# Patient Record
Sex: FEMALE | Race: WHITE | NOT HISPANIC OR LATINO | Employment: PART TIME | URBAN - METROPOLITAN AREA
[De-identification: names, ages, dates, MRNs, and addresses within clinical notes are randomized per-mention and may not be internally consistent; named-entity substitution may affect disease eponyms.]

---

## 2018-08-28 ENCOUNTER — TRANSCRIBE ORDERS (OUTPATIENT)
Dept: ADMINISTRATIVE | Facility: HOSPITAL | Age: 68
End: 2018-08-28

## 2018-08-28 ENCOUNTER — HOSPITAL ENCOUNTER (OUTPATIENT)
Dept: CARDIOLOGY | Facility: HOSPITAL | Age: 68
Discharge: HOME | End: 2018-08-28
Attending: SPECIALIST | Admitting: SPECIALIST

## 2018-08-28 ENCOUNTER — LAB (OUTPATIENT)
Dept: LAB | Facility: HOSPITAL | Age: 68
End: 2018-08-28
Attending: SPECIALIST

## 2018-08-28 DIAGNOSIS — I10 ESSENTIAL HYPERTENSION, MALIGNANT: Primary | ICD-10-CM

## 2018-08-28 DIAGNOSIS — I10 ESSENTIAL HYPERTENSION, MALIGNANT: ICD-10-CM

## 2018-08-28 LAB
ANION GAP SERPL CALCULATED.3IONS-SCNC: 10.4 MMOL/L
BUN BLD-MCNC: 21 MG/DL (ref 8–23)
BUN/CREAT SERPL: 23.3 (ref 7–25)
CALCIUM SPEC-SCNC: 8.8 MG/DL (ref 8.6–10.5)
CHLORIDE SERPL-SCNC: 104 MMOL/L (ref 98–107)
CO2 SERPL-SCNC: 25.6 MMOL/L (ref 22–29)
CREAT BLD-MCNC: 0.9 MG/DL (ref 0.57–1)
DEPRECATED RDW RBC AUTO: 48 FL (ref 37–54)
ERYTHROCYTE [DISTWIDTH] IN BLOOD BY AUTOMATED COUNT: 13.8 % (ref 11.7–13)
GFR SERPL CREATININE-BSD FRML MDRD: 62 ML/MIN/1.73
GLUCOSE BLD-MCNC: 90 MG/DL (ref 65–99)
HCT VFR BLD AUTO: 46.2 % (ref 35.6–45.5)
HGB BLD-MCNC: 14.7 G/DL (ref 11.9–15.5)
MCH RBC QN AUTO: 30.2 PG (ref 26.9–32)
MCHC RBC AUTO-ENTMCNC: 31.8 G/DL (ref 32.4–36.3)
MCV RBC AUTO: 94.9 FL (ref 80.5–98.2)
PLATELET # BLD AUTO: 225 10*3/MM3 (ref 140–500)
PMV BLD AUTO: 9.7 FL (ref 6–12)
POTASSIUM BLD-SCNC: 4.2 MMOL/L (ref 3.5–5.2)
RBC # BLD AUTO: 4.87 10*6/MM3 (ref 3.9–5.2)
SODIUM BLD-SCNC: 140 MMOL/L (ref 136–145)
WBC NRBC COR # BLD: 7.52 10*3/MM3 (ref 4.5–10.7)

## 2018-08-28 PROCEDURE — 85027 COMPLETE CBC AUTOMATED: CPT

## 2018-08-28 PROCEDURE — 93010 ELECTROCARDIOGRAM REPORT: CPT | Performed by: INTERNAL MEDICINE

## 2018-08-28 PROCEDURE — 36415 COLL VENOUS BLD VENIPUNCTURE: CPT

## 2018-08-28 PROCEDURE — 93005 ELECTROCARDIOGRAM TRACING: CPT | Performed by: SPECIALIST

## 2018-08-28 PROCEDURE — 80048 BASIC METABOLIC PNL TOTAL CA: CPT

## 2019-02-11 ENCOUNTER — OFFICE VISIT (OUTPATIENT)
Dept: INTERNAL MEDICINE | Facility: CLINIC | Age: 69
End: 2019-02-11

## 2019-02-11 VITALS
WEIGHT: 189 LBS | OXYGEN SATURATION: 96 % | HEART RATE: 74 BPM | HEIGHT: 65 IN | SYSTOLIC BLOOD PRESSURE: 140 MMHG | DIASTOLIC BLOOD PRESSURE: 82 MMHG | BODY MASS INDEX: 31.49 KG/M2

## 2019-02-11 DIAGNOSIS — R05.9 COUGH: ICD-10-CM

## 2019-02-11 DIAGNOSIS — M19.90 OSTEOARTHRITIS, UNSPECIFIED OSTEOARTHRITIS TYPE, UNSPECIFIED SITE: ICD-10-CM

## 2019-02-11 DIAGNOSIS — J20.9 ACUTE BRONCHITIS, UNSPECIFIED ORGANISM: ICD-10-CM

## 2019-02-11 DIAGNOSIS — Z00.00 WELL ADULT EXAM: ICD-10-CM

## 2019-02-11 DIAGNOSIS — Z12.31 ENCOUNTER FOR SCREENING MAMMOGRAM FOR BREAST CANCER: ICD-10-CM

## 2019-02-11 DIAGNOSIS — R03.0 ELEVATED BLOOD-PRESSURE READING WITHOUT DIAGNOSIS OF HYPERTENSION: ICD-10-CM

## 2019-02-11 DIAGNOSIS — Z00.00 LABORATORY TESTS ORDERED AS PART OF A COMPLETE PHYSICAL EXAM (CPE): ICD-10-CM

## 2019-02-11 DIAGNOSIS — Z12.11 COLON CANCER SCREENING: ICD-10-CM

## 2019-02-11 DIAGNOSIS — Z00.00 MEDICARE ANNUAL WELLNESS VISIT, INITIAL: Primary | ICD-10-CM

## 2019-02-11 DIAGNOSIS — R94.6 ABNORMAL THYROID FUNCTION TEST: ICD-10-CM

## 2019-02-11 PROBLEM — M16.0 PRIMARY OSTEOARTHRITIS OF BOTH HIPS: Status: ACTIVE | Noted: 2019-02-11

## 2019-02-11 PROCEDURE — 99397 PER PM REEVAL EST PAT 65+ YR: CPT | Performed by: INTERNAL MEDICINE

## 2019-02-11 PROCEDURE — G0439 PPPS, SUBSEQ VISIT: HCPCS | Performed by: INTERNAL MEDICINE

## 2019-02-11 PROCEDURE — G0009 ADMIN PNEUMOCOCCAL VACCINE: HCPCS | Performed by: INTERNAL MEDICINE

## 2019-02-11 PROCEDURE — 90670 PCV13 VACCINE IM: CPT | Performed by: INTERNAL MEDICINE

## 2019-02-11 PROCEDURE — 96160 PT-FOCUSED HLTH RISK ASSMT: CPT | Performed by: INTERNAL MEDICINE

## 2019-02-11 RX ORDER — AZITHROMYCIN 250 MG/1
TABLET, FILM COATED ORAL
Qty: 6 TABLET | Refills: 0 | Status: ON HOLD | OUTPATIENT
Start: 2019-02-11 | End: 2019-05-24

## 2019-02-11 NOTE — PROGRESS NOTES
Subjective     Gina Gonzalez is a 68 y.o. female who presents for an annual wellness visit/cpe.      History of Present Illness     C/o three weeks of head and nasal congestion.  Cough which is productive.  No fever.  No sinus pain.  Mild sore throat.      H/o abnormal TSH in the past.     H/o OA.      She checks BP at home.  It is always excellent.     Review of Systems   Constitutional: Negative.    HENT: Positive for congestion.    Eyes: Negative.    Respiratory: Positive for cough.    Cardiovascular: Negative.    Gastrointestinal: Negative.    Endocrine: Negative.    Genitourinary: Negative.    Musculoskeletal: Positive for arthralgias.   Skin: Negative.    Allergic/Immunologic: Negative.    Neurological: Negative.    Hematological: Negative.    Psychiatric/Behavioral: Negative.        The following portions of the patient's history were reviewed and updated as appropriate: allergies, current medications, past family history, past medical history, past social history, past surgical history and problem list.  Health maintenance tab was reviewed and updated with the patient.       Patient Active Problem List    Diagnosis Date Noted   • Primary osteoarthritis of both hips 02/11/2019       No past medical history on file.    No past surgical history on file.    Family History   Problem Relation Age of Onset   • Aneurysm Mother    • Lung cancer Father    • Coronary artery disease Brother        Social History     Socioeconomic History   • Marital status:      Spouse name: Not on file   • Number of children: Not on file   • Years of education: Not on file   • Highest education level: Not on file   Social Needs   • Financial resource strain: Not on file   • Food insecurity - worry: Not on file   • Food insecurity - inability: Not on file   • Transportation needs - medical: Not on file   • Transportation needs - non-medical: Not on file   Occupational History   • Not on file   Tobacco Use   • Smoking status: Not on  "file   Substance and Sexual Activity   • Alcohol use: Not on file   • Drug use: Not on file   • Sexual activity: Not on file   Other Topics Concern   • Not on file   Social History Narrative   • Not on file       No current outpatient medications on file prior to visit.     No current facility-administered medications on file prior to visit.        No Known Allergies    Immunization History   Administered Date(s) Administered   • Pneumococcal Conjugate 13-Valent (PCV13) 02/11/2019       Objective     /82   Pulse 74   Ht 165.1 cm (65\")   Wt 85.7 kg (189 lb)   SpO2 96%   BMI 31.45 kg/m²     Physical Exam   Constitutional: She is oriented to person, place, and time. She appears well-developed and well-nourished.   HENT:   Head: Normocephalic and atraumatic.   Right Ear: Hearing, tympanic membrane and external ear normal.   Left Ear: Hearing, tympanic membrane and external ear normal.   Nose: Nose normal.   Mouth/Throat: Oropharynx is clear and moist.   Neck: Neck supple. No thyromegaly present.   Cardiovascular: Normal rate, regular rhythm and normal heart sounds.   No murmur heard.  Pulmonary/Chest: Effort normal and breath sounds normal. Right breast exhibits no mass. Left breast exhibits no mass.   Abdominal: Soft. She exhibits no distension. There is no hepatosplenomegaly. There is no tenderness.   Genitourinary: No breast tenderness.   Lymphadenopathy:     She has no cervical adenopathy.   Neurological: She is alert and oriented to person, place, and time.   Skin: Skin is warm and dry.   Psychiatric: She has a normal mood and affect. Her speech is normal and behavior is normal. Judgment and thought content normal. Cognition and memory are normal.       Assessment/Plan   Gina was seen today for medicare wellness-subsequent.    Diagnoses and all orders for this visit:    Medicare annual wellness visit, initial    Well adult exam    Colon cancer screening  -     Ambulatory Referral For Screening " Colonoscopy    Encounter for screening mammogram for breast cancer  -     Mammo Screening Digital Tomosynthesis Bilateral With CAD; Future    Cough  -     Comprehensive Metabolic Panel  -     CBC & Differential  -     Lipid Panel  -     TSH  -     Urinalysis With Microscopic - Urine, Clean Catch    Acute bronchitis, unspecified organism  -     Comprehensive Metabolic Panel  -     CBC & Differential  -     Lipid Panel  -     TSH  -     Urinalysis With Microscopic - Urine, Clean Catch    Elevated blood-pressure reading without diagnosis of hypertension  -     Comprehensive Metabolic Panel  -     CBC & Differential  -     Lipid Panel  -     TSH  -     Urinalysis With Microscopic - Urine, Clean Catch    Laboratory tests ordered as part of a complete physical exam (CPE)  -     Comprehensive Metabolic Panel  -     CBC & Differential  -     Lipid Panel  -     TSH  -     Urinalysis With Microscopic - Urine, Clean Catch    Osteoarthritis, unspecified osteoarthritis type, unspecified site  -     Comprehensive Metabolic Panel  -     CBC & Differential  -     Lipid Panel  -     TSH  -     Urinalysis With Microscopic - Urine, Clean Catch    Abnormal thyroid function test  -     Comprehensive Metabolic Panel  -     CBC & Differential  -     Lipid Panel  -     TSH  -     Urinalysis With Microscopic - Urine, Clean Catch    Other orders  -     azithromycin (ZITHROMAX Z-KATHRYN) 250 MG tablet; Take 2 tablets the first day, then 1 tablet daily for 4 days.  -     Pneumococcal Conjugate Vaccine 13-Valent All        Discussion    AWV/CPE.  See scanned forms and/or computer for julia history, PHQ-9, functional ability questionnaire, cognitive impairment screening.  Direct observation of cognitive abilities:  The patient does not exhibit any impairment in cognitive abilities upon direct observation at today's visit.   These were all reviewed with the patient and the patient was provided with a personal prevention plan of service in patient  instructions.  Patient was given advice or information on the following topics:  nutrition, exercise  Patient presents with episodes of acute bronchitis.  A prescription for antibiotics is provided today.  The patient is instructed to take along with Mucinex DM.  Let me know they are not feeling better over the next 3 days or if there is any change in symptoms.    I have recommended that the patient get the following immunizations:  Shingrix, tdap, flu and hepatitis A.        Health Maintenance   Topic Date Due   • TDAP/TD VACCINES (1 - Tdap) 02/19/1970   • ZOSTER VACCINE (1 of 2) 02/19/2001   • MEDICARE ANNUAL WELLNESS  02/11/2019   • MAMMOGRAM  02/11/2019   • COLONOSCOPY  02/11/2019   • PNEUMOCOCCAL VACCINES (65+ LOW/MEDIUM RISK) (2 of 2 - PPSV23) 02/11/2020   • HEPATITIS C SCREENING  Addressed   • INFLUENZA VACCINE  Addressed            Future Appointments   Date Time Provider Department Center   2/20/2019 10:00 AM [facility] None   2/14/2020 10:00 AM [facility] None   2/19/2020 11:15 AM Lisa Malone MD [facility] None

## 2019-02-12 PROBLEM — R79.89 ABNORMAL TSH: Status: ACTIVE | Noted: 2019-02-12

## 2019-02-12 NOTE — PATIENT INSTRUCTIONS
Medicare Wellness  Personal Prevention Plan of Service     Date of Office Visit:  2019  Encounter Provider:  Lisa Malone MD  Place of Service:  [facility]  Patient Name: Gina HIGUERA:  1950    As part of the Medicare Wellness portion of your visit today, we are providing you with this personalized preventive plan of services (PPPS). This plan is based upon recommendations of the [place] Preventive Services Task Force (USPSTF) and the Advisory Committee on Immunization Practices (ACIP).    This lists the preventive care services that should be considered, and provides dates of when you are due. Items listed as completed are up-to-date and do not require any further intervention.    Health Maintenance   Topic Date Due   • TDAP/TD VACCINES (1 - Tdap) 1970   • ZOSTER VACCINE (1 of 2) 2001   • MEDICARE ANNUAL WELLNESS  2019   • MAMMOGRAM  2019   • COLONOSCOPY  2019   • PNEUMOCOCCAL VACCINES (65+ LOW/MEDIUM RISK) (2 of 2 - PPSV23) 2020   • HEPATITIS C SCREENING  Addressed   • INFLUENZA VACCINE  Addressed       Orders Placed This Encounter   Procedures   • Mammo Screening Digital Tomosynthesis Bilateral With CAD     Standing Status:   Future     Standing Expiration Date:   2020     Order Specific Question:   Reason for Exam:     Answer:   screen   • Pneumococcal Conjugate Vaccine 13-Valent All   • Comprehensive Metabolic Panel   • Lipid Panel   • TSH   • Ambulatory Referral For Screening Colonoscopy     Referral Priority:   Routine     Referral Type:   Diagnostic Medical     Referral Reason:   Specialty Services Required     Referred to Provider:   Ann So MD     Requested Specialty:   Gastroenterology     Number of Visits Requested:   1   • CBC & Differential     Order Specific Question:   Manual Differential     Answer:   No   • Urinalysis With Microscopic - Urine, Clean Catch       Return in about 1 year  (around 2/11/2020) for Medicare Wellness.

## 2019-02-14 ENCOUNTER — TELEPHONE (OUTPATIENT)
Dept: INTERNAL MEDICINE | Facility: CLINIC | Age: 69
End: 2019-02-14

## 2019-02-14 NOTE — TELEPHONE ENCOUNTER
She contacted medical records and there is no evidence that she has had a Tdap. CLC    Advise patient, she should get a tdap at the pharmacy.  FARAW

## 2019-02-21 LAB
ALBUMIN SERPL-MCNC: 4.4 G/DL (ref 3.6–4.8)
ALBUMIN/GLOB SERPL: 1.8 {RATIO} (ref 1.2–2.2)
ALP SERPL-CCNC: 71 IU/L (ref 39–117)
ALT SERPL-CCNC: 17 IU/L (ref 0–32)
APPEARANCE UR: CLEAR
AST SERPL-CCNC: 19 IU/L (ref 0–40)
BACTERIA #/AREA URNS HPF: NORMAL /[HPF]
BASOPHILS # BLD AUTO: 0 X10E3/UL (ref 0–0.2)
BASOPHILS NFR BLD AUTO: 1 %
BILIRUB SERPL-MCNC: 0.5 MG/DL (ref 0–1.2)
BILIRUB UR QL STRIP: NEGATIVE
BUN SERPL-MCNC: 24 MG/DL (ref 8–27)
BUN/CREAT SERPL: 29 (ref 12–28)
CALCIUM SERPL-MCNC: 9.6 MG/DL (ref 8.7–10.3)
CHLORIDE SERPL-SCNC: 103 MMOL/L (ref 96–106)
CHOLEST SERPL-MCNC: 237 MG/DL (ref 100–199)
CO2 SERPL-SCNC: 24 MMOL/L (ref 20–29)
COLOR UR: YELLOW
CREAT SERPL-MCNC: 0.84 MG/DL (ref 0.57–1)
EOSINOPHIL # BLD AUTO: 0.2 X10E3/UL (ref 0–0.4)
EOSINOPHIL NFR BLD AUTO: 4 %
EPI CELLS #/AREA URNS HPF: NORMAL /HPF
ERYTHROCYTE [DISTWIDTH] IN BLOOD BY AUTOMATED COUNT: 13.6 % (ref 12.3–15.4)
GLOBULIN SER CALC-MCNC: 2.5 G/DL (ref 1.5–4.5)
GLUCOSE SERPL-MCNC: 86 MG/DL (ref 65–99)
GLUCOSE UR QL: NEGATIVE
HCT VFR BLD AUTO: 44 % (ref 34–46.6)
HDLC SERPL-MCNC: 88 MG/DL
HGB BLD-MCNC: 14.4 G/DL (ref 11.1–15.9)
HGB UR QL STRIP: NEGATIVE
IMM GRANULOCYTES # BLD AUTO: 0 X10E3/UL (ref 0–0.1)
IMM GRANULOCYTES NFR BLD AUTO: 0 %
KETONES UR QL STRIP: NEGATIVE
LDLC SERPL CALC-MCNC: 137 MG/DL (ref 0–99)
LEUKOCYTE ESTERASE UR QL STRIP: (no result)
LYMPHOCYTES # BLD AUTO: 2 X10E3/UL (ref 0.7–3.1)
LYMPHOCYTES NFR BLD AUTO: 38 %
MCH RBC QN AUTO: 30.3 PG (ref 26.6–33)
MCHC RBC AUTO-ENTMCNC: 32.7 G/DL (ref 31.5–35.7)
MCV RBC AUTO: 93 FL (ref 79–97)
MICRO URNS: (no result)
MONOCYTES # BLD AUTO: 0.5 X10E3/UL (ref 0.1–0.9)
MONOCYTES NFR BLD AUTO: 10 %
MUCOUS THREADS URNS QL MICRO: PRESENT
NEUTROPHILS # BLD AUTO: 2.5 X10E3/UL (ref 1.4–7)
NEUTROPHILS NFR BLD AUTO: 47 %
NITRITE UR QL STRIP: NEGATIVE
PH UR STRIP: 7 [PH] (ref 5–7.5)
PLATELET # BLD AUTO: 296 X10E3/UL (ref 150–379)
POTASSIUM SERPL-SCNC: 4.6 MMOL/L (ref 3.5–5.2)
PROT SERPL-MCNC: 6.9 G/DL (ref 6–8.5)
PROT UR QL STRIP: NEGATIVE
RBC # BLD AUTO: 4.75 X10E6/UL (ref 3.77–5.28)
RBC #/AREA URNS HPF: NORMAL /HPF
SODIUM SERPL-SCNC: 141 MMOL/L (ref 134–144)
SP GR UR: 1.02 (ref 1–1.03)
TRIGL SERPL-MCNC: 59 MG/DL (ref 0–149)
TSH SERPL DL<=0.005 MIU/L-ACNC: 3.54 UIU/ML (ref 0.45–4.5)
UROBILINOGEN UR STRIP-MCNC: 0.2 MG/DL (ref 0.2–1)
VLDLC SERPL CALC-MCNC: 12 MG/DL (ref 5–40)
WBC # BLD AUTO: 5.3 X10E3/UL (ref 3.4–10.8)
WBC #/AREA URNS HPF: NORMAL /HPF

## 2019-02-22 PROBLEM — E78.49 OTHER HYPERLIPIDEMIA: Status: ACTIVE | Noted: 2019-02-22

## 2019-03-14 ENCOUNTER — HOSPITAL ENCOUNTER (OUTPATIENT)
Dept: MAMMOGRAPHY | Facility: HOSPITAL | Age: 69
Discharge: HOME | End: 2019-03-14
Admitting: INTERNAL MEDICINE

## 2019-03-14 DIAGNOSIS — Z12.31 ENCOUNTER FOR SCREENING MAMMOGRAM FOR BREAST CANCER: ICD-10-CM

## 2019-03-14 PROCEDURE — 77067 SCR MAMMO BI INCL CAD: CPT

## 2019-03-14 PROCEDURE — 77063 BREAST TOMOSYNTHESIS BI: CPT

## 2019-03-15 DIAGNOSIS — Z12.11 COLON CANCER SCREENING: Primary | ICD-10-CM

## 2019-03-15 RX ORDER — SODIUM CHLORIDE, SODIUM LACTATE, POTASSIUM CHLORIDE, CALCIUM CHLORIDE 600; 310; 30; 20 MG/100ML; MG/100ML; MG/100ML; MG/100ML
30 INJECTION, SOLUTION INTRAVENOUS CONTINUOUS
Status: CANCELLED | OUTPATIENT
Start: 2019-05-24

## 2019-03-20 PROBLEM — Z12.11 COLON CANCER SCREENING: Status: ACTIVE | Noted: 2019-03-20

## 2019-05-24 ENCOUNTER — ANESTHESIA (OUTPATIENT)
Dept: GASTROENTEROLOGY | Facility: HOSPITAL | Age: 69
End: 2019-05-24

## 2019-05-24 ENCOUNTER — HOSPITAL ENCOUNTER (OUTPATIENT)
Facility: HOSPITAL | Age: 69
Setting detail: HOSPITAL OUTPATIENT SURGERY
Discharge: HOME | End: 2019-05-24
Attending: INTERNAL MEDICINE | Admitting: INTERNAL MEDICINE

## 2019-05-24 ENCOUNTER — TELEPHONE (OUTPATIENT)
Dept: GASTROENTEROLOGY | Facility: CLINIC | Age: 69
End: 2019-05-24

## 2019-05-24 ENCOUNTER — ANESTHESIA EVENT (OUTPATIENT)
Dept: GASTROENTEROLOGY | Facility: HOSPITAL | Age: 69
End: 2019-05-24

## 2019-05-24 VITALS
OXYGEN SATURATION: 96 % | HEART RATE: 58 BPM | SYSTOLIC BLOOD PRESSURE: 133 MMHG | BODY MASS INDEX: 30.37 KG/M2 | WEIGHT: 189 LBS | RESPIRATION RATE: 16 BRPM | TEMPERATURE: 97.6 F | DIASTOLIC BLOOD PRESSURE: 93 MMHG | HEIGHT: 66 IN

## 2019-05-24 DIAGNOSIS — Z12.11 COLON CANCER SCREENING: ICD-10-CM

## 2019-05-24 PROCEDURE — S0260 H&P FOR SURGERY: HCPCS | Performed by: INTERNAL MEDICINE

## 2019-05-24 PROCEDURE — G0121 COLON CA SCRN NOT HI RSK IND: HCPCS | Performed by: INTERNAL MEDICINE

## 2019-05-24 PROCEDURE — 25010000002 PROPOFOL 10 MG/ML EMULSION: Performed by: NURSE ANESTHETIST, CERTIFIED REGISTERED

## 2019-05-24 RX ORDER — PROPOFOL 10 MG/ML
VIAL (ML) INTRAVENOUS AS NEEDED
Status: DISCONTINUED | OUTPATIENT
Start: 2019-05-24 | End: 2019-05-24 | Stop reason: SURG

## 2019-05-24 RX ORDER — PROMETHAZINE HYDROCHLORIDE 25 MG/1
25 TABLET ORAL ONCE AS NEEDED
Status: DISCONTINUED | OUTPATIENT
Start: 2019-05-24 | End: 2019-05-24 | Stop reason: HOSPADM

## 2019-05-24 RX ORDER — SODIUM CHLORIDE, SODIUM LACTATE, POTASSIUM CHLORIDE, CALCIUM CHLORIDE 600; 310; 30; 20 MG/100ML; MG/100ML; MG/100ML; MG/100ML
30 INJECTION, SOLUTION INTRAVENOUS CONTINUOUS
Status: DISCONTINUED | OUTPATIENT
Start: 2019-05-24 | End: 2019-05-24 | Stop reason: HOSPADM

## 2019-05-24 RX ORDER — PROMETHAZINE HYDROCHLORIDE 25 MG/ML
12.5 INJECTION, SOLUTION INTRAMUSCULAR; INTRAVENOUS ONCE AS NEEDED
Status: DISCONTINUED | OUTPATIENT
Start: 2019-05-24 | End: 2019-05-24 | Stop reason: HOSPADM

## 2019-05-24 RX ORDER — PROPOFOL 10 MG/ML
VIAL (ML) INTRAVENOUS CONTINUOUS PRN
Status: DISCONTINUED | OUTPATIENT
Start: 2019-05-24 | End: 2019-05-24 | Stop reason: SURG

## 2019-05-24 RX ORDER — LIDOCAINE HYDROCHLORIDE 20 MG/ML
INJECTION, SOLUTION INFILTRATION; PERINEURAL AS NEEDED
Status: DISCONTINUED | OUTPATIENT
Start: 2019-05-24 | End: 2019-05-24 | Stop reason: SURG

## 2019-05-24 RX ORDER — PROMETHAZINE HYDROCHLORIDE 25 MG/1
25 SUPPOSITORY RECTAL ONCE AS NEEDED
Status: DISCONTINUED | OUTPATIENT
Start: 2019-05-24 | End: 2019-05-24 | Stop reason: HOSPADM

## 2019-05-24 RX ADMIN — LIDOCAINE HYDROCHLORIDE 60 MG: 20 INJECTION, SOLUTION INFILTRATION; PERINEURAL at 08:05

## 2019-05-24 RX ADMIN — PROPOFOL 50 MG: 10 INJECTION, EMULSION INTRAVENOUS at 08:05

## 2019-05-24 RX ADMIN — PROPOFOL 200 MCG/KG/MIN: 10 INJECTION, EMULSION INTRAVENOUS at 08:05

## 2019-05-24 RX ADMIN — SODIUM CHLORIDE, POTASSIUM CHLORIDE, SODIUM LACTATE AND CALCIUM CHLORIDE 30 ML/HR: 600; 310; 30; 20 INJECTION, SOLUTION INTRAVENOUS at 07:37

## 2019-05-24 NOTE — ANESTHESIA PREPROCEDURE EVALUATION
Anesthesia Evaluation     Patient summary reviewed   NPO Solid Status: > 8 hours  NPO Liquid Status: > 8 hours           Airway   Mallampati: II  TM distance: >3 FB  Neck ROM: full  No difficulty expected  Dental - normal exam     Pulmonary     breath sounds clear to auscultation  Cardiovascular   Exercise tolerance: good (4-7 METS)    Rhythm: regular  Rate: normal        Neuro/Psych  GI/Hepatic/Renal/Endo      Musculoskeletal     Abdominal    Substance History      OB/GYN          Other                        Anesthesia Plan    ASA 1     MAC     intravenous induction   Anesthetic plan, all risks, benefits, and alternatives have been provided, discussed and informed consent has been obtained with: patient.

## 2019-05-24 NOTE — ANESTHESIA POSTPROCEDURE EVALUATION
Patient: Gina Gonzalez    Procedure Summary     Date:  05/24/19 Room / Location:   [facility]    Anesthesia Start:  0804 Anesthesia Stop:  0840    Procedure:  COLONOSCOPY TO CECUM AND TI (N/A ) Diagnosis:       Colon cancer screening      (Colon cancer screening [Z12.11])    Surgeon:  Cecilia Aviles MD Provider:  Robbie Eugene MD    Anesthesia Type:  MAC ASA Status:  1          Anesthesia Type: MAC  Last vitals  BP   131/88 (05/24/19 0851)   Temp   36.4 °C (97.6 °F) (05/24/19 0840)   Pulse   67 (05/24/19 0851)   Resp   16 (05/24/19 0851)     SpO2   96 % (05/24/19 0851)     Post Anesthesia Care and Evaluation    Patient location during evaluation: bedside  Patient participation: complete - patient participated  Level of consciousness: awake and alert  Pain score: 0  Pain management: adequate  Airway patency: patent  Anesthetic complications: No anesthetic complications  PONV Status: none  Cardiovascular status: acceptable  Respiratory status: acceptable  Hydration status: acceptable  Post Neuraxial Block status: Motor and sensory function returned to baseline

## 2019-05-24 NOTE — TELEPHONE ENCOUNTER
----- Message from Cecilia Aviles MD sent at 5/24/2019  8:40 AM EDT -----  pls place in 10 year recall, x

## 2019-07-24 ENCOUNTER — TRANSCRIBE ORDERS (OUTPATIENT)
Dept: PHYSICAL THERAPY | Facility: HOSPITAL | Age: 69
End: 2019-07-24

## 2019-07-24 DIAGNOSIS — S46.212A BICEPS TENDON RUPTURE, PROXIMAL, LEFT, INITIAL ENCOUNTER: Primary | ICD-10-CM

## 2019-07-30 ENCOUNTER — APPOINTMENT (OUTPATIENT)
Dept: PHYSICAL THERAPY | Facility: HOSPITAL | Age: 69
End: 2019-07-30

## 2019-08-02 ENCOUNTER — TREATMENT (OUTPATIENT)
Dept: PHYSICAL THERAPY | Facility: CLINIC | Age: 69
End: 2019-08-02

## 2019-08-02 DIAGNOSIS — M25.512 ACUTE PAIN OF LEFT SHOULDER: ICD-10-CM

## 2019-08-02 DIAGNOSIS — S46.212A BICEPS RUPTURE, PROXIMAL, LEFT, INITIAL ENCOUNTER: Primary | ICD-10-CM

## 2019-08-02 PROCEDURE — 97161 PT EVAL LOW COMPLEX 20 MIN: CPT | Performed by: PHYSICAL THERAPIST

## 2019-08-02 PROCEDURE — 97110 THERAPEUTIC EXERCISES: CPT | Performed by: PHYSICAL THERAPIST

## 2019-08-02 NOTE — PROGRESS NOTES
"Physical Therapy Initial Evaluation and Plan of Care    Patient: Gina Gonzalez   : 1950  Diagnosis/ICD-10 Code:  Biceps rupture, proximal, left, initial encounter [S46.212A]  Referring practitioner: Mary Almanzar MD    Subjective Evaluation    History of Present Illness  Date of onset: 2019  Mechanism of injury: Repetitive lifting and pulling     Subjective comment: Pt reports she does repetitve lifting and pulling on her job. 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  Quality of life: excellent    Pain  Current pain ratin  At best pain ratin  At worst pain ratin  Location: L anterior shoulder   Quality: dull ache  Relieving factors: rest  Aggravating factors: lifting and overhead activity  Progression: improved    Social Support  Lives in: multiple-level home  Lives with: spouse    Hand dominance: right    Treatments  Previous treatment: medication  Current treatment: medication and physical therapy  Patient Goals  Patient goals for therapy: increased strength, increased motion and decreased pain             Objective       Observations     Additional Observation Details  L bicep \"deisi\" sign     Palpation     Additional Palpation Details  TTP R anterior shoulder     Active Range of Motion   Left Shoulder   Normal active range of motion    Right Shoulder   Normal active range of motion    Scapular Mobility   Left Shoulder   Scapular mobility: good    Right Shoulder   Scapular mobility: good    Strength/Myotome Testing     Left Shoulder     Planes of Motion   Flexion: 5   Abduction: 4+   External rotation at 0°: 5   External rotation at 90°: 5     Right Shoulder   Normal muscle strength    Additional Strength Details  L biceps 4+/5  L brachialis 5/5  L brachioradialis 5/5    Functional Assessment     Comments  Quick dash score 18.18  Work module: moderate difficulty          Assessment & Plan "     Assessment  Impairments: abnormal muscle firing, abnormal or restricted ROM, activity intolerance, impaired physical strength, lacks appropriate home exercise program and pain with function  Assessment details: Pt presents to PT with symptoms consistent with L proximal bicep tendon rupture.  Pt would benefit from skilled PT intervention to address the deficits noted.   Prognosis: good  Functional Limitations: carrying objects, lifting, sleeping, pushing, uncomfortable because of pain, reaching behind back and reaching overhead  Goals  Plan Goals: SHORT TERM GOALS: 2 visits  1. Patient to be compliant with HEP     LONG TERM GOALS: 4 visits  1. Pt will have improved score on quick dash   2. Pt to exhibit 5/5 UE strength to allow for pushing/pulling and lifting >5 #to occur with pain <2/10 to tolerate work and household activities       Plan  Therapy options: will be seen for skilled physical therapy services  Planned modality interventions: cryotherapy, electrical stimulation/Russian stimulation, TENS, thermotherapy (hydrocollator packs) and ultrasound  Other planned modality interventions: Dry Needling  Planned therapy interventions: flexibility, body mechanics training, home exercise program, functional ROM exercises, joint mobilization, manual therapy, postural training, soft tissue mobilization, spinal/joint mobilization, strengthening, stretching and therapeutic activities  Duration in visits: 4  Treatment plan discussed with: patient        Manual Therapy:          mins  90150;  Therapeutic Exercise:       10   mins  81754;     Neuromuscular Jeremi:         mins  31124;    Therapeutic Activity:           mins  59891;     Gait Training:            mins  88017;     Ultrasound:           mins  89639;    Electrical Stimulation:          mins  23193 ( );  Dry Needling           mins self-pay  Traction           mins 55109  Canalith Repositioning         mins 37638      Timed Treatment:   10   mins   Total  Treatment:   55     mins    PT SIGNATURE: Shell Jose Miguel, YRIS   [place] Lic #[id number]    DATE TREATMENT INITIATED: 8/2/2019    Initial Certification  Certification Period: 10/31/2019  I certify that the therapy services are furnished while this patient is under my care.  The services outlined above are required by this patient, and will be reviewed every 90 days.     PHYSICIAN: Mary Almanzar MD      DATE:     Please sign and return via fax to [phone].. Thank you, [facility].

## 2019-08-02 NOTE — PATIENT INSTRUCTIONS
HEP:    Access Code: [email or web address]   URL: [email or web address]   Date: 08/02/2019   Prepared by: Shell Gillespie     Exercises   Standing Shoulder Flexion with Resistance - 10 reps - 3 sets - 1x daily - 7x weekly   Standing Single Arm Shoulder Abduction with Resistance - 10 reps - 3 sets - 1x daily - 7x weekly   Standing Row with Resistance - 10 reps - 3 sets - 1x daily - 7x weekly   Standing Single Arm Elbow Flexion with Resistance - 10 reps - 3 sets - 1x daily - 7x weekly   Standing Bilateral Elbow Extension with Anchored Resistance - 10 reps - 3 sets - 1x daily - 7x weekly

## 2019-08-07 ENCOUNTER — TREATMENT (OUTPATIENT)
Dept: PHYSICAL THERAPY | Facility: CLINIC | Age: 69
End: 2019-08-07

## 2019-08-07 DIAGNOSIS — M25.512 ACUTE PAIN OF LEFT SHOULDER: ICD-10-CM

## 2019-08-07 DIAGNOSIS — S46.212A BICEPS RUPTURE, PROXIMAL, LEFT, INITIAL ENCOUNTER: Primary | ICD-10-CM

## 2019-08-07 PROCEDURE — 97110 THERAPEUTIC EXERCISES: CPT | Performed by: PHYSICAL THERAPIST

## 2019-08-07 NOTE — PROGRESS NOTES
Physical Therapy Daily Progress Note  Visit: 2    Gina Gonzalez reports: her L arm is feeling better and reports compliance with HEP. States she is tolerating job duties well.     Subjective     Objective   See Exercise, Manual, and Modality Logs for complete treatment.       Assessment/Plan: progressing as evidenced by reports of decreased pain and increase tolerance to daily activities     Manual Therapy:         mins  29720;  Therapeutic Exercise:     54     mins  37826;     Neuromuscular Jeremi:         mins  00042;    Therapeutic Activity:           mins  12427;     Gait Training:            mins  93715;     Ultrasound:           mins  89391;    Electrical Stimulation:          mins  22100 ( );  Dry Needling           mins self-pay  Traction           mins 91028  Canalith Repositioning         mins 55736      Timed Treatment:54      mins   Total Treatment:    60     mins    Shell Gillespie, PT  [place] License #: [id number]    Physical Therapist

## 2019-08-08 ENCOUNTER — APPOINTMENT (OUTPATIENT)
Dept: PHYSICAL THERAPY | Facility: HOSPITAL | Age: 69
End: 2019-08-08

## 2019-08-09 ENCOUNTER — TREATMENT (OUTPATIENT)
Dept: PHYSICAL THERAPY | Facility: CLINIC | Age: 69
End: 2019-08-09

## 2019-08-09 DIAGNOSIS — M25.512 ACUTE PAIN OF LEFT SHOULDER: ICD-10-CM

## 2019-08-09 DIAGNOSIS — S46.212A BICEPS RUPTURE, PROXIMAL, LEFT, INITIAL ENCOUNTER: Primary | ICD-10-CM

## 2019-08-09 PROCEDURE — 97110 THERAPEUTIC EXERCISES: CPT | Performed by: PHYSICAL THERAPIST

## 2019-08-09 NOTE — PROGRESS NOTES
Physical Therapy Daily Progress Note  Visit: 3    Gina Gonzalez reports: she followed up with her Dr yesterday and has new order to continue x 4 wks. Pt has lifting restriction of 5lbs for her job duties.    Subjective     Objective   See Exercise, Manual, and Modality Logs for complete treatment.       Assessment/Plan: Pt tolerates there ex well./continue to progress strength and activity tolerance as tolerated     Manual Therapy:          mins  76951;  Therapeutic Exercise:    40      mins  35818;     Neuromuscular Jeremi:         mins  36271;    Therapeutic Activity:           mins  46483;     Gait Training:            mins  12567;     Ultrasound:           mins  44985;    Electrical Stimulation:          mins  04216 ( );  Dry Needling           mins self-pay  Traction           mins 08552  Canalith Repositioning         mins 29308      Timed Treatment:   40   mins   Total Treatment:     50   mins    Shell Gillespie PT  [place] License #: [id number]    Physical Therapist

## 2019-08-14 ENCOUNTER — TREATMENT (OUTPATIENT)
Dept: PHYSICAL THERAPY | Facility: CLINIC | Age: 69
End: 2019-08-14

## 2019-08-14 DIAGNOSIS — M25.512 ACUTE PAIN OF LEFT SHOULDER: ICD-10-CM

## 2019-08-14 DIAGNOSIS — S46.212A BICEPS RUPTURE, PROXIMAL, LEFT, INITIAL ENCOUNTER: Primary | ICD-10-CM

## 2019-08-14 PROCEDURE — 97110 THERAPEUTIC EXERCISES: CPT | Performed by: PHYSICAL THERAPIST

## 2019-08-14 NOTE — PROGRESS NOTES
Physical Therapy Daily Progress Note  Visit: 4    Gina Gonzalez reports: no pain in her L arm. Reports she gets a little pain when she has to lift wet towels  at work.     Subjective     Objective   See Exercise, Manual, and Modality Logs for complete treatment.       Assessment/Plan: Progressing well as evidenced by reports of no pain and ability to tolerate progression of exercises /. Continue to progress as tolerated    Manual Therapy:          mins  24747;  Therapeutic Exercise:     55     mins  24460;     Neuromuscular Jeremi:         mins  78356;    Therapeutic Activity:           mins  03519;     Gait Training:            mins  13826;     Ultrasound:           mins  89139;    Electrical Stimulation:          mins  07678 ( );  Dry Needling           mins self-pay  Traction           mins 17738  Canalith Repositioning         mins 70001      Timed Treatment:   55   mins   Total Treatment:   70     mins    Shell Gillespie PT  [place] License #: [id number]    Physical Therapist

## 2019-08-21 ENCOUNTER — TREATMENT (OUTPATIENT)
Dept: PHYSICAL THERAPY | Facility: CLINIC | Age: 69
End: 2019-08-21

## 2019-08-21 DIAGNOSIS — S46.212A BICEPS RUPTURE, PROXIMAL, LEFT, INITIAL ENCOUNTER: Primary | ICD-10-CM

## 2019-08-21 DIAGNOSIS — M25.512 ACUTE PAIN OF LEFT SHOULDER: ICD-10-CM

## 2019-08-21 PROCEDURE — 97110 THERAPEUTIC EXERCISES: CPT | Performed by: PHYSICAL THERAPIST

## 2019-08-21 NOTE — PROGRESS NOTES
Physical Therapy Daily Progress Note/ Discharge note  Initial Visit: 8/2/19   Visit: 5    Gina Gonzalez reports: no pain, compliance with HEP and no difficulty with work or household chores and good functional mobility with L UE. Pt reports that she fell on Saturday and landed on her R shoulder. Reports her pain is better but she is unable to reach behind her back. Advised pt to follow up with her Dr regarding her R shoulder.     Subjective     Objective   See Exercise, Manual, and Modality Logs for complete treatment.   Goals  Plan Goals: SHORT TERM GOALS: 2 visits  1. Patient to be compliant with HEP  MET    LONG TERM GOALS: 4 visits  1. Pt will have improved score on quick dash MET  2. Pt to exhibit 5/5 UE strength to allow for pushing/pulling and lifting >5 #to occur with pain <2/10 to tolerate work and household activities MET      Assessment/Plan: Pt has progressed well as evidenced by no c/o pain and good functional mobility with L UE. She had met goals. / Discharge PT and continue HEP     Manual Therapy:          mins  03915;  Therapeutic Exercise:    40      mins  44054;     Neuromuscular Jeremi:         mins  20145;    Therapeutic Activity:           mins  27757;     Gait Training:            mins  44420;     Ultrasound:           mins  45357;    Electrical Stimulation:          mins  32575 ( );  Dry Needling           mins self-pay  Traction           mins 27528  Canalith Repositioning         mins 09736      Timed Treatment:  40    mins   Total Treatment:    40    mins    Shell Gillespie PT  [place] License #: [id number]    Physical Therapist

## 2019-10-09 ENCOUNTER — TREATMENT (OUTPATIENT)
Dept: PHYSICAL THERAPY | Facility: CLINIC | Age: 69
End: 2019-10-09

## 2019-10-09 DIAGNOSIS — M75.121 COMPLETE TEAR OF RIGHT ROTATOR CUFF, UNSPECIFIED WHETHER TRAUMATIC: Primary | ICD-10-CM

## 2019-10-09 DIAGNOSIS — M25.611 DECREASED RIGHT SHOULDER RANGE OF MOTION: ICD-10-CM

## 2019-10-09 DIAGNOSIS — M25.511 ACUTE PAIN OF RIGHT SHOULDER: ICD-10-CM

## 2019-10-09 PROCEDURE — 97161 PT EVAL LOW COMPLEX 20 MIN: CPT | Performed by: PHYSICAL THERAPIST

## 2019-10-09 PROCEDURE — 97110 THERAPEUTIC EXERCISES: CPT | Performed by: PHYSICAL THERAPIST

## 2019-10-09 NOTE — PROGRESS NOTES
Physical Therapy Initial Evaluation and Plan of Care    Patient: Gina Gonzalez   : 1950  Diagnosis/ICD-10 Code:  Complete tear of right rotator cuff, unspecified whether traumatic [M75.121]  Referring practitioner: Mary Almanzar MD    Subjective Evaluation    History of Present Illness  Date of onset: 2019  Mechanism of injury: Pt reports she fell and landed on her R shoulder around 19. Pt reports continued pain in R shoulder and followed up with her Dr and MRI was ordered. Pt's MRI revealed full thickness tear of subscapularis and supraspinatus tendons and tear of long head of biceps.       Patient Occupation: works at [employer]  Quality of life: excellent    Pain  Current pain ratin  At best pain ratin  At worst pain ratin  Location: R shoulder   Quality: burning and dull ache  Relieving factors: change in position  Aggravating factors: lifting, movement, overhead activity, prolonged positioning, sleeping and outstretched reach  Progression: no change    Social Support  Lives in: multiple-level home  Lives with: spouse    Hand dominance: right    Diagnostic Tests  MRI studies: abnormal    Treatments  Previous treatment: injection treatment  Current treatment: physical therapy  Patient Goals  Patient goals for therapy: decreased pain, increased motion and increased strength             Objective       Palpation     Right Tenderness of the anterior deltoid, biceps, middle deltoid, subscapularis, supraspinatus and upper trapezius.     Active Range of Motion   Left Shoulder   Flexion: 165 degrees   Abduction: 160 degrees   External rotation 0°: WFL  Internal rotation BTB: L1     Right Shoulder   Flexion: 140 degrees   Abduction: 100 degrees   External rotation 0°: WFL  Internal rotation BTB: lumbar     Strength/Myotome Testing     Left Shoulder     Planes of Motion   Flexion: 5   Abduction: 5   External rotation at 0°: 5   Internal rotation at 0°: 5     Right  Shoulder     Planes of Motion   Flexion: 3-   Abduction: 3-          Assessment & Plan     Assessment  Impairments: abnormal muscle firing, abnormal or restricted ROM, impaired physical strength and pain with function  Assessment details: Pt presents to PT with symptoms consistent with R shoulder weakness, pain and decreased ROM related to RC tear .  Pt would benefit from skilled PT intervention to address the deficits noted.   Prognosis: good  Functional Limitations: lifting, sleeping, pushing, uncomfortable because of pain, reaching behind back and reaching overhead  Goals  Plan Goals: SHORT TERM GOALS: 8 visits  1. Patient to be compliant with HEP and no diff. with sleeping  2. Increased (R) UE strength to 4/5 with no pain> 4/10 to allow for household and work activities.   3. Pt to exhibit (R) shoulder active flexion to 150/ ABD to 135° in standing/sitting to assist with reaching overhead with less pain  4. Pt demonstrates improved posture in sitting and standing with minimal-no cues during treatment session    LONG TERM GOALS: 16 visits  1. Pt score <10% perceived disability on DASH   2. Pt. to exhibit (R) shoulder AROM to WFL (> 160° flex/abd. to allow for reaching overhead and behind back without pain limiting function  3. Pt to exhibit 5/5 UE strength to allow for pushing/pulling and lifting >5 #to occur with pain <2/10  4. Pt able to reach overhead and lift 5# (B)  to allow for return to doing work around home.       Plan  Therapy options: will be seen for skilled physical therapy services  Planned modality interventions: cryotherapy, electrical stimulation/Russian stimulation, TENS, thermotherapy (hydrocollator packs) and ultrasound  Other planned modality interventions: Dry Needling  Planned therapy interventions: abdominal trunk stabilization, ADL retraining, flexibility, body mechanics training, home exercise program, functional ROM exercises, joint mobilization, manual therapy, neuromuscular  re-education, postural training, soft tissue mobilization, spinal/joint mobilization, strengthening, stretching and therapeutic activities  Duration in visits: 16  Treatment plan discussed with: patient        Manual Therapy:          mins  97232;  Therapeutic Exercise:     10     mins  93957;     Neuromuscular Jeremi:         mins  65295;    Therapeutic Activity:           mins  56350;     Gait Training:            mins  03062;     Ultrasound:           mins  50737;    Electrical Stimulation:          mins  85830 ( );  Dry Needling           mins self-pay  Traction           mins 75469  Canalith Repositioning         mins [id number]      Timed Treatment: 10     mins   Total Treatment:      50  mins    PT SIGNATURE: Shell Gillespie PT   [place] Lic #[id number]    DATE TREATMENT INITIATED: 10/9/2019    Initial Certification  Certification Period: 1/7/2020  I certify that the therapy services are furnished while this patient is under my care.  The services outlined above are required by this patient, and will be reviewed every 90 days.     PHYSICIAN: Mary Almanzar MD      DATE:     Please sign and return via fax to [phone].. Thank you, [facility].

## 2019-10-09 NOTE — PATIENT INSTRUCTIONS
Pt was educated on findings of evaluation, purpose of treatment and goals for therapy. Treatment options discussed and questions answered. Pt was educated on exercises, self treatment and pain relief techniques.

## 2019-10-16 ENCOUNTER — TREATMENT (OUTPATIENT)
Dept: PHYSICAL THERAPY | Facility: CLINIC | Age: 69
End: 2019-10-16

## 2019-10-16 DIAGNOSIS — M75.121 COMPLETE TEAR OF RIGHT ROTATOR CUFF, UNSPECIFIED WHETHER TRAUMATIC: Primary | ICD-10-CM

## 2019-10-16 DIAGNOSIS — M25.611 DECREASED RIGHT SHOULDER RANGE OF MOTION: ICD-10-CM

## 2019-10-16 DIAGNOSIS — M25.511 ACUTE PAIN OF RIGHT SHOULDER: ICD-10-CM

## 2019-10-16 PROCEDURE — 97110 THERAPEUTIC EXERCISES: CPT | Performed by: PHYSICAL THERAPIST

## 2019-10-16 NOTE — PROGRESS NOTES
Physical Therapy Daily Progress Note  Visit: 2    Gina Gonzalez reports: she has appointment to see a shoulder specialist on 11/1/19. Reports having night pain only and tolerating job activities.     Subjective     Objective   See Exercise, Manual, and Modality Logs for complete treatment. Reviewed HEP with patient.       Assessment/Plan  R shoulder supraspinatus, subscapularis and long head of biceps tendon tear. will hold on PT for now until pt sees the specialist to determine plan.     Manual Therapy:          mins  69453;  Therapeutic Exercise:     10     mins  98394;     Neuromuscular Jeremi:         mins  86930;    Therapeutic Activity:           mins  50286;     Gait Training:            mins  05444;     Ultrasound:           mins  96500;    Electrical Stimulation:          mins  26395 ( );  Dry Needling           mins self-pay  Traction           mins 91216  Canalith Repositioning         mins [id number]      Timed Treatment:  10    mins   Total Treatment:    25   mins    YRIS Monahan License #: [id number]    Physical Therapist

## 2019-11-08 ENCOUNTER — OFFICE VISIT (OUTPATIENT)
Dept: ORTHOPEDIC SURGERY | Facility: CLINIC | Age: 69
End: 2019-11-08

## 2019-11-08 VITALS — TEMPERATURE: 97.4 F | HEIGHT: 66 IN | WEIGHT: 187.4 LBS | BODY MASS INDEX: 30.12 KG/M2

## 2019-11-08 DIAGNOSIS — M25.511 RIGHT SHOULDER PAIN, UNSPECIFIED CHRONICITY: Primary | ICD-10-CM

## 2019-11-08 PROCEDURE — 99203 OFFICE O/P NEW LOW 30 MIN: CPT | Performed by: ORTHOPAEDIC SURGERY

## 2019-11-08 PROCEDURE — 73030 X-RAY EXAM OF SHOULDER: CPT | Performed by: ORTHOPAEDIC SURGERY

## 2019-11-10 NOTE — PROGRESS NOTES
Patient: Gina Gonzalez    YOB: 1950    Medical Record Number: [id number]    Chief Complaints:   Right shoulder pain    History of Present Illness:     69 y.o. female patient who presents with right shoulder pain and weakness.  Her symptoms started after she tripped on a rope while building a fence about 3 months ago.  The injury happened in August.  She saw another orthopedist and had an injection.  She was also referred to physical therapy.  The injection did help transiently.  The therapy did not really help significantly.  She continues to have pain and weakness.  Her pain is described as mild, intermittent and aching.  Her biggest complaint is the weakness and dysfunction.  She struggles with reaching and lifting.  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  This is difficult for her.  She is right-hand dominant.  She reports good use and function of the right arm prior to this injury.    Allergies: No Known Allergies    Home Medications:  No current outpatient medications on file.    Past Medical History:   Diagnosis Date   • Arthritis        Past Surgical History:   Procedure Laterality Date   • ABDOMINOPLASTY     • BREAST BIOPSY     • COLONOSCOPY N/A 2019    Procedure: COLONOSCOPY TO CECUM AND TI;  Surgeon: Cecilia Aviles MD;  Location: [facility] ENDOSCOPY;  Service: Gastroenterology   • EYE SURGERY     • MENISCECTOMY Right    • TOTAL HIP ARTHROPLASTY Bilateral        Social History     Occupational History   • Not on file   Tobacco Use   • Smoking status: Former Smoker     Last attempt to quit: 1980     Years since quittin.8   • Smokeless tobacco: Never Used   Substance and Sexual Activity   • Alcohol use: Yes     Alcohol/week: 4.2 oz     Types: 7 Glasses of wine per week     Drinks per session: 1 or 2   • Drug use: No   • Sexual activity: Defer      Social History     Social History Narrative   • Not on file       Family History   Problem Relation Age of  "Onset   • Aneurysm Mother    • Lung cancer Father    • Coronary artery disease Brother        Review of Systems:      Constitutional: Denies fever, shaking or chills   Eyes: Denies change in visual acuity   HEENT: Denies nasal congestion or sore throat   Respiratory: Denies cough or shortness of breath   Cardiovascular: Denies chest pain or edema  Endocrine: Denies tremors, palpitations, intolerance of heat or cold, polyuria, polydipsia.  GI: Denies abdominal pain, nausea, vomiting, bloody stools or diarrhea  : Denies frequency, urgency, incontinence, retention, or nocturia.  Musculoskeletal: Denies numbness, tingling or loss of motor function except as above  Integument: Denies rash, lesion or ulceration   Neurologic: Denies headache or focal weakness, deficits  Heme: Denies spontaneous or excessive bleeding, epistaxis, hematuria, melena, fatigue, enlarged or tender lymph nodes.      All other pertinent positives and negatives as noted above in HPI.    Physical Exam: 69 y.o. female    Vitals:    11/08/19 0905   Temp: 97.4 °F (36.3 °C)   TempSrc: Temporal   Weight: 85 kg (187 lb 6.4 oz)   Height: 167.6 cm (66\")     General:  Patient is awake and alert.  Appears in no acute distress or discomfort.    Psych:  Affect and demeanor are appropriate.    Eyes:  Conjunctiva and sclera appear grossly normal.  Eyes track well and EOM seem to be intact.    Ears:  No gross abnormalities.  Hearing adequate for the exam.    Cardiovascular:  Regular rate and rhythm.    Lungs:  Good chest expansion.  Breathing unlabored.    Lymph:  No palpable adenopathy about neck or axilla.    Neck:  Supple.  Normal ROM.  Negative Spurling's for shoulder or arm pain.    Right shoulder is examined.  Skin is benign.  No gross abnormalities on inspection including any atrophy, swellings, or masses.  No palpable masses or adenopathy.  Focal tenderness noted over the acromioclavicular joint.  Full shoulder motion.  No evident instability or " apprehension.  Positive Neer, Luo, and active compression maneuvers.  Negative Speed's and Yergason's manuevers.  Weakness with forward elevation in the scapular plane.  Positive belly press and bear hugger's.  Good strength in the deltoid, biceps, triceps, and .  Intact sensation throughout the arm.  Brisk cap refill.  Palpable radial pulse.  Good skin turgor.         Radiology:   AP, scapular Y, and axillary views of the right shoulder are ordered by myself and reviewed to evaluate the patient's complaint.  No comparison films are immediately available.  The x-rays show moderate acromioclavicular arthritis.  There are no obvious acute abnormalities, lesions, masses, significant glenohumeral degenerative changes, or other concerning findings.  The acromiohumeral interval is normal.  Glenoid version appears normal as well.      MRI of the right shoulder is reviewed along with the associated report.  She has a full-thickness, retracted tear of the subscapularis.  There appears to be some muscular atrophy as well.  There is a full-thickness tear of the supraspinatus with minimal retraction.  No significant atrophy of the supraspinatus.  The long head of the biceps is torn and retracted.  She has moderate acromioclavicular arthritis with subarticular impingement.    Assessment/Plan:   Right rotator cuff tear, acromioclavicular arthritis    We discussed the natural history of full-thickness rotator cuff tears.  We discussed conservative treatment options versus surgical treatment.  She is potentially a candidate for an arthroscopic repair.  The atrophy of the subscapularis suggests that this is chronic.  The supraspinatus tear could be new.   I explained that there is good evidence in the orthopedic literature that arthroscopic intervention can be beneficial even in the setting of chronic subscapularis tears.  The atrophy is unlikely to resolve but her symptomatology may improve with the surgery.  The  alternative surgical option would be an arthroplasty and I do not consider her a candidate for that at this time.  We discussed an arthroscopic repair and distal clavicle excision and all that this would potentially entail in terms of the risk, benefits and alternatives as well as the rehab and recovery.  The surgery would keep her out of work for likely 5 to 6 months.  She asked a number of questions which were answered in detail.  She is going to think it over.  She will let us know how she wants to proceed.    Alverto Auguste MD    11/08/2019    CC to Lisa Malone MD

## 2020-02-05 ENCOUNTER — TRANSCRIBE ORDERS (OUTPATIENT)
Dept: ADMINISTRATIVE | Facility: HOSPITAL | Age: 70
End: 2020-02-05

## 2020-02-05 DIAGNOSIS — Z12.31 VISIT FOR SCREENING MAMMOGRAM: Primary | ICD-10-CM

## 2020-02-13 DIAGNOSIS — E03.9 HYPOTHYROIDISM, UNSPECIFIED TYPE: ICD-10-CM

## 2020-02-13 DIAGNOSIS — E78.5 HYPERLIPIDEMIA, UNSPECIFIED HYPERLIPIDEMIA TYPE: Primary | ICD-10-CM

## 2020-02-14 LAB
ALBUMIN SERPL-MCNC: 4.7 G/DL (ref 3.5–5.2)
ALBUMIN/GLOB SERPL: 1.7 G/DL
ALP SERPL-CCNC: 79 U/L (ref 39–117)
ALT SERPL-CCNC: 14 U/L (ref 1–33)
APPEARANCE UR: CLEAR
AST SERPL-CCNC: 17 U/L (ref 1–32)
BACTERIA #/AREA URNS HPF: ABNORMAL /HPF
BASOPHILS # BLD AUTO: 0.03 10*3/MM3 (ref 0–0.2)
BASOPHILS NFR BLD AUTO: 0.6 % (ref 0–1.5)
BILIRUB SERPL-MCNC: 0.8 MG/DL (ref 0.2–1.2)
BILIRUB UR QL STRIP: NEGATIVE
BUN SERPL-MCNC: 19 MG/DL (ref 8–23)
BUN/CREAT SERPL: 20.2 (ref 7–25)
CALCIUM SERPL-MCNC: 9.4 MG/DL (ref 8.6–10.5)
CASTS URNS MICRO: ABNORMAL
CHLORIDE SERPL-SCNC: 101 MMOL/L (ref 98–107)
CHOLEST SERPL-MCNC: 266 MG/DL (ref 0–200)
CO2 SERPL-SCNC: 26.4 MMOL/L (ref 22–29)
COLOR UR: YELLOW
CREAT SERPL-MCNC: 0.94 MG/DL (ref 0.57–1)
EOSINOPHIL # BLD AUTO: 0.11 10*3/MM3 (ref 0–0.4)
EOSINOPHIL NFR BLD AUTO: 2.2 % (ref 0.3–6.2)
EPI CELLS #/AREA URNS HPF: ABNORMAL /HPF
ERYTHROCYTE [DISTWIDTH] IN BLOOD BY AUTOMATED COUNT: 12.9 % (ref 12.3–15.4)
GLOBULIN SER CALC-MCNC: 2.7 GM/DL
GLUCOSE SERPL-MCNC: 90 MG/DL (ref 65–99)
GLUCOSE UR QL: NEGATIVE
HCT VFR BLD AUTO: 46.6 % (ref 34–46.6)
HDLC SERPL-MCNC: 98 MG/DL (ref 40–60)
HGB BLD-MCNC: 15.8 G/DL (ref 12–15.9)
HGB UR QL STRIP: NEGATIVE
IMM GRANULOCYTES # BLD AUTO: 0.03 10*3/MM3 (ref 0–0.05)
IMM GRANULOCYTES NFR BLD AUTO: 0.6 % (ref 0–0.5)
KETONES UR QL STRIP: NEGATIVE
LDLC SERPL CALC-MCNC: 149 MG/DL (ref 0–100)
LEUKOCYTE ESTERASE UR QL STRIP: ABNORMAL
LYMPHOCYTES # BLD AUTO: 1.5 10*3/MM3 (ref 0.7–3.1)
LYMPHOCYTES NFR BLD AUTO: 29.4 % (ref 19.6–45.3)
MCH RBC QN AUTO: 31.3 PG (ref 26.6–33)
MCHC RBC AUTO-ENTMCNC: 33.9 G/DL (ref 31.5–35.7)
MCV RBC AUTO: 92.3 FL (ref 79–97)
MONOCYTES # BLD AUTO: 0.47 10*3/MM3 (ref 0.1–0.9)
MONOCYTES NFR BLD AUTO: 9.2 % (ref 5–12)
NEUTROPHILS # BLD AUTO: 2.96 10*3/MM3 (ref 1.7–7)
NEUTROPHILS NFR BLD AUTO: 58 % (ref 42.7–76)
NITRITE UR QL STRIP: NEGATIVE
NRBC BLD AUTO-RTO: 0 /100 WBC (ref 0–0.2)
PH UR STRIP: 6 [PH] (ref 5–8)
PLATELET # BLD AUTO: 312 10*3/MM3 (ref 140–450)
POTASSIUM SERPL-SCNC: 3.9 MMOL/L (ref 3.5–5.2)
PROT SERPL-MCNC: 7.4 G/DL (ref 6–8.5)
PROT UR QL STRIP: NEGATIVE
RBC # BLD AUTO: 5.05 10*6/MM3 (ref 3.77–5.28)
RBC #/AREA URNS HPF: ABNORMAL /HPF
SODIUM SERPL-SCNC: 141 MMOL/L (ref 136–145)
SP GR UR: 1.02 (ref 1–1.03)
TRIGL SERPL-MCNC: 96 MG/DL (ref 0–150)
TSH SERPL DL<=0.005 MIU/L-ACNC: 4.24 UIU/ML (ref 0.27–4.2)
UROBILINOGEN UR STRIP-MCNC: ABNORMAL MG/DL
VLDLC SERPL CALC-MCNC: 19.2 MG/DL
WBC # BLD AUTO: 5.1 10*3/MM3 (ref 3.4–10.8)
WBC #/AREA URNS HPF: ABNORMAL /HPF

## 2020-02-19 ENCOUNTER — OFFICE VISIT (OUTPATIENT)
Dept: INTERNAL MEDICINE | Facility: CLINIC | Age: 70
End: 2020-02-19

## 2020-02-19 VITALS
DIASTOLIC BLOOD PRESSURE: 98 MMHG | OXYGEN SATURATION: 98 % | BODY MASS INDEX: 29.09 KG/M2 | HEIGHT: 66 IN | WEIGHT: 181 LBS | HEART RATE: 61 BPM | SYSTOLIC BLOOD PRESSURE: 154 MMHG

## 2020-02-19 DIAGNOSIS — Z00.00 WELL ADULT EXAM: ICD-10-CM

## 2020-02-19 DIAGNOSIS — E78.49 OTHER HYPERLIPIDEMIA: ICD-10-CM

## 2020-02-19 DIAGNOSIS — Z13.6 ENCOUNTER FOR SCREENING FOR VASCULAR DISEASE: ICD-10-CM

## 2020-02-19 DIAGNOSIS — Z00.00 MEDICARE ANNUAL WELLNESS VISIT, SUBSEQUENT: Primary | ICD-10-CM

## 2020-02-19 PROBLEM — R03.0 WHITE COAT SYNDROME WITHOUT DIAGNOSIS OF HYPERTENSION: Status: ACTIVE | Noted: 2020-02-19

## 2020-02-19 PROCEDURE — 99397 PER PM REEVAL EST PAT 65+ YR: CPT | Performed by: INTERNAL MEDICINE

## 2020-02-19 PROCEDURE — G0439 PPPS, SUBSEQ VISIT: HCPCS | Performed by: INTERNAL MEDICINE

## 2020-02-19 PROCEDURE — 96160 PT-FOCUSED HLTH RISK ASSMT: CPT | Performed by: INTERNAL MEDICINE

## 2020-02-19 NOTE — PROGRESS NOTES
Subjective     Gina Gonzalez is a 70 y.o. female who presents for an annual wellness visit/cpe as well as check up of HLD.      History of Present Illness     HLD.  Cholesterol has increased.  She eats two eggs day.      Review of Systems   Constitutional: Negative.    HENT: Negative.    Eyes: Negative.    Respiratory: Negative.    Cardiovascular: Negative.    Gastrointestinal: Negative.    Endocrine: Negative.    Genitourinary: Negative.    Musculoskeletal: Negative.    Skin: Negative.    Allergic/Immunologic: Negative.    Neurological: Negative.    Hematological: Negative.    Psychiatric/Behavioral: Negative.        The following portions of the patient's history were reviewed and updated as appropriate: allergies, current medications, past family history, past medical history, past social history, past surgical history and problem list.  Health maintenance tab was reviewed and updated with the patient.       Patient Active Problem List    Diagnosis Date Noted   • Colon cancer screening 03/20/2019     Note Last Updated: 3/20/2019     Added automatically from request for surgery [id number]     • Other hyperlipidemia 02/22/2019   • Abnormal TSH 02/12/2019     Note Last Updated: 2/12/2019     By history     • Primary osteoarthritis of both hips 02/11/2019       Past Medical History:   Diagnosis Date   • Arthritis        Past Surgical History:   Procedure Laterality Date   • ABDOMINOPLASTY     • BREAST BIOPSY     • COLONOSCOPY N/A 5/24/2019    Procedure: COLONOSCOPY TO CECUM AND TI;  Surgeon: Cecilia Aviles MD;  Location: [facility] ENDOSCOPY;  Service: Gastroenterology   • EYE SURGERY     • MENISCECTOMY Right    • TOTAL HIP ARTHROPLASTY Bilateral        Family History   Problem Relation Age of Onset   • Aneurysm Mother    • Lung cancer Father    • Coronary artery disease Brother        Social History     Socioeconomic History   • Marital status:      Spouse name: Not on file   • Number of children: Not on file   •  "Years of education: Not on file   • Highest education level: Not on file   Tobacco Use   • Smoking status: Former Smoker     Last attempt to quit: 1980     Years since quittin.1   • Smokeless tobacco: Never Used   Substance and Sexual Activity   • Alcohol use: Yes     Alcohol/week: 7.0 standard drinks     Types: 7 Glasses of wine per week     Drinks per session: 1 or 2   • Drug use: No   • Sexual activity: Defer       No current outpatient medications on file prior to visit.     No current facility-administered medications on file prior to visit.        No Known Allergies    Immunization History   Administered Date(s) Administered   • Pneumococcal Conjugate 13-Valent (PCV13) 2019   • Tdap 2019   • Zostavax 2013       Objective     /98   Pulse 61   Ht 167.6 cm (65.98\")   Wt 82.1 kg (181 lb)   SpO2 98%   BMI 29.23 kg/m²     Physical Exam   Constitutional: She is oriented to person, place, and time. She appears well-developed and well-nourished.   HENT:   Head: Normocephalic and atraumatic.   Right Ear: Hearing, tympanic membrane and external ear normal.   Left Ear: Hearing, tympanic membrane and external ear normal.   Nose: Nose normal.   Mouth/Throat: Oropharynx is clear and moist.   Neck: Neck supple. No thyromegaly present.   Cardiovascular: Normal rate, regular rhythm and normal heart sounds.   No murmur heard.  Pulmonary/Chest: Effort normal and breath sounds normal. Right breast exhibits no mass. Left breast exhibits no mass. No breast tenderness.   Abdominal: Soft. She exhibits no distension. There is no hepatosplenomegaly. There is no tenderness.   Genitourinary: No breast tenderness.   Lymphadenopathy:     She has no cervical adenopathy.   Neurological: She is alert and oriented to person, place, and time.   Skin: Skin is warm and dry.   Psychiatric: She has a normal mood and affect. Her speech is normal and behavior is normal. Judgment and thought content normal. Cognition " and memory are normal.       Assessment/Plan   Gina was seen today for medicare wellness-subsequent.    Diagnoses and all orders for this visit:    Medicare annual wellness visit, subsequent    Well adult exam    Encounter for screening for vascular disease  -     Vascular Screening (Bundle) CAR; Future    Other hyperlipidemia    Other orders  -     Cancel: Pneumococcal Polysaccharide Vaccine 23-Valent Greater Than or Equal To 1yo Subcutaneous / IM        Discussion    AWV/cpe.  See scanned forms and/or computer for julia history, PHQ-9, functional ability questionnaire, cognitive impairment screening.  Direct observation of cognitive abilities:  The patient does not exhibit any impairment in cognitive abilities upon direct observation at today's visit.   These were all reviewed with the patient and the patient was provided with a personal prevention plan of service in patient instructions.  Patient was given advice or information on the following topics:  nutrition, exercise.  ACP discussion was held with the patient during this visit. Patient does not have an advance directive, information provided..  HLD.  We discussed diet.  I recommend a diet high in fruits, vegetables, whole grains, lean meats, nuts and beans.  I recommend limiting red meat, full fat dairy, eggs and processed white carbohydrates.  I recommend aerobic exercise at least 3 days per week. I also recommend to watch salt intake.    I have recommended that the patient get the following immunizations:  Shingrix and Pneumovax.    Lipid panel and pneumovax three months.         Health Maintenance   Topic Date Due   • ZOSTER VACCINE (2 of 3) 11/11/2013   • Pneumococcal Vaccine Once at 65 Years Old  02/19/2016   • MAMMOGRAM  03/14/2020   • LIPID PANEL  02/14/2021   • MEDICARE ANNUAL WELLNESS  02/19/2021   • TDAP/TD VACCINES (2 - Td) 02/28/2029   • COLONOSCOPY  05/24/2029   • HEPATITIS C SCREENING  Addressed   • INFLUENZA VACCINE  Addressed             Future Appointments   Date Time Provider Department Center   3/18/2020 10:15 AM [facility]   5/20/2020 10:40 AM [facility] None   5/20/2020 10:50 AM [facility] None

## 2020-02-19 NOTE — PATIENT INSTRUCTIONS
Medicare Wellness  Personal Prevention Plan of Service     Date of Office Visit:  2020  Encounter Provider:  Lisa Malone MD  Place of Service:  [facility]  Patient Name: Gina HIGUERA:  1950    As part of the Medicare Wellness portion of your visit today, we are providing you with this personalized preventive plan of services (PPPS). This plan is based upon recommendations of the [place] Preventive Services Task Force (USPSTF) and the Advisory Committee on Immunization Practices (ACIP).    This lists the preventive care services that should be considered, and provides dates of when you are due. Items listed as completed are up-to-date and do not require any further intervention.    Health Maintenance   Topic Date Due   • ZOSTER VACCINE (2 of 3) 2013   • Pneumococcal Vaccine Once at 65 Years Old  2016   • INFLUENZA VACCINE  2019   • MEDICARE ANNUAL WELLNESS  2020   • MAMMOGRAM  2020   • LIPID PANEL  2021   • TDAP/TD VACCINES (2 - Td) 2029   • COLONOSCOPY  2029   • HEPATITIS C SCREENING  Addressed       No orders of the defined types were placed in this encounter.      No follow-ups on file.

## 2020-02-26 ENCOUNTER — TELEPHONE (OUTPATIENT)
Dept: INTERNAL MEDICINE | Facility: CLINIC | Age: 70
End: 2020-02-26

## 2020-02-26 ENCOUNTER — HOSPITAL ENCOUNTER (OUTPATIENT)
Dept: CARDIOLOGY | Facility: HOSPITAL | Age: 70
Discharge: HOME | End: 2020-02-26
Admitting: INTERNAL MEDICINE

## 2020-02-26 ENCOUNTER — APPOINTMENT (OUTPATIENT)
Dept: GENERAL RADIOLOGY | Facility: HOSPITAL | Age: 70
End: 2020-02-26

## 2020-02-26 ENCOUNTER — HOSPITAL ENCOUNTER (EMERGENCY)
Facility: HOSPITAL | Age: 70
Discharge: HOME | End: 2020-02-26
Attending: EMERGENCY MEDICINE | Admitting: EMERGENCY MEDICINE

## 2020-02-26 VITALS
WEIGHT: 187 LBS | SYSTOLIC BLOOD PRESSURE: 180 MMHG | BODY MASS INDEX: 31.16 KG/M2 | HEIGHT: 65 IN | DIASTOLIC BLOOD PRESSURE: 100 MMHG | HEART RATE: 73 BPM

## 2020-02-26 VITALS
SYSTOLIC BLOOD PRESSURE: 179 MMHG | HEIGHT: 65 IN | RESPIRATION RATE: 16 BRPM | OXYGEN SATURATION: 97 % | BODY MASS INDEX: 30.82 KG/M2 | DIASTOLIC BLOOD PRESSURE: 99 MMHG | TEMPERATURE: 97 F | HEART RATE: 68 BPM | WEIGHT: 185 LBS

## 2020-02-26 DIAGNOSIS — Z13.6 ENCOUNTER FOR SCREENING FOR VASCULAR DISEASE: ICD-10-CM

## 2020-02-26 DIAGNOSIS — I49.1 PAC (PREMATURE ATRIAL CONTRACTION): Primary | ICD-10-CM

## 2020-02-26 LAB
ALBUMIN SERPL-MCNC: 4.5 G/DL (ref 3.5–5.2)
ALBUMIN/GLOB SERPL: 1.9 G/DL
ALP SERPL-CCNC: 68 U/L (ref 39–117)
ALT SERPL W P-5'-P-CCNC: 14 U/L (ref 1–33)
ANION GAP SERPL CALCULATED.3IONS-SCNC: 12.1 MMOL/L (ref 5–15)
AST SERPL-CCNC: 15 U/L (ref 1–32)
BASOPHILS # BLD AUTO: 0.02 10*3/MM3 (ref 0–0.2)
BASOPHILS NFR BLD AUTO: 0.4 % (ref 0–1.5)
BH CV ECHO MEAS - DIST AO DIAM: 1.64 CM
BH CV VAS BP LEFT ARM: NORMAL MMHG
BH CV VAS BP RIGHT ARM: NORMAL MMHG
BH CV XLRA MEAS - MID AO DIAM: 1.81 CM
BH CV XLRA MEAS - PAD LEFT ABI DP: 1
BH CV XLRA MEAS - PAD LEFT ABI PT: 1
BH CV XLRA MEAS - PAD LEFT ARM: 180 MMHG
BH CV XLRA MEAS - PAD LEFT LEG DP: 180 MMHG
BH CV XLRA MEAS - PAD LEFT LEG PT: 180 MMHG
BH CV XLRA MEAS - PAD RIGHT ABI DP: 1
BH CV XLRA MEAS - PAD RIGHT ABI PT: 1
BH CV XLRA MEAS - PAD RIGHT ARM: 176 MMHG
BH CV XLRA MEAS - PAD RIGHT LEG DP: 180 MMHG
BH CV XLRA MEAS - PAD RIGHT LEG PT: 180 MMHG
BH CV XLRA MEAS - PROX AO DIAM: 2.12 CM
BH CV XLRA MEAS LEFT ICA/CCA RATIO: 1.27
BH CV XLRA MEAS LEFT MID CCA PSV: NORMAL CM/SEC
BH CV XLRA MEAS LEFT MID ICA PSV: NORMAL CM/SEC
BH CV XLRA MEAS LEFT PROX ECA PSV: NORMAL CM/SEC
BH CV XLRA MEAS RIGHT ICA/CCA RATIO: 1.06
BH CV XLRA MEAS RIGHT MID CCA PSV: NORMAL CM/SEC
BH CV XLRA MEAS RIGHT MID ICA PSV: NORMAL CM/SEC
BH CV XLRA MEAS RIGHT PROX ECA PSV: NORMAL CM/SEC
BILIRUB SERPL-MCNC: 0.6 MG/DL (ref 0.2–1.2)
BUN BLD-MCNC: 14 MG/DL (ref 8–23)
BUN/CREAT SERPL: 18.7 (ref 7–25)
CALCIUM SPEC-SCNC: 9.5 MG/DL (ref 8.6–10.5)
CHLORIDE SERPL-SCNC: 104 MMOL/L (ref 98–107)
CO2 SERPL-SCNC: 25.9 MMOL/L (ref 22–29)
CREAT BLD-MCNC: 0.75 MG/DL (ref 0.57–1)
DEPRECATED RDW RBC AUTO: 42.6 FL (ref 37–54)
EOSINOPHIL # BLD AUTO: 0.09 10*3/MM3 (ref 0–0.4)
EOSINOPHIL NFR BLD AUTO: 1.7 % (ref 0.3–6.2)
ERYTHROCYTE [DISTWIDTH] IN BLOOD BY AUTOMATED COUNT: 12.9 % (ref 12.3–15.4)
GFR SERPL CREATININE-BSD FRML MDRD: 77 ML/MIN/1.73
GLOBULIN UR ELPH-MCNC: 2.4 GM/DL
GLUCOSE BLD-MCNC: 93 MG/DL (ref 65–99)
HCT VFR BLD AUTO: 44.7 % (ref 34–46.6)
HGB BLD-MCNC: 15.1 G/DL (ref 12–15.9)
IMM GRANULOCYTES # BLD AUTO: 0.02 10*3/MM3 (ref 0–0.05)
IMM GRANULOCYTES NFR BLD AUTO: 0.4 % (ref 0–0.5)
LYMPHOCYTES # BLD AUTO: 1.63 10*3/MM3 (ref 0.7–3.1)
LYMPHOCYTES NFR BLD AUTO: 31.6 % (ref 19.6–45.3)
MAGNESIUM SERPL-MCNC: 2.1 MG/DL (ref 1.6–2.4)
MCH RBC QN AUTO: 30.8 PG (ref 26.6–33)
MCHC RBC AUTO-ENTMCNC: 33.8 G/DL (ref 31.5–35.7)
MCV RBC AUTO: 91 FL (ref 79–97)
MONOCYTES # BLD AUTO: 0.48 10*3/MM3 (ref 0.1–0.9)
MONOCYTES NFR BLD AUTO: 9.3 % (ref 5–12)
NEUTROPHILS # BLD AUTO: 2.92 10*3/MM3 (ref 1.7–7)
NEUTROPHILS NFR BLD AUTO: 56.6 % (ref 42.7–76)
NRBC BLD AUTO-RTO: 0 /100 WBC (ref 0–0.2)
NT-PROBNP SERPL-MCNC: 240.7 PG/ML (ref 5–900)
PLATELET # BLD AUTO: 241 10*3/MM3 (ref 140–450)
PMV BLD AUTO: 9.3 FL (ref 6–12)
POTASSIUM BLD-SCNC: 3.8 MMOL/L (ref 3.5–5.2)
PROT SERPL-MCNC: 6.9 G/DL (ref 6–8.5)
RBC # BLD AUTO: 4.91 10*6/MM3 (ref 3.77–5.28)
SODIUM BLD-SCNC: 142 MMOL/L (ref 136–145)
TROPONIN T SERPL-MCNC: <0.01 NG/ML (ref 0–0.03)
WBC NRBC COR # BLD: 5.16 10*3/MM3 (ref 3.4–10.8)

## 2020-02-26 PROCEDURE — 85025 COMPLETE CBC W/AUTO DIFF WBC: CPT | Performed by: EMERGENCY MEDICINE

## 2020-02-26 PROCEDURE — 93005 ELECTROCARDIOGRAM TRACING: CPT

## 2020-02-26 PROCEDURE — 71046 X-RAY EXAM CHEST 2 VIEWS: CPT

## 2020-02-26 PROCEDURE — 93010 ELECTROCARDIOGRAM REPORT: CPT | Performed by: INTERNAL MEDICINE

## 2020-02-26 PROCEDURE — 93005 ELECTROCARDIOGRAM TRACING: CPT | Performed by: EMERGENCY MEDICINE

## 2020-02-26 PROCEDURE — 80053 COMPREHEN METABOLIC PANEL: CPT | Performed by: EMERGENCY MEDICINE

## 2020-02-26 PROCEDURE — 84484 ASSAY OF TROPONIN QUANT: CPT | Performed by: EMERGENCY MEDICINE

## 2020-02-26 PROCEDURE — 83735 ASSAY OF MAGNESIUM: CPT | Performed by: EMERGENCY MEDICINE

## 2020-02-26 PROCEDURE — 83880 ASSAY OF NATRIURETIC PEPTIDE: CPT | Performed by: EMERGENCY MEDICINE

## 2020-02-26 PROCEDURE — 93799 UNLISTED CV SVC/PROCEDURE: CPT

## 2020-02-26 PROCEDURE — 99283 EMERGENCY DEPT VISIT LOW MDM: CPT

## 2020-02-26 NOTE — TELEPHONE ENCOUNTER
FYI:  Vascular lab called and stated patient BP was 180/100 before testing and 170/90 after testing. They did EKG and heart rate was irregular. I advised them patient need to go to ER.

## 2020-02-26 NOTE — PROGRESS NOTES
Vascular Screening completed. /100 pre-screen. /90 post-screen. BP taken manually. Automatic BP erroneous readings. Heart rate irregular. Placed on monitor-Sinus Rhythm with frequent PACs. Patient notes palpitations. Dr. Malone's office notified. Recommended patient be seen in ER. Patient instructed of above and verbalized understanding.

## 2020-03-04 ENCOUNTER — EPISODE CHANGES (OUTPATIENT)
Dept: CASE MANAGEMENT | Facility: OTHER | Age: 70
End: 2020-03-04

## 2020-03-18 ENCOUNTER — HOSPITAL ENCOUNTER (OUTPATIENT)
Dept: MAMMOGRAPHY | Facility: HOSPITAL | Age: 70
Discharge: HOME | End: 2020-03-18
Admitting: INTERNAL MEDICINE

## 2020-03-18 ENCOUNTER — EPISODE CHANGES (OUTPATIENT)
Dept: CASE MANAGEMENT | Facility: OTHER | Age: 70
End: 2020-03-18

## 2020-03-18 DIAGNOSIS — Z12.31 VISIT FOR SCREENING MAMMOGRAM: ICD-10-CM

## 2020-03-18 PROCEDURE — 77067 SCR MAMMO BI INCL CAD: CPT

## 2020-03-18 PROCEDURE — 77063 BREAST TOMOSYNTHESIS BI: CPT

## 2020-03-26 ENCOUNTER — DOCUMENTATION (OUTPATIENT)
Dept: PHYSICAL THERAPY | Facility: CLINIC | Age: 70
End: 2020-03-26

## 2020-03-26 ENCOUNTER — EPISODE CHANGES (OUTPATIENT)
Dept: CASE MANAGEMENT | Facility: OTHER | Age: 70
End: 2020-03-26

## 2020-03-26 NOTE — PROGRESS NOTES
Discharge Summary  Discharge Summary from Physical Therapy Report      Dates  PT visit: 10/9/19  Number of Visits: 2    Discharge Status of Patient: See MD Note dated 10/16/19    Goals: Not Met    Discharge Plan: Continue with current home exercise program as instructed    Comments     Date of Discharge         Shell Gillespie, PT  Physical Therapist

## 2020-03-26 NOTE — PROGRESS NOTES
Discharge Summary  Discharge Summary from Physical Therapy Report      Dates  PT visit: 8/2/19  Number of Visits: 5    Discharge Status of Patient: See MD Note dated 8/2/19    Goals: All Met    Discharge Plan: Continue with current home exercise program as instructed    Comments     Date of Discharge         Shell Gillespie, PT  Physical Therapist

## 2020-03-30 ENCOUNTER — EPISODE CHANGES (OUTPATIENT)
Dept: CASE MANAGEMENT | Facility: OTHER | Age: 70
End: 2020-03-30

## 2020-05-19 ENCOUNTER — TELEPHONE (OUTPATIENT)
Dept: INTERNAL MEDICINE | Facility: CLINIC | Age: 70
End: 2020-05-19

## 2020-05-19 DIAGNOSIS — E78.49 OTHER HYPERLIPIDEMIA: Primary | ICD-10-CM

## 2020-05-20 ENCOUNTER — LAB (OUTPATIENT)
Dept: INTERNAL MEDICINE | Facility: CLINIC | Age: 70
End: 2020-05-20

## 2020-05-20 LAB
CHOLEST SERPL-MCNC: 243 MG/DL (ref 0–200)
HDLC SERPL-MCNC: 94 MG/DL (ref 40–60)
LDLC SERPL CALC-MCNC: 133 MG/DL (ref 0–100)
TRIGL SERPL-MCNC: 81 MG/DL (ref 0–150)
VLDLC SERPL CALC-MCNC: 16.2 MG/DL

## 2020-05-20 PROCEDURE — G0009 ADMIN PNEUMOCOCCAL VACCINE: HCPCS | Performed by: INTERNAL MEDICINE

## 2020-05-20 PROCEDURE — 90732 PPSV23 VACC 2 YRS+ SUBQ/IM: CPT | Performed by: INTERNAL MEDICINE

## 2020-10-12 ENCOUNTER — HOSPITAL ENCOUNTER (OUTPATIENT)
Facility: HOSPITAL | Age: 70
Setting detail: OBSERVATION
Discharge: HOME | End: 2020-10-14
Attending: EMERGENCY MEDICINE | Admitting: INTERNAL MEDICINE

## 2020-10-12 ENCOUNTER — APPOINTMENT (OUTPATIENT)
Dept: CT IMAGING | Facility: HOSPITAL | Age: 70
End: 2020-10-12

## 2020-10-12 DIAGNOSIS — R90.89 ABNORMAL CT OF BRAIN: ICD-10-CM

## 2020-10-12 DIAGNOSIS — G45.4 TGA (TRANSIENT GLOBAL AMNESIA): Primary | ICD-10-CM

## 2020-10-12 LAB
BASOPHILS # BLD AUTO: 0.04 10*3/MM3 (ref 0–0.2)
BASOPHILS NFR BLD AUTO: 0.5 % (ref 0–1.5)
DEPRECATED RDW RBC AUTO: 44.7 FL (ref 37–54)
EOSINOPHIL # BLD AUTO: 0.1 10*3/MM3 (ref 0–0.4)
EOSINOPHIL NFR BLD AUTO: 1.4 % (ref 0.3–6.2)
ERYTHROCYTE [DISTWIDTH] IN BLOOD BY AUTOMATED COUNT: 13.2 % (ref 12.3–15.4)
HCT VFR BLD AUTO: 44.3 % (ref 34–46.6)
HGB BLD-MCNC: 14.9 G/DL (ref 12–15.9)
IMM GRANULOCYTES # BLD AUTO: 0.03 10*3/MM3 (ref 0–0.05)
IMM GRANULOCYTES NFR BLD AUTO: 0.4 % (ref 0–0.5)
LYMPHOCYTES # BLD AUTO: 1.35 10*3/MM3 (ref 0.7–3.1)
LYMPHOCYTES NFR BLD AUTO: 18.4 % (ref 19.6–45.3)
MCH RBC QN AUTO: 31.2 PG (ref 26.6–33)
MCHC RBC AUTO-ENTMCNC: 33.6 G/DL (ref 31.5–35.7)
MCV RBC AUTO: 92.9 FL (ref 79–97)
MONOCYTES # BLD AUTO: 0.51 10*3/MM3 (ref 0.1–0.9)
MONOCYTES NFR BLD AUTO: 7 % (ref 5–12)
NEUTROPHILS NFR BLD AUTO: 5.3 10*3/MM3 (ref 1.7–7)
NEUTROPHILS NFR BLD AUTO: 72.3 % (ref 42.7–76)
NRBC BLD AUTO-RTO: 0 /100 WBC (ref 0–0.2)
PLATELET # BLD AUTO: 255 10*3/MM3 (ref 140–450)
PMV BLD AUTO: 9.1 FL (ref 6–12)
RBC # BLD AUTO: 4.77 10*6/MM3 (ref 3.77–5.28)
WBC # BLD AUTO: 7.33 10*3/MM3 (ref 3.4–10.8)

## 2020-10-12 PROCEDURE — 85025 COMPLETE CBC W/AUTO DIFF WBC: CPT | Performed by: EMERGENCY MEDICINE

## 2020-10-12 PROCEDURE — 70450 CT HEAD/BRAIN W/O DYE: CPT

## 2020-10-12 PROCEDURE — 80053 COMPREHEN METABOLIC PANEL: CPT | Performed by: EMERGENCY MEDICINE

## 2020-10-12 PROCEDURE — 99284 EMERGENCY DEPT VISIT MOD MDM: CPT

## 2020-10-12 PROCEDURE — 80307 DRUG TEST PRSMV CHEM ANLYZR: CPT | Performed by: EMERGENCY MEDICINE

## 2020-10-12 RX ORDER — LORAZEPAM 2 MG/ML
1 INJECTION INTRAMUSCULAR ONCE
Status: COMPLETED | OUTPATIENT
Start: 2020-10-13 | End: 2020-10-13

## 2020-10-12 RX ORDER — SODIUM CHLORIDE 0.9 % (FLUSH) 0.9 %
10 SYRINGE (ML) INJECTION AS NEEDED
Status: DISCONTINUED | OUTPATIENT
Start: 2020-10-12 | End: 2020-10-14 | Stop reason: HOSPADM

## 2020-10-13 ENCOUNTER — APPOINTMENT (OUTPATIENT)
Dept: MRI IMAGING | Facility: HOSPITAL | Age: 70
End: 2020-10-13

## 2020-10-13 ENCOUNTER — APPOINTMENT (OUTPATIENT)
Dept: NEUROLOGY | Facility: HOSPITAL | Age: 70
End: 2020-10-13

## 2020-10-13 ENCOUNTER — APPOINTMENT (OUTPATIENT)
Dept: CT IMAGING | Facility: HOSPITAL | Age: 70
End: 2020-10-13

## 2020-10-13 PROBLEM — G45.4 TGA (TRANSIENT GLOBAL AMNESIA): Status: ACTIVE | Noted: 2020-10-13

## 2020-10-13 LAB
ALBUMIN SERPL-MCNC: 4.4 G/DL (ref 3.5–5.2)
ALBUMIN/GLOB SERPL: 1.6 G/DL
ALP SERPL-CCNC: 75 U/L (ref 39–117)
ALT SERPL W P-5'-P-CCNC: 17 U/L (ref 1–33)
AMPHET+METHAMPHET UR QL: NEGATIVE
ANION GAP SERPL CALCULATED.3IONS-SCNC: 10.7 MMOL/L (ref 5–15)
ANION GAP SERPL CALCULATED.3IONS-SCNC: 8.5 MMOL/L (ref 5–15)
AST SERPL-CCNC: 21 U/L (ref 1–32)
BACTERIA UR QL AUTO: ABNORMAL /HPF
BARBITURATES UR QL SCN: NEGATIVE
BENZODIAZ UR QL SCN: NEGATIVE
BILIRUB SERPL-MCNC: 0.4 MG/DL (ref 0–1.2)
BILIRUB UR QL STRIP: NEGATIVE
BUN SERPL-MCNC: 14 MG/DL (ref 8–23)
BUN SERPL-MCNC: 16 MG/DL (ref 8–23)
BUN/CREAT SERPL: 18.4 (ref 7–25)
BUN/CREAT SERPL: 20.5 (ref 7–25)
CALCIUM SPEC-SCNC: 8.5 MG/DL (ref 8.6–10.5)
CALCIUM SPEC-SCNC: 9.5 MG/DL (ref 8.6–10.5)
CANNABINOIDS SERPL QL: NEGATIVE
CHLORIDE SERPL-SCNC: 106 MMOL/L (ref 98–107)
CHLORIDE SERPL-SCNC: 107 MMOL/L (ref 98–107)
CHOLEST SERPL-MCNC: 206 MG/DL (ref 0–200)
CLARITY UR: CLEAR
CO2 SERPL-SCNC: 22.5 MMOL/L (ref 22–29)
CO2 SERPL-SCNC: 25.3 MMOL/L (ref 22–29)
COCAINE UR QL: NEGATIVE
COLOR UR: YELLOW
CREAT SERPL-MCNC: 0.76 MG/DL (ref 0.57–1)
CREAT SERPL-MCNC: 0.78 MG/DL (ref 0.57–1)
DEPRECATED RDW RBC AUTO: 45.1 FL (ref 37–54)
ERYTHROCYTE [DISTWIDTH] IN BLOOD BY AUTOMATED COUNT: 13 % (ref 12.3–15.4)
ETHANOL BLD-MCNC: <10 MG/DL (ref 0–10)
ETHANOL UR QL: <0.01 %
FOLATE SERPL-MCNC: 17.9 NG/ML (ref 4.78–24.2)
GFR SERPL CREATININE-BSD FRML MDRD: 73 ML/MIN/1.73
GFR SERPL CREATININE-BSD FRML MDRD: 75 ML/MIN/1.73
GLOBULIN UR ELPH-MCNC: 2.8 GM/DL
GLUCOSE SERPL-MCNC: 108 MG/DL (ref 65–99)
GLUCOSE SERPL-MCNC: 90 MG/DL (ref 65–99)
GLUCOSE UR STRIP-MCNC: NEGATIVE MG/DL
HBA1C MFR BLD: 5.22 % (ref 4.8–5.6)
HCT VFR BLD AUTO: 41.5 % (ref 34–46.6)
HDLC SERPL-MCNC: 74 MG/DL (ref 40–60)
HGB BLD-MCNC: 13.7 G/DL (ref 12–15.9)
HGB UR QL STRIP.AUTO: NEGATIVE
HYALINE CASTS UR QL AUTO: ABNORMAL /LPF
KETONES UR QL STRIP: NEGATIVE
LDLC SERPL CALC-MCNC: 116 MG/DL (ref 0–100)
LDLC/HDLC SERPL: 1.54 {RATIO}
LEUKOCYTE ESTERASE UR QL STRIP.AUTO: ABNORMAL
MCH RBC QN AUTO: 31 PG (ref 26.6–33)
MCHC RBC AUTO-ENTMCNC: 33 G/DL (ref 31.5–35.7)
MCV RBC AUTO: 93.9 FL (ref 79–97)
METHADONE UR QL SCN: NEGATIVE
NITRITE UR QL STRIP: NEGATIVE
OPIATES UR QL: NEGATIVE
OXYCODONE UR QL SCN: NEGATIVE
PH UR STRIP.AUTO: 7 [PH] (ref 5–8)
PLATELET # BLD AUTO: 240 10*3/MM3 (ref 140–450)
PMV BLD AUTO: 9.4 FL (ref 6–12)
POTASSIUM SERPL-SCNC: 4 MMOL/L (ref 3.5–5.2)
POTASSIUM SERPL-SCNC: 4.2 MMOL/L (ref 3.5–5.2)
PROT SERPL-MCNC: 7.2 G/DL (ref 6–8.5)
PROT UR QL STRIP: NEGATIVE
RBC # BLD AUTO: 4.42 10*6/MM3 (ref 3.77–5.28)
RBC # UR: ABNORMAL /HPF
REF LAB TEST METHOD: ABNORMAL
RPR SER QL: NORMAL
SARS-COV-2 RNA RESP QL NAA+PROBE: NOT DETECTED
SODIUM SERPL-SCNC: 138 MMOL/L (ref 136–145)
SODIUM SERPL-SCNC: 142 MMOL/L (ref 136–145)
SP GR UR STRIP: 1.01 (ref 1–1.03)
SQUAMOUS #/AREA URNS HPF: ABNORMAL /HPF
TRIGL SERPL-MCNC: 91 MG/DL (ref 0–150)
TSH SERPL DL<=0.05 MIU/L-ACNC: 6.3 UIU/ML (ref 0.27–4.2)
UROBILINOGEN UR QL STRIP: ABNORMAL
VIT B12 BLD-MCNC: 336 PG/ML (ref 211–946)
VLDLC SERPL-MCNC: 16 MG/DL (ref 5–40)
WBC # BLD AUTO: 5.86 10*3/MM3 (ref 3.4–10.8)
WBC UR QL AUTO: ABNORMAL /HPF

## 2020-10-13 PROCEDURE — 96375 TX/PRO/DX INJ NEW DRUG ADDON: CPT

## 2020-10-13 PROCEDURE — 80307 DRUG TEST PRSMV CHEM ANLYZR: CPT | Performed by: EMERGENCY MEDICINE

## 2020-10-13 PROCEDURE — 96372 THER/PROPH/DIAG INJ SC/IM: CPT

## 2020-10-13 PROCEDURE — 90791 PSYCH DIAGNOSTIC EVALUATION: CPT | Performed by: SOCIAL WORKER

## 2020-10-13 PROCEDURE — 95816 EEG AWAKE AND DROWSY: CPT | Performed by: PSYCHIATRY & NEUROLOGY

## 2020-10-13 PROCEDURE — G0378 HOSPITAL OBSERVATION PER HR: HCPCS

## 2020-10-13 PROCEDURE — 25010000002 CYANOCOBALAMIN PER 1000 MCG: Performed by: NURSE PRACTITIONER

## 2020-10-13 PROCEDURE — 70553 MRI BRAIN STEM W/O & W/DYE: CPT

## 2020-10-13 PROCEDURE — 0 GADOBENATE DIMEGLUMINE 529 MG/ML SOLUTION: Performed by: HOSPITALIST

## 2020-10-13 PROCEDURE — C9803 HOPD COVID-19 SPEC COLLECT: HCPCS

## 2020-10-13 PROCEDURE — 70498 CT ANGIOGRAPHY NECK: CPT

## 2020-10-13 PROCEDURE — 82607 VITAMIN B-12: CPT | Performed by: NURSE PRACTITIONER

## 2020-10-13 PROCEDURE — 95816 EEG AWAKE AND DROWSY: CPT

## 2020-10-13 PROCEDURE — U0004 COV-19 TEST NON-CDC HGH THRU: HCPCS | Performed by: EMERGENCY MEDICINE

## 2020-10-13 PROCEDURE — 0 IOPAMIDOL PER 1 ML: Performed by: HOSPITALIST

## 2020-10-13 PROCEDURE — 82746 ASSAY OF FOLIC ACID SERUM: CPT | Performed by: NURSE PRACTITIONER

## 2020-10-13 PROCEDURE — 96374 THER/PROPH/DIAG INJ IV PUSH: CPT

## 2020-10-13 PROCEDURE — 99203 OFFICE O/P NEW LOW 30 MIN: CPT | Performed by: NURSE PRACTITIONER

## 2020-10-13 PROCEDURE — 96361 HYDRATE IV INFUSION ADD-ON: CPT

## 2020-10-13 PROCEDURE — 80061 LIPID PANEL: CPT | Performed by: NURSE PRACTITIONER

## 2020-10-13 PROCEDURE — 85027 COMPLETE CBC AUTOMATED: CPT | Performed by: NURSE PRACTITIONER

## 2020-10-13 PROCEDURE — 70496 CT ANGIOGRAPHY HEAD: CPT

## 2020-10-13 PROCEDURE — 84443 ASSAY THYROID STIM HORMONE: CPT | Performed by: NURSE PRACTITIONER

## 2020-10-13 PROCEDURE — 81001 URINALYSIS AUTO W/SCOPE: CPT | Performed by: EMERGENCY MEDICINE

## 2020-10-13 PROCEDURE — 80048 BASIC METABOLIC PNL TOTAL CA: CPT | Performed by: NURSE PRACTITIONER

## 2020-10-13 PROCEDURE — 25010000002 LORAZEPAM PER 2 MG: Performed by: EMERGENCY MEDICINE

## 2020-10-13 PROCEDURE — 83036 HEMOGLOBIN GLYCOSYLATED A1C: CPT | Performed by: NURSE PRACTITIONER

## 2020-10-13 PROCEDURE — 86592 SYPHILIS TEST NON-TREP QUAL: CPT | Performed by: NURSE PRACTITIONER

## 2020-10-13 PROCEDURE — A9577 INJ MULTIHANCE: HCPCS | Performed by: HOSPITALIST

## 2020-10-13 RX ORDER — ACETAMINOPHEN 160 MG/5ML
650 SOLUTION ORAL EVERY 4 HOURS PRN
Status: DISCONTINUED | OUTPATIENT
Start: 2020-10-13 | End: 2020-10-14 | Stop reason: HOSPADM

## 2020-10-13 RX ORDER — ACETAMINOPHEN 650 MG/1
650 SUPPOSITORY RECTAL EVERY 4 HOURS PRN
Status: DISCONTINUED | OUTPATIENT
Start: 2020-10-13 | End: 2020-10-14 | Stop reason: HOSPADM

## 2020-10-13 RX ORDER — BISACODYL 5 MG/1
5 TABLET, DELAYED RELEASE ORAL DAILY PRN
Status: DISCONTINUED | OUTPATIENT
Start: 2020-10-13 | End: 2020-10-14 | Stop reason: HOSPADM

## 2020-10-13 RX ORDER — CYANOCOBALAMIN 1000 UG/ML
1000 INJECTION, SOLUTION INTRAMUSCULAR; SUBCUTANEOUS ONCE
Status: COMPLETED | OUTPATIENT
Start: 2020-10-13 | End: 2020-10-13

## 2020-10-13 RX ORDER — ASPIRIN 81 MG/1
81 TABLET, CHEWABLE ORAL DAILY
Status: DISCONTINUED | OUTPATIENT
Start: 2020-10-13 | End: 2020-10-14 | Stop reason: HOSPADM

## 2020-10-13 RX ORDER — NITROGLYCERIN 0.4 MG/1
0.4 TABLET SUBLINGUAL
Status: DISCONTINUED | OUTPATIENT
Start: 2020-10-13 | End: 2020-10-14 | Stop reason: HOSPADM

## 2020-10-13 RX ORDER — CHOLECALCIFEROL (VITAMIN D3) 125 MCG
1000 CAPSULE ORAL DAILY
Status: DISCONTINUED | OUTPATIENT
Start: 2020-10-14 | End: 2020-10-14 | Stop reason: HOSPADM

## 2020-10-13 RX ORDER — CALCIUM CARBONATE 200(500)MG
2 TABLET,CHEWABLE ORAL 2 TIMES DAILY PRN
Status: DISCONTINUED | OUTPATIENT
Start: 2020-10-13 | End: 2020-10-14 | Stop reason: HOSPADM

## 2020-10-13 RX ORDER — ACETAMINOPHEN 325 MG/1
650 TABLET ORAL EVERY 4 HOURS PRN
Status: DISCONTINUED | OUTPATIENT
Start: 2020-10-13 | End: 2020-10-14 | Stop reason: HOSPADM

## 2020-10-13 RX ORDER — SODIUM CHLORIDE 0.9 % (FLUSH) 0.9 %
10 SYRINGE (ML) INJECTION EVERY 12 HOURS SCHEDULED
Status: DISCONTINUED | OUTPATIENT
Start: 2020-10-13 | End: 2020-10-14 | Stop reason: HOSPADM

## 2020-10-13 RX ORDER — SODIUM CHLORIDE 0.9 % (FLUSH) 0.9 %
10 SYRINGE (ML) INJECTION AS NEEDED
Status: DISCONTINUED | OUTPATIENT
Start: 2020-10-13 | End: 2020-10-14 | Stop reason: HOSPADM

## 2020-10-13 RX ORDER — ATORVASTATIN CALCIUM 20 MG/1
40 TABLET, FILM COATED ORAL DAILY
Status: DISCONTINUED | OUTPATIENT
Start: 2020-10-13 | End: 2020-10-14 | Stop reason: HOSPADM

## 2020-10-13 RX ORDER — ONDANSETRON 4 MG/1
4 TABLET, FILM COATED ORAL EVERY 6 HOURS PRN
Status: DISCONTINUED | OUTPATIENT
Start: 2020-10-13 | End: 2020-10-14 | Stop reason: HOSPADM

## 2020-10-13 RX ORDER — ONDANSETRON 2 MG/ML
4 INJECTION INTRAMUSCULAR; INTRAVENOUS EVERY 6 HOURS PRN
Status: DISCONTINUED | OUTPATIENT
Start: 2020-10-13 | End: 2020-10-14 | Stop reason: HOSPADM

## 2020-10-13 RX ORDER — SODIUM CHLORIDE 9 MG/ML
100 INJECTION, SOLUTION INTRAVENOUS CONTINUOUS
Status: DISCONTINUED | OUTPATIENT
Start: 2020-10-13 | End: 2020-10-13

## 2020-10-13 RX ADMIN — GADOBENATE DIMEGLUMINE 20 ML: 529 INJECTION, SOLUTION INTRAVENOUS at 02:10

## 2020-10-13 RX ADMIN — CYANOCOBALAMIN 1000 MCG: 1000 INJECTION, SOLUTION INTRAMUSCULAR at 18:47

## 2020-10-13 RX ADMIN — IOPAMIDOL 95 ML: 755 INJECTION, SOLUTION INTRAVENOUS at 16:30

## 2020-10-13 RX ADMIN — SODIUM CHLORIDE, PRESERVATIVE FREE 10 ML: 5 INJECTION INTRAVENOUS at 00:53

## 2020-10-13 RX ADMIN — SODIUM CHLORIDE 100 ML/HR: 9 INJECTION, SOLUTION INTRAVENOUS at 03:05

## 2020-10-13 RX ADMIN — ATORVASTATIN CALCIUM 40 MG: 20 TABLET, FILM COATED ORAL at 12:36

## 2020-10-13 RX ADMIN — LORAZEPAM 1 MG: 2 INJECTION INTRAMUSCULAR; INTRAVENOUS at 00:33

## 2020-10-13 RX ADMIN — SODIUM CHLORIDE, PRESERVATIVE FREE 10 ML: 5 INJECTION INTRAVENOUS at 08:25

## 2020-10-13 RX ADMIN — METOROPROLOL TARTRATE 5 MG: 5 INJECTION, SOLUTION INTRAVENOUS at 00:33

## 2020-10-13 RX ADMIN — ASPIRIN 81 MG: 81 TABLET, CHEWABLE ORAL at 12:36

## 2020-10-13 NOTE — ED TRIAGE NOTES
Pt to ER from home via PV with c/o acute memory problem. Per report from family, last known was approx 1 hour ago. Pt alert and oriented in triage. Per family, pt had called sister asking if sister in law had passed away (family member had passed away , pt does not recall being at work today.       Pt masked in triage, staff in appropriate ppe.

## 2020-10-13 NOTE — CONSULTS
70-year-old mother of 1.  Patient was brought to the emergency room after she had some increased confusion, could not remember events from this past weekend, and feeling overwhelmed from argument with her .  She had apparently been in [place] over the weekend and did not remember having been there.  [facility] was asked to see patient for depression and some possible amnestic issues.  She has had 1 other episode where she has had trouble remembering and getting confused when overwhelmed.  Patient was in her room lying in bed.  She was engaging in conversation.  Expresses much frustration with current and recent financial issues and business issues related to her 's business. She states that he has dementia but still runs a business and is forgetful.  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  They have had poor success in getting people to build the signs follow them with accuracy.  She denies any thoughts or history of suicidal ideations or suicide attempts.  She reports consuming 2 orange juice glasses of wine per night.  No history of blackouts or memory loss while intoxicated.  She denies any history of auditory visual hallucinations.  Her appetite is reported to be good.  Sleep is broken.    Patient does not use any illicit drugs.  Alcohol use is as above mentioned.  He has seen a counselor in the past regarding marital issues.  He states that her  at the time for which she had 2 children with needing to get out of the relationship as he was peng.  He has been  to her current  for 15 years.  No use of illicit drugs.    Patient lives with her  of 15 years.  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  They are living in a house now.  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  She enjoys her job there because it is constant and it keeps her busy.  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  She has a technical degree in art.  She is Worship and attends on occasion.    Patient was alert and oriented x4.  No SI or HI.  No auditory visual hallucinations.  She was appropriate in her conversation.  She admits to not remembering the weekend.  He was able to recall the current and 3 most recent past presidents.  New she was at [facility] in her room number.  He states she is not depressed and gave depression is 0 on a scale of 0-10 and anxiety as a 4 on a scale of 0-10.  He states at times when with  it can get up to a 10.    Monica support and discussed coping skills to deal with stress in the relationship.  Also provided a list of outpatient resources for her to get additional help to deal with her stressors.  Her about struggles with her memory herself and that neuropsych testing may be beneficial in the future after she is talk to others about the amount of stress that she is under.  No further need for the [facility] to continue to follow this lady.  Her situation change please reconsult [facility]

## 2020-10-13 NOTE — CONSULTS
Neurology Consult Note    Consult Date: 10/13/2020    Referring MD: Didier Astudillo MD    Reason for Consult I have been asked to see the patient in neurological consultation to render advice and opinion regarding AMS    Gina Gonzalez is a 70 y.o. RH female, non-smoker, with past medical history of HLD, hip arthroplasty and abdominal plasty, on no medications prior to arrival, who presented 10/12 with acute confusional spell. History provided by the patient. The patient states she worked yesterday at [employer] without issue, confirmed by her sister in law and boss Blanka, went to the tydy on the way home and when she got home her  started asking her multiple questions before she could put groceries away and she became very stressed and then confused. She states she was told that she called a family member to ask if a sister in law was  and she doesn't remember this. This sister in law had apparently  4 years prior. The patient also forgot she'd traveled out of town over the weekend. The patient continues to be confused today per Blanka. The patient states she just gets confused when she's really stressed out and attributes stress to her  who has had a previous brain hemorrhage and has been found to be having seizures and is undergoing a dementia evaluation. Her friend Sweta states the patient was apparently very stressed out yesterday due issues with finances, their business, and the patient's . Apparently the patient asked for a divorce yesterday.     The patient had 1 similar confusional spell 6 mo prior. She was walking with her friend Sweta and became unable to remember her friends family members. The spell lasted 10 minutes and the patient states she had been stressed out at the time.    There has not been any associated seizure activity, focal weakness/numbness, speech difficulty or associated headache.     In the ED her BP was up to 196/109.  She has been afebrile.  CMP  and CBC unrevealing.  UA with 2+ leukocytes and 6-12 WBCs, otherwise unrevealing.  Alcohol level was not elevated and UDS was negative.  Covid screening is pending.  CT head showed small focus of hyperdensity contiguous with the falx, favored to be meningioma but small subdural hematoma cannot be ruled out.  Lesion was felt more likely to be meningioma per MRI brain with and without contrast which was otherwise negative.      The patient drinks 2 glasses of red wine daily. No family history of dementia, stroke or seizure. Father  of lung CA in his 80s and mother  of a ruptured cerebral aneurysm at age 49.      Past Medical/Surgical Hx:  Past Medical History:   Diagnosis Date   • Arthritis      Past Surgical History:   Procedure Laterality Date   • ABDOMINOPLASTY     • BREAST BIOPSY     • COLONOSCOPY N/A 2019    Procedure: COLONOSCOPY TO CECUM AND TI;  Surgeon: Cecilia Aviles MD;  [facility] ENDOSCOPY;  Service: Gastroenterology   • EYE SURGERY Bilateral     eye lift   • MENISCECTOMY Right    • TOTAL HIP ARTHROPLASTY Bilateral        Medications On Admission  No medications prior to admission.     No current facility-administered medications on file prior to encounter.      No current outpatient medications on file prior to encounter.         Allergies:  No Known Allergies    Social Hx:  Social History     Socioeconomic History   • Marital status:      Spouse name: Not on file   • Number of children: Not on file   • Years of education: Not on file   • Highest education level: Not on file   Tobacco Use   • Smoking status: Former Smoker     Quit date:      Years since quittin.8   • Smokeless tobacco: Never Used   Substance and Sexual Activity   • Alcohol use: Yes     Alcohol/week: 7.0 standard drinks     Types: 7 Glasses of wine per week     Drinks per session: 1 or 2   • Drug use: No   • Sexual activity: Defer       Family Hx:  Family History   Problem Relation Age of Onset   •  "Aneurysm Mother         cerebral   • Lung cancer Father    • Coronary artery disease Brother    • Aortic aneurysm Brother        REVIEW OF SYSTEMS:   Constitutional: [No fevers, chills, sweats or weight loss/gain]   Eye: [No change in vision, double vision, or loss of vision]   HEENT: [No headaches, tenderness, dizziness, or tinnitus. Normal smell and taste. Normal speech and swallowing]   Respiratory: [No shortness of breath, coughing, wheezing]   Cardiovascular: [No Chest pain, palpitations, syncope, [place]]   Gastrointestinal: [Normal bowel function. No nausea, vomiting, diarrhea]   Genitourinary: [Normal bladder function]   Musculoskeletal: [No trauma, joint or neck pain, myalgias, cramping or weakness]   Skin: [No itching, burning, pain, rashes, or birthmarks]   Endocrinology: [No heat or cold intolerance]   Psychiatric: [No sleep disturbance. No anxiety or depression]   Neurologic: [See HPI, above]         Exam    /72 (BP Location: Right arm, Patient Position: Lying)   Pulse 58   Temp 97.7 °F (36.5 °C) (Oral)   Resp 18   Wt 84.9 kg (187 lb 2.7 oz)   SpO2 99%   BMI 31.15 kg/m²   General appearance: Well developed, well nourished, well groomed, alert and cooperative.   HEENT: Normocephalic.   Neck and spine: Normal range of motion. Normal alignment. No mass or tenderness.    Cardiac: Regular rate and rhythm. No murmurs.   Peripheral Vasculature: Peripheral pulses are equal and symmetric.  Chest Exam: Clear to auscultation bilaterally, no wheezes, no rhonchi.  Extremities: Normal, no edema.   Skin: No rashes or birthmarks.     Higher integrative function: Oriented to time, place, person impaired recent memory. Delayed recall 0/3. Able to spell \"world\" backwards.  Spontaneous speech, fund of vocabulary are normal.   CN II: Normal visual fields.   CN III IV VI: Extraocular movements are full without nystagmus. Pupils are equal, round, and reactive to light. No relative afferent pupillary defect. No " internuclear ophthalmoplegia.   CN V: Normal facial sensation and strength of muscles of mastication.   CN VII: Facial movements are symmetric, no weakness.   CN VIII: Auditory acuity is normal.   CN IX & X: Symmetric palatal movement.   CN XI: Sternocleidomastoid and trapezius are normal. No weakness.   CN XII: The tongue is midline. No atrophy or fasciculations.   Motor: Normal muscle strength, bulk, and tone in upper and lower extremities. No fasciculations, rigidity, spasticity or abnormal movements.   Sensation: Normal light touch.  Station and gait: Gait mildly broad-based.   Muscle stretch reflexes: Reflexes are normal and symmetric in the upper and lower extremities.   Plantar reflexes are flexor bilaterally.   Coordination: Finger to nose test showed no dysmetria. Rapid alternating movements were normal. Heel to shin normal.     DATA:    Lab Results   Component Value Date    GLUCOSE 90 10/13/2020    CALCIUM 8.5 (L) 10/13/2020     10/13/2020    K 4.0 10/13/2020    CO2 22.5 10/13/2020     10/13/2020    BUN 14 10/13/2020    CREATININE 0.76 10/13/2020    EGFRIFAFRI 72 02/14/2020    EGFRIFNONA 75 10/13/2020    BCR 18.4 10/13/2020    ANIONGAP 8.5 10/13/2020     Lab Results   Component Value Date    WBC 5.86 10/13/2020    HGB 13.7 10/13/2020    HCT 41.5 10/13/2020    MCV 93.9 10/13/2020     10/13/2020     No results found for: CHOL  Lab Results   Component Value Date    HDL 94 (H) 05/20/2020    HDL 98 (H) 02/14/2020    HDL 88 02/20/2019     Lab Results   Component Value Date     (H) 05/20/2020     (H) 02/14/2020     (H) 02/20/2019     Lab Results   Component Value Date    TRIG 81 05/20/2020    TRIG 96 02/14/2020    TRIG 59 02/20/2019     No results found for: HGBA1C  No results found for: INR, PROTIME    Imaging review: MRI brain images viewed by me, no acute findings seen.  CT HEAD     HISTORY:Acute memory loss.     TECHNIQUE: CT scan of the head was obtained with 3 mm  axial images  without intravenous contrast.  Radiation dose reduction techniques were  utilized, including automated exposure control and exposure modulation  based on body size.     COMPARISON:None.     FINDINGS:  There is a small hyperdense nodular focus along the falx and projecting  to the left over the high left parietal lobe measuring 0.4 x 0.8 cm. No  hydrocephalus is present.     IMPRESSION:  1.  Small focus of hyperdensity contiguous with the falx. While findings  are favored to be a meningioma, a small subdural hematoma would appear  similar. MRI with and without contrast is recommended to differentiate.     The above findings were discussed with Dr. Kelly by telephone by Ulises Russell at 11:50 PM on 10/12/2020   .      MRI BRAIN WITH AND WITHOUT CONTRAST     HISTORY:    Questionable subdural hemorrhage versus meningioma.     COMPARISON: CT head 10/12/2020.     TECHNIQUE: Multiplanar, multisequence MR imaging of the brain was  performed with and without intravenous contrast.     FINDINGS:     There is no restricted diffusion. A few tiny areas of T2 hyperintensity  within the periventricular and subcortical white matter are within  normal limits for a patient of this age.  There is no midline shift,  hydrocephalus, parenchymal hemorrhage, or abnormal extra-axial fluid  collection. The major T2 intracranial arterial flow voids appear grossly  normal. Midline structures are unremarkable.     There is a T1 isointense, T2 hyperintense dural based lesion  demonstrating avid enhancement along the falx measuring approximately  0.7 x 0.8 cm and corresponding with the recent area of hyperdensity on  noncontrast CT. This area does not demonstrate significant  susceptibility effect.     IMPRESSION:     Findings of probable small meningioma along the posterior falx. No  findings of intracranial hemorrhage.     The above findings were discussed with Dr. Kelly by telephone by Ulises Russell at 2:30 AM on  10/13/2020   .    Impression/Plan:  1) Acute AMS, patient seen with Dr Rolon who feels this is most likely stress-related. Small meningioma felt incidental. Will also check CTA h/n given family history of cerebral aneurysm as well as EEG, B12, TSH, RPR.    I s/w Patient sister in law Blanka and friend Sweta via telephone. I called patient's brother, Dr Jolley ( ), as requested but he did not answer.

## 2020-10-13 NOTE — H&P
"    Patient Name:  Gina Gonzalez  YOB: 1950  MRN:  [id number]  Admit Date:  10/12/2020  Patient Care Team:  Lisa Malone MD as PCP - General (Internal Medicine)      Subjective   History Present Illness     Chief Complaint   Patient presents with   • Memory Loss     x1 hr     HPI  Ms. Gonzalez is a 70 y.o. with no significant medical history that presents to [facility] complaining of acute memory loss. Patient works at [employer] and reports driving home yesterday but did not necessarily remember the drive. When she got home she reports her  was waiting by the door for her per usual to discuss the day. Per patient, spouse was recently diagnosed with dementia and this has taken a toll on their relationship. Patient reports \"being stressed to the max\" all the time and her only escape is work as her spouse works from home. Apparently when this was going on yesterday she left the house and went to the grocery store for a few things but does not recall going. She called a couple of her friends and inquired about her  sister in law who passed away 4 years ago, also without recollection. She had a similar episode about 6 months ago per friend, Rachele. That episode only lasted about 10 minutes. Case discussed with JULI Novoa who spoke with Rachele. States patient still does not seem back to baseline and the story she is telling does not totally add up to what happened. She currently denies chest pain, palpitations, SOA, edema, fever, chills, nausea and vomiting.        Review of Systems   Constitutional: Negative for chills and fever.   HENT: Negative for congestion and dental problem.    Eyes: Negative for discharge and itching.   Respiratory: Negative for cough and shortness of breath.    Cardiovascular: Negative for chest pain and palpitations.   Gastrointestinal: Negative for abdominal pain, nausea and vomiting.   Endocrine: Negative for cold intolerance and heat " intolerance.   Genitourinary: Negative for difficulty urinating and dysuria.   Musculoskeletal: Negative for back pain and gait problem.   Skin: Negative for color change and pallor.   Allergic/Immunologic: Negative for environmental allergies and food allergies.   Neurological: Negative for dizziness, speech difficulty and weakness.        Memory loss   Hematological: Negative for adenopathy. Does not bruise/bleed easily.   Psychiatric/Behavioral: Negative for agitation and behavioral problems.        Personal History     Past Medical History:   Diagnosis Date   • Arthritis      Past Surgical History:   Procedure Laterality Date   • ABDOMINOPLASTY     • BREAST BIOPSY     • COLONOSCOPY N/A 2019    Procedure: COLONOSCOPY TO CECUM AND TI;  Surgeon: Cecilia Aviles MD;  Location: [facility] ENDOSCOPY;  Service: Gastroenterology   • EYE SURGERY Bilateral     eye lift   • MENISCECTOMY Right    • TOTAL HIP ARTHROPLASTY Bilateral      Family History   Problem Relation Age of Onset   • Aneurysm Mother         cerebral   • Lung cancer Father    • Coronary artery disease Brother    • Aortic aneurysm Brother      Social History     Tobacco Use   • Smoking status: Former Smoker     Quit date:      Years since quittin.8   • Smokeless tobacco: Never Used   Substance Use Topics   • Alcohol use: Yes     Alcohol/week: 7.0 standard drinks     Types: 7 Glasses of wine per week     Drinks per session: 1 or 2   • Drug use: No     No current facility-administered medications on file prior to encounter.      No current outpatient medications on file prior to encounter.     No Known Allergies    Objective    Objective     Vital Signs  Temp:  [97.3 °F (36.3 °C)-97.7 °F (36.5 °C)] 97.7 °F (36.5 °C)  Heart Rate:  [58-96] 58  Resp:  [18] 18  BP: (124-196)/() 124/72  SpO2:  [94 %-99 %] 99 %  on   ;   Device (Oxygen Therapy): room air  Body mass index is 31.15 kg/m².    Physical Exam  Vitals signs and nursing note reviewed.      Constitutional:       Appearance: Normal appearance.   Eyes:      Extraocular Movements: Extraocular movements intact.      Conjunctiva/sclera: Conjunctivae normal.   Neck:      Musculoskeletal: Normal range of motion and neck supple.   Cardiovascular:      Rate and Rhythm: Normal rate and regular rhythm.   Pulmonary:      Effort: Pulmonary effort is normal. No respiratory distress.      Breath sounds: Normal breath sounds.   Abdominal:      General: Bowel sounds are normal. There is no distension.      Palpations: Abdomen is soft.      Tenderness: There is no abdominal tenderness.   Musculoskeletal: Normal range of motion.         General: No swelling or tenderness.   Skin:     General: Skin is warm and dry.   Neurological:      General: No focal deficit present.      Mental Status: She is alert and oriented to person, place, and time. Mental status is at baseline.   Psychiatric:         Mood and Affect: Mood normal.         Behavior: Behavior normal.         Results Review:  I reviewed the patient's new clinical results.  I reviewed the patient's new imaging results and agree with the interpretation.  I reviewed the patient's other test results and agree with the interpretation  I personally viewed and interpreted the patient's EKG/Telemetry data  Discussed with ED provider.    Lab Results (last 24 hours)     Procedure Component Value Units Date/Time    CBC & Differential [[id number]]  (Abnormal) Collected: 10/12/20 2336    Specimen: Blood Updated: 10/12/20 2351    Narrative:      The following orders were created for panel order CBC & Differential.  Procedure                               Abnormality         Status                     ---------                               -----------         ------                     CBC Auto Differential[[id number]]        Abnormal            Final result                 Please view results for these tests on the individual orders.    Comprehensive Metabolic Panel [[id number]]   (Abnormal) Collected: 10/12/20 2336    Specimen: Blood Updated: 10/13/20 0017     Glucose 108 mg/dL      BUN 16 mg/dL      Creatinine 0.78 mg/dL      Sodium 142 mmol/L      Potassium 4.2 mmol/L      Chloride 106 mmol/L      CO2 25.3 mmol/L      Calcium 9.5 mg/dL      Total Protein 7.2 g/dL      Albumin 4.40 g/dL      ALT (SGPT) 17 U/L      AST (SGOT) 21 U/L      Alkaline Phosphatase 75 U/L      Total Bilirubin 0.4 mg/dL      eGFR Non African Amer 73 mL/min/1.73      Globulin 2.8 gm/dL      A/G Ratio 1.6 g/dL      BUN/Creatinine Ratio 20.5     Anion Gap 10.7 mmol/L     Narrative:      GFR Normal >60  Chronic Kidney Disease <60  Kidney Failure <15      Ethanol [[id number]] Collected: 10/12/20 2336    Specimen: Blood Updated: 10/13/20 0017     Ethanol <10 mg/dL      Ethanol % <0.010 %     CBC Auto Differential [[id number]]  (Abnormal) Collected: 10/12/20 2336    Specimen: Blood Updated: 10/12/20 2351     WBC 7.33 10*3/mm3      RBC 4.77 10*6/mm3      Hemoglobin 14.9 g/dL      Hematocrit 44.3 %      MCV 92.9 fL      MCH 31.2 pg      MCHC 33.6 g/dL      RDW 13.2 %      RDW-SD 44.7 fl      MPV 9.1 fL      Platelets 255 10*3/mm3      Neutrophil % 72.3 %      Lymphocyte % 18.4 %      Monocyte % 7.0 %      Eosinophil % 1.4 %      Basophil % 0.5 %      Immature Grans % 0.4 %      Neutrophils, Absolute 5.30 10*3/mm3      Lymphocytes, Absolute 1.35 10*3/mm3      Monocytes, Absolute 0.51 10*3/mm3      Eosinophils, Absolute 0.10 10*3/mm3      Basophils, Absolute 0.04 10*3/mm3      Immature Grans, Absolute 0.03 10*3/mm3      nRBC 0.0 /100 WBC     Urinalysis With Microscopic If Indicated (No Culture) - Urine, Clean Catch [[id number]]  (Abnormal) Collected: 10/13/20 0010    Specimen: Urine, Clean Catch Updated: 10/13/20 0021     Color, UA Yellow     Appearance, UA Clear     pH, UA 7.0     Specific Gravity, UA 1.008     Glucose, UA Negative     Ketones, UA Negative     Bilirubin, UA Negative     Blood, UA Negative     Protein, UA  Negative     Leuk Esterase, UA Moderate (2+)     Nitrite, UA Negative     Urobilinogen, UA 0.2 E.U./dL    Urine Drug Screen - Urine, Clean Catch [[id number]]  (Normal) Collected: 10/13/20 0010    Specimen: Urine, Clean Catch Updated: 10/13/20 0044     Amphet/Methamphet, Screen Negative     Barbiturates Screen, Urine Negative     Benzodiazepine Screen, Urine Negative     Cocaine Screen, Urine Negative     Opiate Screen Negative     THC, Screen, Urine Negative     Methadone Screen, Urine Negative     Oxycodone Screen, Urine Negative    Narrative:      Negative Thresholds For Drugs Screened:     Amphetamines               500 ng/ml   Barbiturates               200 ng/ml   Benzodiazepines            100 ng/ml   Cocaine                    300 ng/ml   Methadone                  300 ng/ml   Opiates                    300 ng/ml   Oxycodone                  100 ng/ml   THC                        50 ng/ml    The Normal Value for all drugs tested is negative. This report includes final unconfirmed screening results to be used for medical treatment purposes only. Unconfirmed results must not be used for non-medical purposes such as employment or legal testing. Clinical consideration should be applied to any drug of abuse test, particulary when unconfirmed results are used.    Urinalysis, Microscopic Only - Urine, Clean Catch [[id number]]  (Abnormal) Collected: 10/13/20 0010    Specimen: Urine, Clean Catch Updated: 10/13/20 0023     RBC, UA 0-2 /HPF      WBC, UA 6-12 /HPF      Bacteria, UA None Seen /HPF      Squamous Epithelial Cells, UA 0-2 /HPF      Hyaline Casts, UA 0-2 /LPF      Methodology Automated Microscopy    COVID PRE-OP / PRE-PROCEDURE SCREENING ORDER (NO ISOLATION) - Swab, Nasopharynx [[id number]] Collected: 10/13/20 0052    Specimen: Swab from Nasopharynx Updated: 10/13/20 0209    Narrative:      The following orders were created for panel order COVID PRE-OP / PRE-PROCEDURE SCREENING ORDER (NO ISOLATION) - Swab,  Nasopharynx.  Procedure                               Abnormality         Status                     ---------                               -----------         ------                     COVID-19,BIOTAP, NP/OP S...[[id number]]                      In process                   Please view results for these tests on the individual orders.    COVID-19,BIOTAP, NP/OP SWAB IN TRANSPORT MEDIA OR SALINE 24-36 HR TAT - Swab, Nasopharynx [[id number]] Collected: 10/13/20 0052    Specimen: Swab from Nasopharynx Updated: 10/13/20 0209    Basic Metabolic Panel [[id number]]  (Abnormal) Collected: 10/13/20 0534    Specimen: Blood Updated: 10/13/20 0644     Glucose 90 mg/dL      BUN 14 mg/dL      Creatinine 0.76 mg/dL      Sodium 138 mmol/L      Potassium 4.0 mmol/L      Comment: Slight hemolysis detected by analyzer. Results may be affected.        Chloride 107 mmol/L      CO2 22.5 mmol/L      Calcium 8.5 mg/dL      eGFR Non African Amer 75 mL/min/1.73      BUN/Creatinine Ratio 18.4     Anion Gap 8.5 mmol/L     Narrative:      GFR Normal >60  Chronic Kidney Disease <60  Kidney Failure <15      CBC (No Diff) [[id number]]  (Normal) Collected: 10/13/20 0534    Specimen: Blood Updated: 10/13/20 0607     WBC 5.86 10*3/mm3      RBC 4.42 10*6/mm3      Hemoglobin 13.7 g/dL      Hematocrit 41.5 %      MCV 93.9 fL      MCH 31.0 pg      MCHC 33.0 g/dL      RDW 13.0 %      RDW-SD 45.1 fl      MPV 9.4 fL      Platelets 240 10*3/mm3           Imaging Results (Last 24 Hours)     Procedure Component Value Units Date/Time    MRI Brain With & Without Contrast [[id number]] Collected: 10/13/20 0655     Updated: 10/13/20 0655    Narrative:      MRI BRAIN WITH AND WITHOUT CONTRAST     HISTORY:    Questionable subdural hemorrhage versus meningioma.     COMPARISON: CT head 10/12/2020.     TECHNIQUE: Multiplanar, multisequence MR imaging of the brain was  performed with and without intravenous contrast.     FINDINGS:     There is no restricted diffusion. A few  tiny areas of T2 hyperintensity  within the periventricular and subcortical white matter are within  normal limits for a patient of this age.  There is no midline shift,  hydrocephalus, parenchymal hemorrhage, or abnormal extra-axial fluid  collection. The major T2 intracranial arterial flow voids appear grossly  normal. Midline structures are unremarkable.     There is a T1 isointense, T2 hyperintense dural based lesion  demonstrating avid enhancement along the falx measuring approximately  0.7 x 0.8 cm and corresponding with the recent area of hyperdensity on  noncontrast CT. This area does not demonstrate significant  susceptibility effect.       Impression:         Findings of probable small meningioma along the posterior falx. No  findings of intracranial hemorrhage.     The above findings were discussed with Dr. Kelly by telephone by Ulises Russell at 2:30 AM on  10/13/2020  .       CT Head Without Contrast [[id number]] Collected: 10/13/20 0644     Updated: 10/13/20 0644    Narrative:      CT HEAD     HISTORY:Acute memory loss.     TECHNIQUE: CT scan of the head was obtained with 3 mm axial images  without intravenous contrast.  Radiation dose reduction techniques were  utilized, including automated exposure control and exposure modulation  based on body size.     COMPARISON:None.     FINDINGS:  There is a small hyperdense nodular focus along the falx and projecting  to the left over the high left parietal lobe measuring 0.4 x 0.8 cm. No  hydrocephalus is present.       Impression:      1.  Small focus of hyperdensity contiguous with the falx. While findings  are favored to be a meningioma, a small subdural hematoma would appear  similar. MRI with and without contrast is recommended to differentiate.     The above findings were discussed with Dr. Kelly by telephone by Ulises Russell at 11:50 PM on 10/12/2020   .                     No orders to display        Assessment/Plan     Active Hospital Problems     Diagnosis  POA   • **TGA (transient global amnesia) [G45.4]  Yes   • White coat syndrome without diagnosis of hypertension [R03.0]  Yes   • Other hyperlipidemia [E78.49]  Yes      Resolved Hospital Problems   No resolved problems to display.       · await neurology and SURJIT. CT head demonstrated small focus of hyperdensity contiguous with the falx thought to be a meningioma but could not exclude a small subdural hematoma. MRI confirmed a probable small meningioma, no findings of intracranial hemorrhage. Likely can just monitor.  · neuro checks   · BP elevated on admission but is now stable. patient reports being diagnosed with white coat syndrome.   · I discussed the patient's findings and my recommendations with patient and nursing staff.    VTE Prophylaxis - SCDs.  Code Status - Full code.       JULI Ramirez  [facility]  10/13/20  09:30 EDT

## 2020-10-13 NOTE — CONSULTS
Inpatient Neurosurgery Consult  Consult performed by: Emily Hudson APRN  Consult ordered by: Deanna Garcia APRN  Reason for consult: Meningioma        Patient Care Team:  Lisa Malone MD as PCP - General (Internal Medicine)    Chief complaint: Amnesia    Subjective     History of Present Illness     This is a 70-year-old female with a history of arthritis.  Hyperlipidemia.  She takes no medications at home other than a daily aspirin and statin.  The patient was driving home yesterday from work and had a abrupt onset of confusion.  The symptoms lasted approximately 1 hour.  She presented to the emergency room.  She stated that she was not having any headache or weakness on either side of the body but just cannot remember anything.  She seemed quite upset and asked questions repetitively according to a family member.  There is no history of seizures, syncopal episode.  No chest pain or shortness of breath.  Patient denies any recent fever.  She is not had no history of nausea or vomiting.  The patient lives at home with her  who has had previous traumatic brain injury requiring a craniotomy about 13 years ago.  Since that time he is started to develop signs of dementia for which he is currently undergoing work-up.the patient was involved in a heated discussion with her  and became quite upset. Following that episode she had called family member asking questions about things that the patient should have already known the answer to such as the death of a family member.    CT imaging of the brain performed in the emergency room revealed a small area of increased density along the falx on the left located in the high parietal lobe.  This measured 4 x 8 mm in size.  This finding seems most assistant with meningioma.  Neurosurgery has been asked given recommendations regarding this incidental finding.  Patient also had MRI of the brain with and without contrast.  Please see below for the  findings of those studies.    Review of Systems   Constitutional: Positive for fatigue. Negative for activity change, appetite change, chills and fever.   HENT: Negative.  Negative for sore throat and trouble swallowing.    Eyes: Negative.  Negative for photophobia and visual disturbance.   Respiratory: Negative for cough and shortness of breath.    Cardiovascular: Positive for chest pain. Negative for palpitations.   Gastrointestinal: Negative.  Negative for nausea and vomiting.   Endocrine: Negative.    Genitourinary: Negative.    Musculoskeletal: Negative.    Skin: Negative.    Allergic/Immunologic: Negative.    Neurological: Negative.  Negative for dizziness, seizures, speech difficulty, light-headedness, numbness and headaches.   Psychiatric/Behavioral: Positive for confusion.        Past Medical History:   Diagnosis Date   • Arthritis    ,   Past Surgical History:   Procedure Laterality Date   • ABDOMINOPLASTY     • BREAST BIOPSY     • COLONOSCOPY N/A 2019    Procedure: COLONOSCOPY TO CECUM AND TI;  Surgeon: Cecilia Aviles MD;  [facility]: [facility];  Service: Gastroenterology   • EYE SURGERY Bilateral     eye lift   • MENISCECTOMY Right    • TOTAL HIP ARTHROPLASTY Bilateral    ,   Family History   Problem Relation Age of Onset   • Aneurysm Mother         cerebral   • Lung cancer Father    • Coronary artery disease Brother    • Aortic aneurysm Brother    ,   Social History     Tobacco Use   • Smoking status: Former Smoker     Quit date:      Years since quittin.8   • Smokeless tobacco: Never Used   Substance Use Topics   • Alcohol use: Yes     Alcohol/week: 7.0 standard drinks     Types: 7 Glasses of wine per week     Drinks per session: 1 or 2   • Drug use: No     E-cigarette/Vaping   • E-cigarette/Vaping Use Never User      E-cigarette/Vaping Substances     E-cigarette/Vaping Devices       ,   No medications prior to admission.   , Scheduled Meds:  aspirin, 81 mg, Oral,  Daily  atorvastatin, 40 mg, Oral, Daily  sodium chloride, 10 mL, Intravenous, Q12H    , Continuous Infusions:   , PRN Meds:  •  acetaminophen **OR** acetaminophen **OR** acetaminophen  •  bisacodyl  •  calcium carbonate  •  nitroglycerin  •  ondansetron **OR** ondansetron  •  [COMPLETED] Insert peripheral IV **AND** sodium chloride  •  sodium chloride and Allergies:  Patient has no known allergies.    Objective      Vital Signs  Temp:  [97.3 °F (36.3 °C)-98 °F (36.7 °C)] 98 °F (36.7 °C)  Heart Rate:  [58-96] 74  Resp:  [18] 18  BP: (124-196)/() 143/79    Physical Exam  Vitals signs reviewed.   Constitutional:       General: She is not in acute distress.     Appearance: She is well-developed. She is not ill-appearing, toxic-appearing or diaphoretic.   HENT:      Head: Normocephalic and atraumatic.      Nose: Nose normal.      Mouth/Throat:      Mouth: Mucous membranes are moist.      Pharynx: No oropharyngeal exudate.   Eyes:      General:         Right eye: No discharge.         Left eye: No discharge.      Extraocular Movements: Extraocular movements intact.      Conjunctiva/sclera: Conjunctivae normal.      Pupils: Pupils are equal, round, and reactive to light.   Neck:      Musculoskeletal: Normal range of motion and neck supple.      Trachea: No tracheal deviation.   Cardiovascular:      Rate and Rhythm: Normal rate.   Pulmonary:      Effort: Pulmonary effort is normal. No respiratory distress.   Abdominal:      General: There is no distension.      Palpations: Abdomen is soft.      Tenderness: There is no abdominal tenderness.   Musculoskeletal: Normal range of motion.         General: No tenderness.      Right lower leg: No edema.      Left lower leg: No edema.   Skin:     General: Skin is warm and dry.      Findings: No erythema.   Neurological:      General: No focal deficit present.      Mental Status: She is alert and oriented to person, place, and time.      GCS: GCS eye subscore is 4. GCS verbal  subscore is 5. GCS motor subscore is 6.      Cranial Nerves: No cranial nerve deficit.      Sensory: No sensory deficit.      Motor: No weakness or abnormal muscle tone.      Coordination: Coordination normal.      Deep Tendon Reflexes: Reflexes are normal and symmetric. Reflexes normal.      Comments: AA&O x 3.  Speech is normal.  Converses appropriately.  PERRL. EOM's intact. Face symmetric. Tongue midline without fasiculations or atrophy. Sensation equal and intact throughout the face.  Hearing is intact bilaterally to finger rustle. Negative pronator drift.  No dysmetria.  No motor or sensory deficits. DTR's normal. Negative Ferguson's; negative clonus.    Psychiatric:         Mood and Affect: Mood normal.         Behavior: Behavior normal. Behavior is cooperative.         Thought Content: Thought content normal.       Results Review:    I reviewed the patient's new clinical results.  I reviewed the patient's new imaging results and agree with the interpretation.    CT HEAD WO CONTRAST    There is a small left falx hyperdense nodular focus over the high left parietal lobe measuring 4 x 8 mm in size. No evidence of mass effect or hydrocephalus. Finding is suspicious for meningioma.     MRI BRAIN WITH AND WITHOUT CONTRAST    The MRI confirms a dural based 7 x 8 mm lesion along the falx with no evidence of mass-effect.  There is also no evidence of hemorrhage.  The MRI also confirms the finding of meningioma.      Assessment/Plan       TGA (transient global amnesia)    Other hyperlipidemia    White coat syndrome without diagnosis of hypertension      Assessment & Plan     Incidental finding of meningioma      Nothing to do regarding this finding. It will just need to be followed. We will arrange f/u in our office as outpatient.     Discussed above with Dr. Briones.  The meningioma is incidental and very small.  There is no associated mass-effect and it has no bearing on the patient's amnesia.  The patient can be  seen in the office again in 6 months with a repeat MRI of the brain with and without contrast.    Neurosurgical standpoint, patient can be discharged anytime.      I discussed the patients findings and my recommendations with patient    Emily Hudson, JULI  10/13/20  13:31 EDT

## 2020-10-13 NOTE — PROGRESS NOTES
Discharge Planning Assessment  [facility]     Patient Name: Gina Gonzalez  MRN: [id number]  Today's Date: 10/13/2020    Admit Date: 10/12/2020    Discharge Needs Assessment     Row Name 10/13/20 8191       Living Environment    Lives With  spouse    Current Living Arrangements  home/apartment/condo    Primary Care Provided by  self    Provides Primary Care For  no one    Family Caregiver if Needed  spouse    Quality of Family Relationships  helpful;involved;supportive    Able to Return to Prior Arrangements  yes       Resource/Environmental Concerns    Resource/Environmental Concerns  none    Transportation Concerns  car, none       Transition Planning    Patient/Family Anticipates Transition to  home with family    Patient/Family Anticipated Services at Transition  none       Discharge Needs Assessment    Readmission Within the Last 30 Days  no previous admission in last 30 days    Equipment Currently Used at Home  none    Concerns to be Addressed  no discharge needs identified;denies needs/concerns at this time    Anticipated Changes Related to Illness  none    Equipment Needed After Discharge  none        Discharge Plan     Row Name 10/13/20 9473       Plan    Plan  Home with spouse- denies any additional dc needs    Provided Post Acute Provider List?  N/A    Patient/Family in Agreement with Plan  yes    Plan Comments  Spoke with pt at bedside, introduced self and explained CCP role. Verified facesheet and confirmed local pharmacy is [facility]. Pt lives at home with her spouse, few steps to enter from garage entrance. 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 Pt has been to [facility] after hip surgery. Pt denies any DME OR HH. Pt denies any dc needs or concerns and plan is home with spouse. cristopher loera/ccp        Continued Care and Services - Admitted Since 10/12/2020    Coordination has not been started for this encounter.         Demographic Summary     Row Name 10/13/20 8492        General Information    Admission Type  observation    Referral Source  admission list    Reason for Consult  discharge planning    Preferred Language  English     Used During This Interaction  no       Contact Information    Permission Granted to Share Info With  family/designee        Functional Status     Row Name 10/13/20 1625       Functional Status    Usual Activity Tolerance  good    Current Activity Tolerance  good       Functional Status, IADL    Medications  independent    Meal Preparation  independent    Housekeeping  independent    Shopping  independent       Mental Status    General Appearance WDL  WDL       Mental Status Summary    Recent Changes in Mental Status/Cognitive Functioning  no changes       Employment/    Employment Status  employed part-time        Psychosocial    No documentation.       Abuse/Neglect    No documentation.       Legal     Row Name 10/13/20 1625       Financial/Legal    Finance Comments  denies advance directive, working on one at home.        Substance Abuse    No documentation.       Patient Forms    No documentation.           Socorro Phillips RN

## 2020-10-13 NOTE — PLAN OF CARE
Goal Outcome Evaluation:  Plan of Care Reviewed With: patient  Progress: no change  Outcome Summary: pt admitted this am with actue memory loss/amnesia. Pt can not remember why she is in the hospital, how she got here, or what she has been doing since she got off work yesterday. Patient asks the same questions over and over again even just minutes after being told the answers. patient's long term appears intact. patient is pleasant and cooperative. will ctm this shift.

## 2020-10-14 ENCOUNTER — READMISSION MANAGEMENT (OUTPATIENT)
Dept: CALL CENTER | Facility: HOSPITAL | Age: 70
End: 2020-10-14

## 2020-10-14 VITALS
WEIGHT: 187.17 LBS | HEIGHT: 65 IN | DIASTOLIC BLOOD PRESSURE: 84 MMHG | OXYGEN SATURATION: 96 % | BODY MASS INDEX: 31.18 KG/M2 | RESPIRATION RATE: 16 BRPM | TEMPERATURE: 98.6 F | HEART RATE: 55 BPM | SYSTOLIC BLOOD PRESSURE: 139 MMHG

## 2020-10-14 DIAGNOSIS — G45.4 TGA (TRANSIENT GLOBAL AMNESIA): Primary | ICD-10-CM

## 2020-10-14 PROCEDURE — G0378 HOSPITAL OBSERVATION PER HR: HCPCS

## 2020-10-14 PROCEDURE — 25010000002 INFLUENZA VAC SPLIT QUAD 0.5 ML SUSPENSION PREFILLED SYRINGE: Performed by: INTERNAL MEDICINE

## 2020-10-14 PROCEDURE — 90686 IIV4 VACC NO PRSV 0.5 ML IM: CPT | Performed by: INTERNAL MEDICINE

## 2020-10-14 PROCEDURE — 99214 OFFICE O/P EST MOD 30 MIN: CPT | Performed by: PSYCHIATRY & NEUROLOGY

## 2020-10-14 PROCEDURE — G0008 ADMIN INFLUENZA VIRUS VAC: HCPCS | Performed by: INTERNAL MEDICINE

## 2020-10-14 RX ADMIN — Medication 1000 MCG: at 09:24

## 2020-10-14 RX ADMIN — ATORVASTATIN CALCIUM 40 MG: 20 TABLET, FILM COATED ORAL at 09:24

## 2020-10-14 RX ADMIN — ASPIRIN 81 MG: 81 TABLET, CHEWABLE ORAL at 09:24

## 2020-10-14 RX ADMIN — ACETAMINOPHEN 650 MG: 325 TABLET, FILM COATED ORAL at 09:24

## 2020-10-14 RX ADMIN — SODIUM CHLORIDE, PRESERVATIVE FREE 10 ML: 5 INJECTION INTRAVENOUS at 09:25

## 2020-10-14 RX ADMIN — INFLUENZA VIRUS VACCINE 0.5 ML: 15; 15; 15; 15 SUSPENSION INTRAMUSCULAR at 13:45

## 2020-10-14 NOTE — DISCHARGE SUMMARY
Patient Name: Gina Gonzalez  : 1950  MRN: [id number]    Date of Admission: 10/12/2020  Date of Discharge:  10/14/2020  Primary Care Physician: Lisa Malone MD      Chief Complaint:   Memory Loss (x1 hr)      Discharge Diagnoses     Active Hospital Problems    Diagnosis  POA   • **TGA (transient global amnesia) [G45.4]  Yes   • White coat syndrome without diagnosis of hypertension [R03.0]  Yes   • Other hyperlipidemia [E78.49]  Yes      Resolved Hospital Problems   No resolved problems to display.        Hospital Course     Ms. Gonzalez is a 70 y.o. female with no real significant medical history who [facility] to [facility] initially complaining of memory loss.  Please see the admitting history and physical for further details.  She was admitted to the hospital for further evaluation and treatment to rule out any neurological disease. Neurology evaluated the patient and thought her symptoms were more consistent with a psychiatric issue secondary to severe stress and anxiety. They recommend outpatient therapy with a psychiatrist who can better address her needs. Workup consisted of CT head EEG, MRI brain and CTA head and neck in which all were negative. Vitamin B12 was on the lower side of normal and I started her on oral supplementation. TSH slightly elevated and should be rechecked in 6 weeks with her PCP. Lipid panel also slightly elevated and we discussed lifestyle modification including diet and exercise. BP was elevated on admission but the patient reports her PCP recently diagnosed her with whitecoat syndrome, corrected on its own. Incidental finding on CT shows a very small meningioma that is of no concern at this time. Neurosurgery saw in consultation and recommend repeat imaging with MRI in 6 months. She will be discharged home today in stable condition and should follow up with her PCP in 1-2 weeks. She has been given outside resources for psychiatric follow up.      Day of  Discharge     no new complaints or events overnight. she is back to baseline and more than ready to go home.     denies chest pain, palpitations, SOA, edema, fever, chills, nausea and vomiting. Zero neurological complaints.     Physical Exam:  Temp:  [98 °F (36.7 °C)-98.8 °F (37.1 °C)] 98.6 °F (37 °C)  Heart Rate:  [55-74] 55  Resp:  [16-18] 16  BP: (126-156)/(76-99) 139/84  Body mass index is 31.15 kg/m².  Physical Exam  Vitals signs and nursing note reviewed.   Constitutional:       Appearance: Normal appearance.   HENT:      Head: Normocephalic and atraumatic.   Eyes:      Extraocular Movements: Extraocular movements intact.      Conjunctiva/sclera: Conjunctivae normal.   Neck:      Musculoskeletal: Normal range of motion and neck supple.   Cardiovascular:      Rate and Rhythm: Normal rate and regular rhythm.   Pulmonary:      Effort: Pulmonary effort is normal. No respiratory distress.   Abdominal:      General: Bowel sounds are normal. There is no distension.      Palpations: Abdomen is soft.      Tenderness: There is no abdominal tenderness.   Musculoskeletal: Normal range of motion.         General: No swelling.   Skin:     General: Skin is warm and dry.   Neurological:      General: No focal deficit present.      Mental Status: She is alert and oriented to person, place, and time. Mental status is at baseline.   Psychiatric:         Mood and Affect: Mood normal.         Behavior: Behavior normal.         Consultants     Consult Orders (all) (From admission, onward)     Start     Ordered    10/13/20 1101  Inpatient Access Center Consult  Once     Provider:  (Not yet assigned)    10/13/20 1100    10/13/20 0407  Inpatient Neurology Consult General  Once     Specialty:  Neurology  Provider:  Robbie Husain MD    10/13/20 0407              Procedures     Imaging Results (All)     Procedure Component Value Units Date/Time    MRI Brain With & Without Contrast [[id number]] Collected: 10/13/20 0655     Updated:  10/13/20 2049    Narrative:      MRI BRAIN WITH AND WITHOUT CONTRAST     HISTORY:    Questionable subdural hemorrhage versus meningioma.     COMPARISON: CT head 10/12/2020.     TECHNIQUE: Multiplanar, multisequence MR imaging of the brain was  performed with and without intravenous contrast.     FINDINGS:     There is no restricted diffusion. A few tiny areas of T2 hyperintensity  within the periventricular and subcortical white matter are within  normal limits for a patient of this age.  There is no midline shift,  hydrocephalus, parenchymal hemorrhage, or abnormal extra-axial fluid  collection. The major T2 intracranial arterial flow voids appear grossly  normal. Midline structures are unremarkable.     There is a T1 isointense, T2 hyperintense dural based lesion  demonstrating avid enhancement along the falx measuring approximately  0.7 x 0.8 cm and corresponding with the recent area of hyperdensity on  noncontrast CT. This area does not demonstrate significant  susceptibility effect.       Impression:         Findings of probable small meningioma along the posterior falx. No  findings of intracranial hemorrhage.     The above findings were discussed with Dr. Kelly by telephone by Ulises Russell at 2:30 AM on  10/13/2020  .     This report was finalized on 10/13/2020 8:46 PM by Dr. Ulises Russell M.D.       CT Head Without Contrast [[id number]] Collected: 10/13/20 0644     Updated: 10/13/20 2046    Narrative:      CT HEAD     HISTORY:Acute memory loss.     TECHNIQUE: CT scan of the head was obtained with 3 mm axial images  without intravenous contrast.  Radiation dose reduction techniques were  utilized, including automated exposure control and exposure modulation  based on body size.     COMPARISON:None.     FINDINGS:  There is a small hyperdense nodular focus along the falx and projecting  to the left over the high left parietal lobe measuring 0.4 x 0.8 cm. No  hydrocephalus is present.       Impression:       1.  Small focus of hyperdensity contiguous with the falx. While findings  are favored to be a meningioma, a small subdural hematoma would appear  similar. MRI with and without contrast is recommended to differentiate.     The above findings were discussed with Dr. Kelly by telephone by Ulises Russell at 11:50 PM on 10/12/2020   .     This report was finalized on 10/13/2020 8:43 PM by Dr. Ulises Russell M.D.       CT Angiogram Head [[id number]] Collected: 10/13/20 2008     Updated: 10/13/20 2012    Narrative:      CT ANGIOGRAPHY OF THE HEAD AND NECK WITHOUT AND WITH INTRAVENOUS  CONTRAST AND 3-D RECONSTRUCTIONS     HISTORY: Recent onset of confusion. Memory loss.     FINDINGS: CT angiography of the head and neck was performed without and  with intravenous contrast and 3-D reconstructions and demonstrates the  followin. An initial CT scan of the brain was performed without contrast. The  ventricles are normal in size and midline. There is no evidence of  intracranial hemorrhage or mass effect.  2. There is a very small meningioma along the left margin of the  posterior falx measuring 3 x 7 mm.  3. Evaluation for stenosis is based on NASCET criteria. The vertebral  arteries are patent with slight dominance of the left vertebral artery.  Both supply the basilar artery. There is fetal origin of the left  posterior cerebral artery.  4. The cervical carotid arteries show no significant stenosis or  irregularity.  5. The intracranial CT angiography shows no major branch stenosis. There  is no evidence of aneurysm.                 Radiation dose reduction techniques were utilized, including automated  exposure control and exposure modulation based on body size.     This report was finalized on 10/13/2020 8:09 PM by Dr. Jaciel Garcia M.D.       CT Angiogram Neck [[id number]] Collected: 10/13/20 2008     Updated: 10/13/20 2012    Narrative:      CT ANGIOGRAPHY OF THE HEAD AND NECK WITHOUT AND WITH INTRAVENOUS  CONTRAST  AND 3-D RECONSTRUCTIONS     HISTORY: Recent onset of confusion. Memory loss.     FINDINGS: CT angiography of the head and neck was performed without and  with intravenous contrast and 3-D reconstructions and demonstrates the  followin. An initial CT scan of the brain was performed without contrast. The  ventricles are normal in size and midline. There is no evidence of  intracranial hemorrhage or mass effect.  2. There is a very small meningioma along the left margin of the  posterior falx measuring 3 x 7 mm.  3. Evaluation for stenosis is based on NASCET criteria. The vertebral  arteries are patent with slight dominance of the left vertebral artery.  Both supply the basilar artery. There is fetal origin of the left  posterior cerebral artery.  4. The cervical carotid arteries show no significant stenosis or  irregularity.  5. The intracranial CT angiography shows no major branch stenosis. There  is no evidence of aneurysm.                 Radiation dose reduction techniques were utilized, including automated  exposure control and exposure modulation based on body size.     This report was finalized on 10/13/2020 8:09 PM by Dr. Jaciel Garcia M.D.             Pertinent Labs     Results from last 7 days   Lab Units 10/13/20  0534 10/12/20  2336   WBC 10*3/mm3 5.86 7.33   HEMOGLOBIN g/dL 13.7 14.9   PLATELETS 10*3/mm3 240 255     Results from last 7 days   Lab Units 10/13/20  0534 10/12/20  2336   SODIUM mmol/L 138 142   POTASSIUM mmol/L 4.0 4.2   CHLORIDE mmol/L 107 106   CO2 mmol/L 22.5 25.3   BUN mg/dL 14 16   CREATININE mg/dL 0.76 0.78   GLUCOSE mg/dL 90 108*   Estimated Creatinine Clearance: 71.5 mL/min (by C-G formula based on SCr of 0.76 mg/dL).  Results from last 7 days   Lab Units 10/12/20  2336   ALBUMIN g/dL 4.40   BILIRUBIN mg/dL 0.4   ALK PHOS U/L 75   AST (SGOT) U/L 21   ALT (SGPT) U/L 17     Results from last 7 days   Lab Units 10/13/20  0534 10/12/20  2336   CALCIUM mg/dL 8.5* 9.5   ALBUMIN g/dL   --  4.40           Results from last 7 days   Lab Units 10/13/20  1007   CHOLESTEROL mg/dL 206*   TRIGLYCERIDES mg/dL 91   HDL CHOL mg/dL 74*   LDL CHOL mg/dL 116*           Test Results Pending at Discharge       Discharge Details        Discharge Medications      New Medications      Instructions Start Date   cyanocobalamin 1000 MCG tablet  Commonly known as: VITAMIN B-12   1,000 mcg, Oral, Daily   Start Date: October 15, 2020            No Known Allergies      Discharge Disposition:  Home or Self Care    Discharge Diet:  Diet Order   Procedures   • Diet Regular       Discharge Activity:   Activity Instructions     Activity as Tolerated            CODE STATUS:    Code Status and Medical Interventions:   Ordered at: 10/13/20 0033     Code Status:    CPR     Medical Interventions (Level of Support Prior to Arrest):    Full       No future appointments.  Additional Instructions for the Follow-ups that You Need to Schedule     Discharge Follow-up with PCP   As directed       Currently Documented PCP:    Lisa Malone MD    PCP Phone Number:    [phone]     Follow Up Details: 1-2 weeks           Follow-up Information     Lisa Malone MD .    Specialty: Internal Medicine  Why: 1-2 weeks  Contact information:  [address] [place] [address]  [phone]                   Additional Instructions for the Follow-ups that You Need to Schedule     Discharge Follow-up with PCP   As directed       Currently Documented PCP:    Lisa Malone MD    PCP Phone Number:    [phone]     Follow Up Details: 1-2 weeks           Time Spent on Discharge:  Greater than 30 minutes      JULI Ramirez  [facility]  10/14/20  11:56 EDT

## 2020-10-14 NOTE — PROGRESS NOTES
"DOS: 10/14/2020  NAME: Gina Gonzalez   : 1950  PCP: Lisa Malone MD  Chief Complaint   Patient presents with   • Memory Loss     x1 hr         Subjective:   -Stable overnight, no issue to address by the caring nurse.  -No further spells during her hospital course.  -CTA head and neck and EEG has been done on her.    Vital signs: /84 (BP Location: Right arm, Patient Position: Lying)   Pulse 55   Temp 98.6 °F (37 °C) (Oral)   Resp 16   Ht 165.1 cm (65\")   Wt 84.9 kg (187 lb 2.7 oz)   SpO2 96%   BMI 31.15 kg/m²      Gen: Comfortable, lying in bed , not in pain or distress    MS: Alert, oriented, fluent, cooperative, and attentive.  She has good grasp of both recent and remote memory.  CN: Grossly intact from 2-12.  Vision: Normal on both eyes.    Pupils:: Size, equal, rounded and reactive to light.  Motor: Age-appropriate in terms of muscle bulk, tone, power, and deep tendon reflexes.  Sensory: Intact allover in terms of touch, temperature and vibration sensation.  Coordination: Age-appropriate on both upper and lower extremities.  Gait: Needs to stand up and walk without any hesitancy, weakness, limping poor and balance.          Laboratory results:  Lab Results   Component Value Date    GLUCOSE 90 10/13/2020    CALCIUM 8.5 (L) 10/13/2020     10/13/2020    K 4.0 10/13/2020    CO2 22.5 10/13/2020     10/13/2020    BUN 14 10/13/2020    CREATININE 0.76 10/13/2020    EGFRIFAFRI 72 2020    EGFRIFNONA 75 10/13/2020    BCR 18.4 10/13/2020    ANIONGAP 8.5 10/13/2020     Lab Results   Component Value Date    WBC 5.86 10/13/2020    HGB 13.7 10/13/2020    HCT 41.5 10/13/2020    MCV 93.9 10/13/2020     10/13/2020     Lab Results   Component Value Date    CHOL 206 (H) 10/13/2020     Lab Results   Component Value Date    HDL 74 (H) 10/13/2020    HDL 94 (H) 2020    HDL 98 (H) 2020     Lab Results   Component Value Date     (H) 10/13/2020     (H) 2020    "  (H) 02/14/2020     Lab Results   Component Value Date    TRIG 91 10/13/2020    TRIG 81 05/20/2020    TRIG 96 02/14/2020     @[email or web address]@     Review of labs: EEG did not show any significant abnormality.    Review and interpretation of imaging: CTA head and neck did not show any major blood vessel occlusion or critical stenosis.      Assessment:  1.  Transient spell of memory loss, recovered spontaneously without any other neurological manifestation or deficit.  Most likely to be secondary to stress and none organic in origin.    2.  CTA head and neck did not show any major blood vessel occlusion or critical stenosis  3.  Normal EEG    Plan:  1.  Continue with the current medication and care.  2.  Patient may benefit from psych consult to relieve stress and anxiety.  3.  DC neuro service, call with a question or concern.

## 2020-10-14 NOTE — OUTREACH NOTE
Prep Survey      Responses   [facility] patient discharged from?  [place]   Is LACE score < 7 ?  Yes   Eligibility  [facility]   Date of Admission  10/12/20   Date of Discharge  10/14/20   Discharge Disposition  Home or Self Care   Discharge diagnosis  transient global amnesia  more consistent with a psychiatric issue secondary to severe stress and anxiety.   Does the patient have one of the following disease processes/diagnoses(primary or secondary)?  Other   Does the patient have Home health ordered?  No   Is there a DME ordered?  No   Prep survey completed?  Yes          Gina Nation RN

## 2020-10-14 NOTE — PLAN OF CARE
Goal Outcome Evaluation:  Plan of Care Reviewed With: patient  Progress: improving  Outcome Summary: NAD NOTED. VSS. A/O X4. MMM. RR E/U. SKIN PWD. RESTED WELL.

## 2020-10-15 ENCOUNTER — TELEPHONE (OUTPATIENT)
Dept: INTERNAL MEDICINE | Facility: CLINIC | Age: 70
End: 2020-10-15

## 2020-10-15 ENCOUNTER — TRANSITIONAL CARE MANAGEMENT TELEPHONE ENCOUNTER (OUTPATIENT)
Dept: CALL CENTER | Facility: HOSPITAL | Age: 70
End: 2020-10-15

## 2020-10-15 NOTE — PROGRESS NOTES
Case Management Discharge Note      Final Note: home no additional dc orders noted. Jd RN/CCP    Provided Post Acute Provider List?: N/A    Selected Continued Care - Discharged on 10/14/2020 Admission date: 10/12/2020 - Discharge disposition: Home or Self Care    Destination    No services have been selected for the patient.              Durable Medical Equipment    No services have been selected for the patient.              Dialysis/Infusion    No services have been selected for the patient.              Home Medical Care    No services have been selected for the patient.              Therapy    No services have been selected for the patient.              Community Resources    No services have been selected for the patient.                  Transportation Services  Private: Car    Final Discharge Disposition Code: 01 - home or self-care

## 2020-10-15 NOTE — TELEPHONE ENCOUNTER
Caller: Gina Gonzalez    Relationship to patient: Self    Best call back number: [phone]  New or established patient?  [x] New  [] Established    Date of discharge: 10/14/2020    Facility discharged from: [facility]  Diagnosis/Symptoms: GLOBAL AMNESIA  Length of stay (If applicable): 2 DAYS

## 2020-10-15 NOTE — OUTREACH NOTE
Call Center TCM Note      Responses   [facility] patient discharged from?  [place]   Does the patient have one of the following disease processes/diagnoses(primary or secondary)?  Other   TCM attempt successful?  No   Unsuccessful attempts  Attempt 1          Madelaine Ugarte RN    10/15/2020, 15:21 EDT

## 2020-10-15 NOTE — OUTREACH NOTE
Call Center TCM Note      Responses   [facility] patient discharged from?  [place]   Does the patient have one of the following disease processes/diagnoses(primary or secondary)?  Other   TCM attempt successful?  No   Unsuccessful attempts  Attempt 2          Madelaine Ugarte RN    10/15/2020, 15:29 EDT

## 2020-10-16 ENCOUNTER — TRANSITIONAL CARE MANAGEMENT TELEPHONE ENCOUNTER (OUTPATIENT)
Dept: CALL CENTER | Facility: HOSPITAL | Age: 70
End: 2020-10-16

## 2020-10-16 NOTE — OUTREACH NOTE
Call Center TCM Note      Responses   [facility] patient discharged from?  [place]   Does the patient have one of the following disease processes/diagnoses(primary or secondary)?  Other   TCM attempt successful?  No   Unsuccessful attempts  Attempt 3          Madelaine Ugarte RN    10/16/2020, 10:59 EDT

## 2020-10-20 ENCOUNTER — TELEPHONE (OUTPATIENT)
Dept: NEUROSURGERY | Facility: CLINIC | Age: 70
End: 2020-10-20

## 2020-10-20 ENCOUNTER — OFFICE VISIT (OUTPATIENT)
Dept: INTERNAL MEDICINE | Facility: CLINIC | Age: 70
End: 2020-10-20

## 2020-10-20 VITALS
HEIGHT: 65 IN | SYSTOLIC BLOOD PRESSURE: 160 MMHG | BODY MASS INDEX: 30.99 KG/M2 | WEIGHT: 186 LBS | OXYGEN SATURATION: 99 % | HEART RATE: 65 BPM | DIASTOLIC BLOOD PRESSURE: 100 MMHG

## 2020-10-20 DIAGNOSIS — F43.9 STRESS AT HOME: ICD-10-CM

## 2020-10-20 DIAGNOSIS — G45.4 TGA (TRANSIENT GLOBAL AMNESIA): Primary | ICD-10-CM

## 2020-10-20 DIAGNOSIS — R03.0 WHITE COAT SYNDROME WITHOUT DIAGNOSIS OF HYPERTENSION: ICD-10-CM

## 2020-10-20 PROBLEM — D32.9 MENINGIOMA (HCC): Status: ACTIVE | Noted: 2020-10-20

## 2020-10-20 PROCEDURE — 99214 OFFICE O/P EST MOD 30 MIN: CPT | Performed by: INTERNAL MEDICINE

## 2020-10-20 RX ORDER — ESCITALOPRAM OXALATE 10 MG/1
10 TABLET ORAL DAILY
Qty: 30 TABLET | Refills: 5 | Status: SHIPPED | OUTPATIENT
Start: 2020-10-20 | End: 2021-04-23

## 2020-10-20 NOTE — PATIENT INSTRUCTIONS
Dean Guthrie, Ph.D.  [facility] [address]  [address] [place] [address]  [phone]    Anali Chappell, Ph.D.  [address] [facility] [place] [phone]    Yolette Urban, Ph. D.   [address] [facility] [address]  [phone]    [facility]  [address] [place] [address]  [phone]    Donaldo Barbosa, Ph.D.  [address] [place] [address]  [phone]    Cate Mariano, Ph.D.  [address] [place] [address]  [phone]    Jhon Garzon, Psy.D.  [address] [place] [address] [address]  [place] [address]  [phone]    Dr.Elizabeth Hanks  [phone]    William Brown, Psy. D.  [address]   [place] [address]  [phone]    Mickey Stewart, Psy.D. (For children and Family)  [address] [facility]  [facility], [address]  [phone]    Vika Leos Psy.D.   [address], [address]  [phone]    Gina Rodriguez, Psy.D.  [address] [facility] [address]  [place] [address]  [phone]    Shaneka Kiser  [address] [facility] [address]   [phone]    Stephanie Lui, Ph.D.   [address] [place] [address]  [phone]  For patients with history of brain injury, stoke, spinal cord, parkinson’s, etc…where the focus of treatment pertains to their medical diagnosis. Dr.Libby Gibson also has a great deal of experience working with chronic pain patients.     Almaz Kiser  Licensed Psychologist  [facility]  [address] [place] [address]  [phone]

## 2020-10-20 NOTE — PROGRESS NOTES
"Subjective     Gina Gonzalez is a 70 y.o. female who presents with   Chief Complaint   Patient presents with   • Hypertension     Hospital follow up       History of Present Illness     Patient presents in hospital f/u.  She was admitted for TGA.  I have reviewed discharge summary, labs, scans, cardiovascular studies and medication changes.  It was thought to be secondary to severe stress.  Her  possibly has dementia.  She is having difficulty coping with his behavior and the poor functioning of his business.  Stress is severe, intermittent for years and mainly situational at home only.  Her work is great.  It impacts her blood pressure.  No CP, SOA, palpations are associated.        Review of Systems   Constitutional: Negative for fever.   Respiratory: Negative.    Cardiovascular: Negative.        The following portions of the patient's history were reviewed and updated as appropriate: allergies, current medications and problem list.    Patient Active Problem List    Diagnosis Date Noted   • Meningioma (CMS/HCC) 10/20/2020   • TGA (transient global amnesia) 10/13/2020   • White coat syndrome without diagnosis of hypertension 02/19/2020   • Colon cancer screening 03/20/2019     Note Last Updated: 3/20/2019     Added automatically from request for surgery [id number]     • Other hyperlipidemia 02/22/2019   • Abnormal TSH 02/12/2019     Note Last Updated: 2/12/2019     By history     • Primary osteoarthritis of both hips 02/11/2019       Current Outpatient Medications on File Prior to Visit   Medication Sig Dispense Refill   • vitamin B-12 (VITAMIN B-12) 1000 MCG tablet Take 1 tablet by mouth Daily.       No current facility-administered medications on file prior to visit.        Objective     /100   Pulse 65   Ht 165.1 cm (65\")   Wt 84.4 kg (186 lb)   SpO2 99%   BMI 30.95 kg/m²     Physical Exam  Constitutional:       Appearance: She is well-developed.   HENT:      Head: Normocephalic and atraumatic. "   Pulmonary:      Effort: Pulmonary effort is normal.   Neurological:      Mental Status: She is alert and oriented to person, place, and time.   Psychiatric:         Behavior: Behavior normal.         Assessment/Plan   Diagnoses and all orders for this visit:    1. TGA (transient global amnesia) (Primary)    2. Stress at home    3. White coat syndrome without diagnosis of hypertension    Other orders  -     escitalopram (Lexapro) 10 MG tablet; Take 1 tablet by mouth Daily.  Dispense: 30 tablet; Refill: 5        Discussion    TGA f/u.  Likely stress induced after full work up.  Trial of Lexparo.  Discussed with the patient onset on action and the potential side effects including nausea.  The patient will let me know of any side effects from the medication.      White coat HTN.  Continue to monitor at home.        Current outpatient and discharge medications have been reconciled for the patient.  Reviewed by: Lisa Malone MD         Future Appointments   Date Time Provider Department Center   12/2/2020 10:45 AM Lisa Malone MD [facility] None   3/17/2021  8:30 AM Alexander Briones MD [facility]

## 2020-10-20 NOTE — TELEPHONE ENCOUNTER
Left vm. Patient has been scheduled for a follow up with Dr. Briones 3/17/21 at 830 am. She will need to have a MRI prior to appointment visit. Scheduling will call her with that information closer to appointment time.     Appointment reminders has been mailed.

## 2020-12-02 ENCOUNTER — OFFICE VISIT (OUTPATIENT)
Dept: INTERNAL MEDICINE | Facility: CLINIC | Age: 70
End: 2020-12-02

## 2020-12-02 VITALS
HEIGHT: 65 IN | DIASTOLIC BLOOD PRESSURE: 100 MMHG | BODY MASS INDEX: 30.99 KG/M2 | SYSTOLIC BLOOD PRESSURE: 158 MMHG | OXYGEN SATURATION: 98 % | WEIGHT: 186 LBS | HEART RATE: 67 BPM

## 2020-12-02 DIAGNOSIS — I10 ESSENTIAL HYPERTENSION: ICD-10-CM

## 2020-12-02 DIAGNOSIS — F41.9 ANXIETY: Primary | ICD-10-CM

## 2020-12-02 PROBLEM — Z12.11 COLON CANCER SCREENING: Status: RESOLVED | Noted: 2019-03-20 | Resolved: 2020-12-02

## 2020-12-02 PROCEDURE — 99214 OFFICE O/P EST MOD 30 MIN: CPT | Performed by: INTERNAL MEDICINE

## 2020-12-02 RX ORDER — LISINOPRIL 10 MG/1
10 TABLET ORAL DAILY
Qty: 30 TABLET | Refills: 5 | Status: SHIPPED | OUTPATIENT
Start: 2020-12-02 | End: 2021-04-23

## 2020-12-02 NOTE — PROGRESS NOTES
"Subjective     Gina Gonzalez is a 70 y.o. female who presents with   Chief Complaint   Patient presents with   • Anxiety   • Hypertension       History of Present Illness     Anxiety.  She is improved on Lexapro.  No side effects.  Severe persistent stress with  and finances a stressors for months.   HTN.  Poor control.  Typically she has been white coat only but not elevated at home as well.      Review of Systems   Constitutional: Negative for fever.   Respiratory: Negative.    Cardiovascular: Negative.        The following portions of the patient's history were reviewed and updated as appropriate: allergies, current medications and problem list.    Patient Active Problem List    Diagnosis Date Noted   • Anxiety 12/02/2020   • Meningioma (CMS/HCC) 10/20/2020   • TGA (transient global amnesia) 10/13/2020   • White coat syndrome without diagnosis of hypertension 02/19/2020   • Other hyperlipidemia 02/22/2019   • Abnormal TSH 02/12/2019     Note Last Updated: 2/12/2019     By history     • Primary osteoarthritis of both hips 02/11/2019       Current Outpatient Medications on File Prior to Visit   Medication Sig Dispense Refill   • escitalopram (Lexapro) 10 MG tablet Take 1 tablet by mouth Daily. 30 tablet 5   • vitamin B-12 (VITAMIN B-12) 1000 MCG tablet Take 1 tablet by mouth Daily.       No current facility-administered medications on file prior to visit.        Objective     /100   Pulse 67   Ht 165.1 cm (65\")   Wt 84.4 kg (186 lb)   SpO2 98%   BMI 30.95 kg/m²     Physical Exam  Constitutional:       Appearance: She is well-developed.   HENT:      Head: Normocephalic and atraumatic.   Pulmonary:      Effort: Pulmonary effort is normal.   Neurological:      Mental Status: She is alert and oriented to person, place, and time.   Psychiatric:         Behavior: Behavior normal.         Assessment/Plan   Diagnoses and all orders for this visit:    1. Anxiety (Primary)    2. Essential " hypertension    Other orders  -     lisinopril (PRINIVIL,ZESTRIL) 10 MG tablet; Take 1 tablet by mouth Daily.  Dispense: 30 tablet; Refill: 5        Discussion  Anxiety.  The patient will continue current regimen.      HTN.  Add lisinopril.  Discussed with the patient onset on action and the potential side effects including cough.  The patient will let me know of any side effects from the medication.             Future Appointments   Date Time Provider Department Center   2/3/2021  9:50 AM [facility]   2/10/2021  2:15 PM Lisa Malone MD [facility]   2/24/2021  9:45 AM [facility]   3/17/2021  8:30 AM Alexander Briones MD [facility]

## 2021-02-03 DIAGNOSIS — Z00.00 WELL ADULT EXAM: Primary | ICD-10-CM

## 2021-02-03 DIAGNOSIS — F41.9 ANXIETY: ICD-10-CM

## 2021-02-03 DIAGNOSIS — E78.49 OTHER HYPERLIPIDEMIA: ICD-10-CM

## 2021-02-03 DIAGNOSIS — R79.89 ABNORMAL TSH: ICD-10-CM

## 2021-02-05 LAB
25(OH)D3+25(OH)D2 SERPL-MCNC: 25 NG/ML (ref 30–100)
ALBUMIN SERPL-MCNC: 4.1 G/DL (ref 3.5–5.2)
ALBUMIN/GLOB SERPL: 1.9 G/DL
ALP SERPL-CCNC: 62 U/L (ref 39–117)
ALT SERPL-CCNC: 15 U/L (ref 1–33)
APPEARANCE UR: CLEAR
AST SERPL-CCNC: 20 U/L (ref 1–32)
BACTERIA #/AREA URNS HPF: NORMAL /HPF
BACTERIA UR CULT: NORMAL
BACTERIA UR CULT: NORMAL
BASOPHILS # BLD AUTO: 0.04 10*3/MM3 (ref 0–0.2)
BASOPHILS NFR BLD AUTO: 0.8 % (ref 0–1.5)
BILIRUB SERPL-MCNC: 0.5 MG/DL (ref 0–1.2)
BILIRUB UR QL STRIP: NEGATIVE
BUN SERPL-MCNC: 20 MG/DL (ref 8–23)
BUN/CREAT SERPL: 25.3 (ref 7–25)
CALCIUM SERPL-MCNC: 9.2 MG/DL (ref 8.6–10.5)
CHLORIDE SERPL-SCNC: 104 MMOL/L (ref 98–107)
CHOLEST SERPL-MCNC: 247 MG/DL (ref 0–200)
CO2 SERPL-SCNC: 29.7 MMOL/L (ref 22–29)
COLOR UR: YELLOW
CREAT SERPL-MCNC: 0.79 MG/DL (ref 0.57–1)
EOSINOPHIL # BLD AUTO: 0.17 10*3/MM3 (ref 0–0.4)
EOSINOPHIL NFR BLD AUTO: 3.3 % (ref 0.3–6.2)
EPI CELLS #/AREA URNS HPF: NORMAL /HPF (ref 0–10)
ERYTHROCYTE [DISTWIDTH] IN BLOOD BY AUTOMATED COUNT: 13.4 % (ref 12.3–15.4)
GLOBULIN SER CALC-MCNC: 2.2 GM/DL
GLUCOSE SERPL-MCNC: 86 MG/DL (ref 65–99)
GLUCOSE UR QL: NEGATIVE
HCT VFR BLD AUTO: 43.6 % (ref 34–46.6)
HDLC SERPL-MCNC: 86 MG/DL (ref 40–60)
HGB BLD-MCNC: 14.5 G/DL (ref 12–15.9)
HGB UR QL STRIP: NEGATIVE
IMM GRANULOCYTES # BLD AUTO: 0.04 10*3/MM3 (ref 0–0.05)
IMM GRANULOCYTES NFR BLD AUTO: 0.8 % (ref 0–0.5)
KETONES UR QL STRIP: NEGATIVE
LDLC SERPL CALC-MCNC: 149 MG/DL (ref 0–100)
LEUKOCYTE ESTERASE UR QL STRIP: ABNORMAL
LYMPHOCYTES # BLD AUTO: 1.83 10*3/MM3 (ref 0.7–3.1)
LYMPHOCYTES NFR BLD AUTO: 35.4 % (ref 19.6–45.3)
MCH RBC QN AUTO: 31.7 PG (ref 26.6–33)
MCHC RBC AUTO-ENTMCNC: 33.3 G/DL (ref 31.5–35.7)
MCV RBC AUTO: 95.2 FL (ref 79–97)
MICRO URNS: ABNORMAL
MONOCYTES # BLD AUTO: 0.53 10*3/MM3 (ref 0.1–0.9)
MONOCYTES NFR BLD AUTO: 10.3 % (ref 5–12)
MUCOUS THREADS URNS QL MICRO: PRESENT /HPF
NEUTROPHILS # BLD AUTO: 2.56 10*3/MM3 (ref 1.7–7)
NEUTROPHILS NFR BLD AUTO: 49.4 % (ref 42.7–76)
NITRITE UR QL STRIP: NEGATIVE
NRBC BLD AUTO-RTO: 0.2 /100 WBC (ref 0–0.2)
PH UR STRIP: 7 [PH] (ref 5–7.5)
PLATELET # BLD AUTO: 246 10*3/MM3 (ref 140–450)
POTASSIUM SERPL-SCNC: 4.6 MMOL/L (ref 3.5–5.2)
PROT SERPL-MCNC: 6.3 G/DL (ref 6–8.5)
PROT UR QL STRIP: NEGATIVE
RBC # BLD AUTO: 4.58 10*6/MM3 (ref 3.77–5.28)
RBC #/AREA URNS HPF: NORMAL /HPF (ref 0–2)
SODIUM SERPL-SCNC: 139 MMOL/L (ref 136–145)
SP GR UR: 1.01 (ref 1–1.03)
TRIGL SERPL-MCNC: 72 MG/DL (ref 0–150)
TSH SERPL DL<=0.005 MIU/L-ACNC: 3.48 UIU/ML (ref 0.27–4.2)
URINALYSIS REFLEX: ABNORMAL
UROBILINOGEN UR STRIP-MCNC: 0.2 MG/DL (ref 0.2–1)
VLDLC SERPL CALC-MCNC: 12 MG/DL (ref 5–40)
WBC # BLD AUTO: 5.17 10*3/MM3 (ref 3.4–10.8)
WBC #/AREA URNS HPF: NORMAL /HPF (ref 0–5)

## 2021-02-08 ENCOUNTER — TRANSCRIBE ORDERS (OUTPATIENT)
Dept: INTERNAL MEDICINE | Facility: CLINIC | Age: 71
End: 2021-02-08

## 2021-02-08 DIAGNOSIS — Z12.31 VISIT FOR SCREENING MAMMOGRAM: Primary | ICD-10-CM

## 2021-02-10 ENCOUNTER — OFFICE VISIT (OUTPATIENT)
Dept: INTERNAL MEDICINE | Facility: CLINIC | Age: 71
End: 2021-02-10

## 2021-02-10 VITALS
SYSTOLIC BLOOD PRESSURE: 134 MMHG | OXYGEN SATURATION: 98 % | HEART RATE: 62 BPM | BODY MASS INDEX: 31.16 KG/M2 | HEIGHT: 65 IN | WEIGHT: 187 LBS | DIASTOLIC BLOOD PRESSURE: 84 MMHG

## 2021-02-10 DIAGNOSIS — E78.49 OTHER HYPERLIPIDEMIA: ICD-10-CM

## 2021-02-10 DIAGNOSIS — F41.9 ANXIETY: ICD-10-CM

## 2021-02-10 DIAGNOSIS — Z00.00 MEDICARE ANNUAL WELLNESS VISIT, SUBSEQUENT: Primary | ICD-10-CM

## 2021-02-10 DIAGNOSIS — I10 ESSENTIAL HYPERTENSION: ICD-10-CM

## 2021-02-10 DIAGNOSIS — Z00.00 WELL ADULT EXAM: ICD-10-CM

## 2021-02-10 DIAGNOSIS — D32.9 MENINGIOMA (HCC): ICD-10-CM

## 2021-02-10 DIAGNOSIS — Z78.0 MENOPAUSE: ICD-10-CM

## 2021-02-10 PROBLEM — G45.4 TGA (TRANSIENT GLOBAL AMNESIA): Status: ACTIVE | Noted: 2020-10-13

## 2021-02-10 PROBLEM — R79.89 ABNORMAL TSH: Status: RESOLVED | Noted: 2019-02-12 | Resolved: 2021-02-10

## 2021-02-10 PROBLEM — G45.4 TGA (TRANSIENT GLOBAL AMNESIA): Status: RESOLVED | Noted: 2020-10-13 | Resolved: 2021-02-10

## 2021-02-10 PROCEDURE — 96160 PT-FOCUSED HLTH RISK ASSMT: CPT | Performed by: INTERNAL MEDICINE

## 2021-02-10 PROCEDURE — 1126F AMNT PAIN NOTED NONE PRSNT: CPT | Performed by: INTERNAL MEDICINE

## 2021-02-10 PROCEDURE — G0439 PPPS, SUBSEQ VISIT: HCPCS | Performed by: INTERNAL MEDICINE

## 2021-02-10 PROCEDURE — 1170F FXNL STATUS ASSESSED: CPT | Performed by: INTERNAL MEDICINE

## 2021-02-10 PROCEDURE — 1159F MED LIST DOCD IN RCRD: CPT | Performed by: INTERNAL MEDICINE

## 2021-02-10 PROCEDURE — 99397 PER PM REEVAL EST PAT 65+ YR: CPT | Performed by: INTERNAL MEDICINE

## 2021-02-10 RX ORDER — ATORVASTATIN CALCIUM 20 MG/1
20 TABLET, FILM COATED ORAL DAILY
Qty: 90 TABLET | Refills: 3 | Status: SHIPPED | OUTPATIENT
Start: 2021-02-10 | End: 2022-03-08 | Stop reason: SDUPTHER

## 2021-02-10 NOTE — PROGRESS NOTES
The ABCs of the Annual Wellness Visit  Subsequent Medicare Wellness Visit    Chief Complaint   Patient presents with   • Medicare Wellness-subsequent   • Annual Exam       Subjective   History of Present Illness:  Gina Gonzalez is a 70 y.o. female who presents for a Subsequent Medicare Wellness Visit.    The following data was reviewed by: Lisa Malone MD on 02/10/2021:  Common labs    Common Labsle 10/12/20 10/12/20 10/13/20 10/13/20 10/13/20 10/13/20 2/3/21 2/3/21 2/3/21    2336 2336 0534 0534 0534 1007 1005 1005 1005   Glucose  108 (A)  90        Glucose        86    BUN  16  14    20    Creatinine  0.78  0.76    0.79    eGFR Non  Am  73  75    72    eGFR African Am        87    Sodium  142  138    139    Potassium  4.2  4.0    4.6    Chloride  106  107    104    Calcium  9.5  8.5 (A)    9.2    Total Protein        6.3    Albumin  4.40      4.10    Total Bilirubin  0.4      0.5    Alkaline Phosphatase  75      62    AST (SGOT)  21      20    ALT (SGPT)  17      15    WBC 7.33  5.86    5.17     Hemoglobin 14.9  13.7    14.5     Hematocrit 44.3  41.5    43.6     Platelets 255  240    246     Total Cholesterol      206 (A)      Total Cholesterol         247 (A)   Triglycerides      91   72   HDL Cholesterol      74 (A)   86 (A)   LDL Cholesterol       116 (A)   149 (A)   Hemoglobin A1C     5.22       (A) Abnormal value       Comments are available for some flowsheets but are not being displayed.           Htn. Good control. HLD. Cholesterol is elevated.  Discussed starting statin.  Anxiety.  Good control with lexapro.     She sees neurosurgery to follow meningioma.  She has no symptoms.  No further episodes of TGA.      HEALTH RISK ASSESSMENT    Recent Hospitalizations:  No hospitalization(s) within the last year.    Current Medical Providers:  Patient Care Team:  Lisa Malone MD as PCP - General (Internal Medicine)    Smoking Status:  Social History     Tobacco Use   Smoking Status Former Smoker   •  Quit date:    • Years since quittin.1   Smokeless Tobacco Never Used       Alcohol Consumption:  Social History     Substance and Sexual Activity   Alcohol Use Yes   • Alcohol/week: 7.0 standard drinks   • Types: 7 Glasses of wine per week   • Drinks per session: 1 or 2       Depression Screen:   PHQ-2/PHQ-9 Depression Screening 2/10/2021   Little interest or pleasure in doing things 0   Feeling down, depressed, or hopeless 0   Trouble falling or staying asleep, or sleeping too much -   Feeling tired or having little energy -   Poor appetite or overeating -   Feeling bad about yourself - or that you are a failure or have let yourself or your family down -   Trouble concentrating on things, such as reading the newspaper or watching television -   Moving or speaking so slowly that other people could have noticed. Or the opposite - being so fidgety or restless that you have been moving around a lot more than usual -   Thoughts that you would be better off dead, or of hurting yourself in some way -   Total Score 0   If you checked off any problems, how difficult have these problems made it for you to do your work, take care of things at home, or get along with other people? -       Fall Risk Screen:  STEADI Fall Risk Assessment was completed, and patient is at LOW risk for falls.Assessment completed on:2/10/2021    Health Habits and Functional and Cognitive Screening:  Functional & Cognitive Status 2/10/2021   Do you have difficulty preparing food and eating? No   Do you have difficulty bathing yourself, getting dressed or grooming yourself? No   Do you have difficulty using the toilet? No   Do you have difficulty moving around from place to place? No   Do you have trouble with steps or getting out of a bed or a chair? No   Current Diet Well Balanced Diet   Dental Exam Up to date   Eye Exam Up to date   Exercise (times per week) 4 times per week   Current Exercise Activities Include Walking   Do you need help  using the phone?  No   Are you deaf or do you have serious difficulty hearing?  No   Do you need help with transportation? No   Do you need help shopping? No   Do you need help preparing meals?  No   Do you need help with housework?  No   Do you need help with laundry? No   Do you need help taking your medications? No   Do you need help managing money? No   Do you ever drive or ride in a car without wearing a seat belt? No   Have you felt unusual stress, anger or loneliness in the last month? No   Who do you live with? Spouse   If you need help, do you have trouble finding someone available to you? No   Have you been bothered in the last four weeks by sexual problems? No   Do you have difficulty concentrating, remembering or making decisions? No         Does the patient have evidence of cognitive impairment? No    Asprin use counseling:Does not need ASA (and currently is not on it)    Age-appropriate Screening Schedule:  Refer to the list below for future screening recommendations based on patient's age, sex and/or medical conditions. Orders for these recommended tests are listed in the plan section. The patient has been provided with a written plan.    Health Maintenance   Topic Date Due   • DXA SCAN  1950   • ZOSTER VACCINE (2 of 3) 11/11/2013   • MAMMOGRAM  03/18/2021   • LIPID PANEL  02/03/2022   • TDAP/TD VACCINES (2 - Td) 02/28/2029   • COLONOSCOPY  05/24/2029   • INFLUENZA VACCINE  Completed          The following portions of the patient's history were reviewed and updated as appropriate: allergies, current medications, past family history, past medical history, past social history, past surgical history and problem list.    Outpatient Medications Prior to Visit   Medication Sig Dispense Refill   • escitalopram (Lexapro) 10 MG tablet Take 1 tablet by mouth Daily. 30 tablet 5   • lisinopril (PRINIVIL,ZESTRIL) 10 MG tablet Take 1 tablet by mouth Daily. 30 tablet 5   • vitamin B-12 (VITAMIN B-12) 1000 MCG  "tablet Take 1 tablet by mouth Daily.       No facility-administered medications prior to visit.        Patient Active Problem List   Diagnosis   • Primary osteoarthritis of both hips   • Other hyperlipidemia   • Essential hypertension   • Meningioma (CMS/HCC)   • Anxiety   • TGA (transient global amnesia)       Advanced Care Planning:  ACP discussion was held with the patient during this visit. Patient has an advance directive (not in EMR), copy requested.    Review of Systems   Constitutional: Negative for fever.   Respiratory: Negative.    Cardiovascular: Negative.        Compared to one year ago, the patient feels her physical health is the same.  Compared to one year ago, the patient feels her mental health is the same.    Reviewed chart for potential of high risk medication in the elderly: yes  Reviewed chart for potential of harmful drug interactions in the elderly:yes    Objective         Vitals:    02/10/21 1425   BP: 134/84   Pulse: 62   SpO2: 98%   Weight: 84.8 kg (187 lb)   Height: 165.1 cm (65\")   PainSc: 0-No pain       Body mass index is 31.12 kg/m².  Discussed the patient's BMI with her. The BMI is above average; BMI management plan is completed.  Patient's Body mass index is 31.12 kg/m². BMI is above normal parameters. Recommendations include: nutrition counseling.      Physical Exam  Constitutional:       Appearance: She is well-developed.   HENT:      Head: Normocephalic and atraumatic.      Right Ear: Hearing, tympanic membrane and external ear normal.      Left Ear: Hearing, tympanic membrane and external ear normal.      Nose: Nose normal.   Neck:      Musculoskeletal: Neck supple.      Thyroid: No thyromegaly.   Cardiovascular:      Rate and Rhythm: Normal rate and regular rhythm.      Heart sounds: Normal heart sounds. No murmur.   Pulmonary:      Effort: Pulmonary effort is normal.      Breath sounds: Normal breath sounds.   Chest:      Breasts:         Right: No mass.         Left: No mass. "   Abdominal:      General: There is no distension.      Palpations: Abdomen is soft.      Tenderness: There is no abdominal tenderness.   Lymphadenopathy:      Cervical: No cervical adenopathy.   Skin:     General: Skin is warm and dry.   Neurological:      Mental Status: She is alert and oriented to person, place, and time.   Psychiatric:         Speech: Speech normal.         Behavior: Behavior normal.         Thought Content: Thought content normal.         Judgment: Judgment normal.         Lab Results   Component Value Date    GLU 86 02/03/2021    CHLPL 247 (H) 02/03/2021    TRIG 72 02/03/2021    HDL 86 (H) 02/03/2021     (H) 02/03/2021    VLDL 12 02/03/2021        Assessment/Plan   Medicare Risks and Personalized Health Plan  CMS Preventative Services Quick Reference  Breast Cancer/Mammogram Screening    The above risks/problems have been discussed with the patient.  Pertinent information has been shared with the patient in the After Visit Summary.  Follow up plans and orders are seen below in the Assessment/Plan Section.    Diagnoses and all orders for this visit:    1. Medicare annual wellness visit, subsequent (Primary)    2. Well adult exam    3. Menopause  -     DEXA Bone Density Axial    4. Meningioma (CMS/HCC)    5. Essential hypertension    6. Other hyperlipidemia    7. Anxiety    Other orders  -     atorvastatin (Lipitor) 20 MG tablet; Take 1 tablet by mouth Daily.  Dispense: 90 tablet; Refill: 3      HTN.  The patient is advised to continue current dosage of lisinopril  HLD.  I reviewed cholesterol labs with the patient.  I think the patient needs to consider a statin medication.  I would do blood work every 3 months initially to monitor liver enzymes.  Side effects could include muscle aches which the patient should let me know if they have.     Anxiety.  The patient is advised to continue current dosage of lexapro.          Follow Up:  Return in about 1 year (around 2/10/2022) for Medicare  Wellness.     An After Visit Summary and PPPS were given to the patient.

## 2021-02-10 NOTE — PATIENT INSTRUCTIONS
Medicare Wellness  Personal Prevention Plan of Service     Date of Office Visit:  02/10/2021  Encounter Provider:  Lisa Malone MD  Place of Service:  [facility]  Patient Name: Gina HIGUERA:  1950    As part of the Medicare Wellness portion of your visit today, we are providing you with this personalized preventive plan of services (PPPS). This plan is based upon recommendations of the [place] Preventive Services Task Force (USPSTF) and the Advisory Committee on Immunization Practices (ACIP).    This lists the preventive care services that should be considered, and provides dates of when you are due. Items listed as completed are up-to-date and do not require any further intervention.    Health Maintenance   Topic Date Due   • DXA SCAN  1950   • ZOSTER VACCINE (2 of 3) 2013   • ANNUAL WELLNESS VISIT  2021   • MAMMOGRAM  2021   • LIPID PANEL  2022   • TDAP/TD VACCINES (2 - Td) 2029   • COLONOSCOPY  2029   • INFLUENZA VACCINE  Completed   • Pneumococcal Vaccine 65+  Completed   • HEPATITIS C SCREENING  Addressed   • MENINGOCOCCAL VACCINE  Aged Out       No orders of the defined types were placed in this encounter.      Return in about 1 year (around 2/10/2022) for Medicare Wellness.    Ways to sign up.  If no availability, keep checking back.      [email or web address]"/[email or web address]-19-Tier-1b or ([phone]    [email or web address]     [email or web address]    [email or web address]  or (390) [phone] option 1    Dean Guthrie, Ph.D.  [employer] [facility] [address]  [address] [id number] [phone]    Anali Chappell, Ph.D.  [address] [facility] [phone]    Yolette Urban, Ph. D.   [address] [facility] [id number] [phone]    [address]  [address]  [phone]    Donaldo Barbosa, Ph.D.  [address] [place] [address]  [phone]    Cate Mariano, Ph.D.  [address] [place] [address]  [phone]    Jhon Garzon, Psy.D.  [address] [place] [address] [address]  [place] [address]  [phone]    Dr.Elizabeth Hanks  [phone]    William Brown, Psy. D.  [address]   [place] [address]  [phone]    Mickey Stewart, Psy.D. (For children and Family)  [address] [facility]  [facility], [address]  [phone]    Vika Leos, Psy.D.   [address] [facility], [address]  [phone]    Gina Rodriguez, Psy.D.  [address] [facility] [address]  [place] [address]  [phone]    Shaneka Kiser  [address] [facility].   [facility] [address]   [phone]    Stephanie Lui, Ph.D.   [address] [place] [address]  [phone]  For patients with history of brain injury, stoke, spinal cord, parkinson’s, etc…where the focus of treatment pertains to their medical diagnosis. Dr.Libby Gibson also has a great deal of experience working with chronic pain patients.     Almaz Kiser  Licensed Psychologist  [facility]  [address] [place] [address]  [phone]

## 2021-02-24 ENCOUNTER — HOSPITAL ENCOUNTER (OUTPATIENT)
Dept: MRI IMAGING | Facility: HOSPITAL | Age: 71
Discharge: HOME | End: 2021-02-24
Admitting: NURSE PRACTITIONER

## 2021-02-24 DIAGNOSIS — G45.4 TGA (TRANSIENT GLOBAL AMNESIA): ICD-10-CM

## 2021-02-24 LAB — CREAT BLDA-MCNC: 0.9 MG/DL (ref 0.6–1.3)

## 2021-02-24 PROCEDURE — A9577 INJ MULTIHANCE: HCPCS | Performed by: NURSE PRACTITIONER

## 2021-02-24 PROCEDURE — 0 GADOBENATE DIMEGLUMINE 529 MG/ML SOLUTION: Performed by: NURSE PRACTITIONER

## 2021-02-24 PROCEDURE — 70553 MRI BRAIN STEM W/O & W/DYE: CPT

## 2021-02-24 PROCEDURE — 82565 ASSAY OF CREATININE: CPT

## 2021-02-24 RX ADMIN — GADOBENATE DIMEGLUMINE 17 ML: 529 INJECTION, SOLUTION INTRAVENOUS at 10:41

## 2021-02-25 ENCOUNTER — IMMUNIZATION (OUTPATIENT)
Dept: VACCINE CLINIC | Facility: HOSPITAL | Age: 71
End: 2021-02-25

## 2021-02-25 PROCEDURE — 0001A: CPT | Performed by: INTERNAL MEDICINE

## 2021-02-25 PROCEDURE — 91300 HC SARSCOV02 VAC 30MCG/0.3ML IM: CPT | Performed by: INTERNAL MEDICINE

## 2021-03-16 ENCOUNTER — TELEPHONE (OUTPATIENT)
Dept: NEUROSURGERY | Facility: CLINIC | Age: 71
End: 2021-03-16

## 2021-03-17 ENCOUNTER — OFFICE VISIT (OUTPATIENT)
Dept: NEUROSURGERY | Facility: CLINIC | Age: 71
End: 2021-03-17

## 2021-03-17 VITALS
WEIGHT: 189 LBS | DIASTOLIC BLOOD PRESSURE: 94 MMHG | TEMPERATURE: 97.8 F | SYSTOLIC BLOOD PRESSURE: 160 MMHG | HEIGHT: 66 IN | BODY MASS INDEX: 30.37 KG/M2

## 2021-03-17 DIAGNOSIS — D32.0 CEREBRAL MENINGIOMA (HCC): Primary | ICD-10-CM

## 2021-03-17 PROCEDURE — 99213 OFFICE O/P EST LOW 20 MIN: CPT | Performed by: NEUROLOGICAL SURGERY

## 2021-03-18 ENCOUNTER — IMMUNIZATION (OUTPATIENT)
Dept: VACCINE CLINIC | Facility: HOSPITAL | Age: 71
End: 2021-03-18

## 2021-03-18 PROCEDURE — 91300 HC SARSCOV02 VAC 30MCG/0.3ML IM: CPT | Performed by: INTERNAL MEDICINE

## 2021-03-18 PROCEDURE — 0002A: CPT | Performed by: INTERNAL MEDICINE

## 2021-04-08 ENCOUNTER — TELEPHONE (OUTPATIENT)
Dept: INTERNAL MEDICINE | Facility: CLINIC | Age: 71
End: 2021-04-08

## 2021-04-08 NOTE — TELEPHONE ENCOUNTER
Caller: ROMULO     Relationship: Lab    Best call back number: [phone]    What is the best time to reach you: ANYTIME    Who are you requesting to speak with (clinical staff, provider,  specific staff member): PROVIDER OR MEDICAL ASSISTANT    Do you know the name of the person who called: ADRI FROM [facility]    What was the call regarding: [facility] NEEDS TO CONFIRM DIAGNOSIS CODES FOR THE PATIENT. HUB ATTEMPTED TO WARM TRANSFER, BUT WAS UNSUCCESSFUL.     Do you require a callback: YES

## 2021-04-23 RX ORDER — ESCITALOPRAM OXALATE 10 MG/1
TABLET ORAL
Qty: 90 TABLET | Refills: 0 | Status: SHIPPED | OUTPATIENT
Start: 2021-04-23 | End: 2021-07-19

## 2021-04-23 RX ORDER — LISINOPRIL 10 MG/1
TABLET ORAL
Qty: 90 TABLET | Refills: 0 | Status: SHIPPED | OUTPATIENT
Start: 2021-04-23 | End: 2021-09-01

## 2021-05-05 ENCOUNTER — HOSPITAL ENCOUNTER (OUTPATIENT)
Dept: BONE DENSITY | Facility: HOSPITAL | Age: 71
Discharge: HOME | End: 2021-05-05
Admitting: INTERNAL MEDICINE

## 2021-05-05 PROCEDURE — 77080 DXA BONE DENSITY AXIAL: CPT

## 2021-05-12 DIAGNOSIS — I10 ESSENTIAL HYPERTENSION: ICD-10-CM

## 2021-05-12 DIAGNOSIS — E78.49 OTHER HYPERLIPIDEMIA: Primary | ICD-10-CM

## 2021-05-12 LAB
ALBUMIN SERPL-MCNC: 4.6 G/DL (ref 3.5–5.2)
ALBUMIN/GLOB SERPL: 2.3 G/DL
ALP SERPL-CCNC: 73 U/L (ref 39–117)
ALT SERPL-CCNC: 17 U/L (ref 1–33)
AST SERPL-CCNC: 18 U/L (ref 1–32)
BASOPHILS # BLD AUTO: 0.02 10*3/MM3 (ref 0–0.2)
BASOPHILS NFR BLD AUTO: 0.4 % (ref 0–1.5)
BILIRUB SERPL-MCNC: 0.5 MG/DL (ref 0–1.2)
BUN SERPL-MCNC: 20 MG/DL (ref 8–23)
BUN/CREAT SERPL: 24.4 (ref 7–25)
CALCIUM SERPL-MCNC: 10 MG/DL (ref 8.6–10.5)
CHLORIDE SERPL-SCNC: 103 MMOL/L (ref 98–107)
CHOLEST SERPL-MCNC: 183 MG/DL (ref 0–200)
CO2 SERPL-SCNC: 28 MMOL/L (ref 22–29)
CREAT SERPL-MCNC: 0.82 MG/DL (ref 0.57–1)
EOSINOPHIL # BLD AUTO: 0.18 10*3/MM3 (ref 0–0.4)
EOSINOPHIL NFR BLD AUTO: 3.5 % (ref 0.3–6.2)
ERYTHROCYTE [DISTWIDTH] IN BLOOD BY AUTOMATED COUNT: 12.3 % (ref 12.3–15.4)
GLOBULIN SER CALC-MCNC: 2 GM/DL
GLUCOSE SERPL-MCNC: 91 MG/DL (ref 65–99)
HCT VFR BLD AUTO: 48.4 % (ref 34–46.6)
HDLC SERPL-MCNC: 89 MG/DL (ref 40–60)
HGB BLD-MCNC: 15.8 G/DL (ref 12–15.9)
IMM GRANULOCYTES # BLD AUTO: 0.02 10*3/MM3 (ref 0–0.05)
IMM GRANULOCYTES NFR BLD AUTO: 0.4 % (ref 0–0.5)
LDLC SERPL CALC-MCNC: 80 MG/DL (ref 0–100)
LYMPHOCYTES # BLD AUTO: 1.75 10*3/MM3 (ref 0.7–3.1)
LYMPHOCYTES NFR BLD AUTO: 34.2 % (ref 19.6–45.3)
MCH RBC QN AUTO: 31.3 PG (ref 26.6–33)
MCHC RBC AUTO-ENTMCNC: 32.6 G/DL (ref 31.5–35.7)
MCV RBC AUTO: 95.8 FL (ref 79–97)
MONOCYTES # BLD AUTO: 0.52 10*3/MM3 (ref 0.1–0.9)
MONOCYTES NFR BLD AUTO: 10.2 % (ref 5–12)
NEUTROPHILS # BLD AUTO: 2.62 10*3/MM3 (ref 1.7–7)
NEUTROPHILS NFR BLD AUTO: 51.3 % (ref 42.7–76)
NRBC BLD AUTO-RTO: 0 /100 WBC (ref 0–0.2)
PLATELET # BLD AUTO: 246 10*3/MM3 (ref 140–450)
POTASSIUM SERPL-SCNC: 5.1 MMOL/L (ref 3.5–5.2)
PROT SERPL-MCNC: 6.6 G/DL (ref 6–8.5)
RBC # BLD AUTO: 5.05 10*6/MM3 (ref 3.77–5.28)
SODIUM SERPL-SCNC: 139 MMOL/L (ref 136–145)
TRIGL SERPL-MCNC: 75 MG/DL (ref 0–150)
VLDLC SERPL CALC-MCNC: 14 MG/DL (ref 5–40)
WBC # BLD AUTO: 5.11 10*3/MM3 (ref 3.4–10.8)

## 2021-05-26 ENCOUNTER — HOSPITAL ENCOUNTER (OUTPATIENT)
Dept: MAMMOGRAPHY | Facility: HOSPITAL | Age: 71
Discharge: HOME | End: 2021-05-26
Admitting: INTERNAL MEDICINE

## 2021-05-26 DIAGNOSIS — Z12.31 VISIT FOR SCREENING MAMMOGRAM: ICD-10-CM

## 2021-05-26 PROCEDURE — 77067 SCR MAMMO BI INCL CAD: CPT

## 2021-05-26 PROCEDURE — 77063 BREAST TOMOSYNTHESIS BI: CPT

## 2021-07-19 RX ORDER — ESCITALOPRAM OXALATE 10 MG/1
TABLET ORAL
Qty: 90 TABLET | Refills: 0 | Status: SHIPPED | OUTPATIENT
Start: 2021-07-19 | End: 2021-10-18

## 2021-09-01 RX ORDER — LISINOPRIL 10 MG/1
TABLET ORAL
Qty: 90 TABLET | Refills: 0 | Status: SHIPPED | OUTPATIENT
Start: 2021-09-01 | End: 2021-11-29

## 2021-10-18 RX ORDER — ESCITALOPRAM OXALATE 10 MG/1
TABLET ORAL
Qty: 90 TABLET | Refills: 0 | Status: SHIPPED | OUTPATIENT
Start: 2021-10-18 | End: 2022-01-17 | Stop reason: SDUPTHER

## 2021-11-29 RX ORDER — LISINOPRIL 10 MG/1
TABLET ORAL
Qty: 90 TABLET | Refills: 0 | Status: SHIPPED | OUTPATIENT
Start: 2021-11-29 | End: 2022-03-01 | Stop reason: SDUPTHER

## 2021-12-20 ENCOUNTER — TELEPHONE (OUTPATIENT)
Dept: INTERNAL MEDICINE | Facility: CLINIC | Age: 71
End: 2021-12-20

## 2021-12-20 NOTE — TELEPHONE ENCOUNTER
PATIENT CALLING TO LET DR. LOPEZ KNOW THAT SHE HAS SWITCHED PHARMACIES TO [facility] [address] - [address] [facility] - [phone] [facility] [phone]   [phone]

## 2022-01-17 RX ORDER — ESCITALOPRAM OXALATE 10 MG/1
10 TABLET ORAL DAILY
Qty: 90 TABLET | Refills: 0 | Status: SHIPPED | OUTPATIENT
Start: 2022-01-17 | End: 2022-04-18

## 2022-01-17 NOTE — TELEPHONE ENCOUNTER
Caller: Gina Gonzalez    Relationship: Self    Best call back number:     Requested Prescriptions:   Requested Prescriptions     Pending Prescriptions Disp Refills   • escitalopram (LEXAPRO) 10 MG tablet 90 tablet 0     Sig: Take 1 tablet by mouth Daily.        Pharmacy where request should be sent:  [facility]  [address] [place] [address]  [phone]    Additional details provided by patient: PATIENT IS OUT OF THIS MEDICATION.  SHE IS REQUESTING A 90 DAY SUPPLY.      Does the patient have less than a 3 day supply:  [x] Yes  [] No    Sander Kee Rep   01/17/22 10:26 EST

## 2022-02-09 DIAGNOSIS — I10 ESSENTIAL HYPERTENSION: ICD-10-CM

## 2022-02-09 DIAGNOSIS — E78.49 OTHER HYPERLIPIDEMIA: Primary | ICD-10-CM

## 2022-02-10 LAB
ALBUMIN SERPL-MCNC: 4.3 G/DL (ref 3.8–4.8)
ALBUMIN/GLOB SERPL: 1.8 {RATIO} (ref 1.2–2.2)
ALP SERPL-CCNC: 81 IU/L (ref 44–121)
ALT SERPL-CCNC: 18 IU/L (ref 0–32)
APPEARANCE UR: CLEAR
AST SERPL-CCNC: 20 IU/L (ref 0–40)
BACTERIA #/AREA URNS HPF: NORMAL /[HPF]
BASOPHILS # BLD AUTO: 0 X10E3/UL (ref 0–0.2)
BASOPHILS NFR BLD AUTO: 1 %
BILIRUB SERPL-MCNC: 0.3 MG/DL (ref 0–1.2)
BILIRUB UR QL STRIP: NEGATIVE
BUN SERPL-MCNC: 22 MG/DL (ref 8–27)
BUN/CREAT SERPL: 28 (ref 12–28)
CALCIUM SERPL-MCNC: 9.3 MG/DL (ref 8.7–10.3)
CASTS URNS QL MICRO: NORMAL /LPF
CHLORIDE SERPL-SCNC: 104 MMOL/L (ref 96–106)
CHOLEST SERPL-MCNC: 187 MG/DL (ref 100–199)
CO2 SERPL-SCNC: 21 MMOL/L (ref 20–29)
COLOR UR: YELLOW
CREAT SERPL-MCNC: 0.8 MG/DL (ref 0.57–1)
EOSINOPHIL # BLD AUTO: 0.1 X10E3/UL (ref 0–0.4)
EOSINOPHIL NFR BLD AUTO: 1 %
EPI CELLS #/AREA URNS HPF: NORMAL /HPF (ref 0–10)
ERYTHROCYTE [DISTWIDTH] IN BLOOD BY AUTOMATED COUNT: 12.9 % (ref 11.7–15.4)
GLOBULIN SER CALC-MCNC: 2.4 G/DL (ref 1.5–4.5)
GLUCOSE SERPL-MCNC: 89 MG/DL (ref 65–99)
GLUCOSE UR QL STRIP: NEGATIVE
HCT VFR BLD AUTO: 45.1 % (ref 34–46.6)
HDLC SERPL-MCNC: 93 MG/DL
HGB BLD-MCNC: 14.7 G/DL (ref 11.1–15.9)
HGB UR QL STRIP: NEGATIVE
IMM GRANULOCYTES # BLD AUTO: 0 X10E3/UL (ref 0–0.1)
IMM GRANULOCYTES NFR BLD AUTO: 0 %
KETONES UR QL STRIP: NEGATIVE
LDLC SERPL CALC-MCNC: 83 MG/DL (ref 0–99)
LEUKOCYTE ESTERASE UR QL STRIP: ABNORMAL
LYMPHOCYTES # BLD AUTO: 2.1 X10E3/UL (ref 0.7–3.1)
LYMPHOCYTES NFR BLD AUTO: 29 %
MCH RBC QN AUTO: 29.9 PG (ref 26.6–33)
MCHC RBC AUTO-ENTMCNC: 32.6 G/DL (ref 31.5–35.7)
MCV RBC AUTO: 92 FL (ref 79–97)
MICRO URNS: ABNORMAL
MONOCYTES # BLD AUTO: 1 X10E3/UL (ref 0.1–0.9)
MONOCYTES NFR BLD AUTO: 14 %
NEUTROPHILS # BLD AUTO: 3.9 X10E3/UL (ref 1.4–7)
NEUTROPHILS NFR BLD AUTO: 55 %
NITRITE UR QL STRIP: NEGATIVE
PH UR STRIP: 5.5 [PH] (ref 5–7.5)
PLATELET # BLD AUTO: 235 X10E3/UL (ref 150–450)
POTASSIUM SERPL-SCNC: 4.5 MMOL/L (ref 3.5–5.2)
PROT SERPL-MCNC: 6.7 G/DL (ref 6–8.5)
PROT UR QL STRIP: NEGATIVE
RBC # BLD AUTO: 4.92 X10E6/UL (ref 3.77–5.28)
RBC #/AREA URNS HPF: NORMAL /HPF (ref 0–2)
SODIUM SERPL-SCNC: 140 MMOL/L (ref 134–144)
SP GR UR STRIP: 1.02 (ref 1–1.03)
TRIGL SERPL-MCNC: 60 MG/DL (ref 0–149)
TSH SERPL DL<=0.005 MIU/L-ACNC: 3.96 UIU/ML (ref 0.45–4.5)
UROBILINOGEN UR STRIP-MCNC: 0.2 MG/DL (ref 0.2–1)
VLDLC SERPL CALC-MCNC: 11 MG/DL (ref 5–40)
WBC # BLD AUTO: 7.2 X10E3/UL (ref 3.4–10.8)
WBC #/AREA URNS HPF: NORMAL /HPF (ref 0–5)

## 2022-02-16 ENCOUNTER — OFFICE VISIT (OUTPATIENT)
Dept: INTERNAL MEDICINE | Facility: CLINIC | Age: 72
End: 2022-02-16

## 2022-02-16 VITALS
HEART RATE: 90 BPM | BODY MASS INDEX: 30.53 KG/M2 | WEIGHT: 190 LBS | SYSTOLIC BLOOD PRESSURE: 120 MMHG | OXYGEN SATURATION: 98 % | DIASTOLIC BLOOD PRESSURE: 88 MMHG | HEIGHT: 66 IN

## 2022-02-16 DIAGNOSIS — I10 ESSENTIAL HYPERTENSION: ICD-10-CM

## 2022-02-16 DIAGNOSIS — Z00.00 MEDICARE ANNUAL WELLNESS VISIT, SUBSEQUENT: Primary | ICD-10-CM

## 2022-02-16 DIAGNOSIS — Z00.00 WELL ADULT EXAM: ICD-10-CM

## 2022-02-16 DIAGNOSIS — D32.0 CEREBRAL MENINGIOMA: ICD-10-CM

## 2022-02-16 DIAGNOSIS — F41.9 ANXIETY: ICD-10-CM

## 2022-02-16 DIAGNOSIS — E78.49 OTHER HYPERLIPIDEMIA: ICD-10-CM

## 2022-02-16 PROBLEM — D32.9 MENINGIOMA (HCC): Status: RESOLVED | Noted: 2020-10-20 | Resolved: 2022-02-16

## 2022-02-16 PROBLEM — G45.4 TGA (TRANSIENT GLOBAL AMNESIA): Status: RESOLVED | Noted: 2020-10-13 | Resolved: 2022-02-16

## 2022-02-16 PROCEDURE — 96160 PT-FOCUSED HLTH RISK ASSMT: CPT | Performed by: INTERNAL MEDICINE

## 2022-02-16 PROCEDURE — 1126F AMNT PAIN NOTED NONE PRSNT: CPT | Performed by: INTERNAL MEDICINE

## 2022-02-16 PROCEDURE — 99397 PER PM REEVAL EST PAT 65+ YR: CPT | Performed by: INTERNAL MEDICINE

## 2022-02-16 PROCEDURE — G0439 PPPS, SUBSEQ VISIT: HCPCS | Performed by: INTERNAL MEDICINE

## 2022-02-16 PROCEDURE — 1159F MED LIST DOCD IN RCRD: CPT | Performed by: INTERNAL MEDICINE

## 2022-02-16 PROCEDURE — 1170F FXNL STATUS ASSESSED: CPT | Performed by: INTERNAL MEDICINE

## 2022-02-16 NOTE — PROGRESS NOTES
The ABCs of the Annual Wellness Visit  Subsequent Medicare Wellness Visit    Chief Complaint   Patient presents with   • Medicare Wellness-subsequent   • Hypertension   • Hyperlipidemia   • Anxiety      Subjective    History of Present Illness:  Gina Gonzalez is a 71 y.o. female who presents for a Subsequent Medicare Wellness Visit.    The following data was reviewed by: Lsia Malone MD on 02/16/2022:  Common labs    Common Labsle 2/24/21 5/12/21 5/12/21 5/12/21 2/9/22 2/9/22 2/9/22     1023 1023 1023 0936 0936 0936   Glucose  91    89    BUN  20    22    Creatinine 0.90 0.82    0.80    eGFR Non  Am  69    75    eGFR African Am  84    86    Sodium  139    140    Potassium  5.1    4.5    Chloride  103    104    Calcium  10.0    9.3    Total Protein  6.6    6.7    Albumin  4.60    4.3    Total Bilirubin  0.5    0.3    Alkaline Phosphatase  73    81    AST (SGOT)  18    20    ALT (SGPT)  17    18    WBC    5.11 7.2     Hemoglobin    15.8 14.7     Hematocrit    48.4 (A) 45.1     Platelets    246 235     Total Cholesterol   183    187   Triglycerides   75    60   HDL Cholesterol   89 (A)    93   LDL Cholesterol    80    83   (A) Abnormal value       Comments are available for some flowsheets but are not being displayed.           HTN.  Control is good.  HLD.  Control is good on atorvastatin.   Anxiety.  Feels fairly well on Lexapro.    Meningioma.  No issues.  Due check in another year.    The following portions of the patient's history were reviewed and   updated as appropriate: allergies, current medications, past family history, past medical history, past social history, past surgical history and problem list.    Compared to one year ago, the patient feels her physical   health is the same.    Compared to one year ago, the patient feels her mental   health is the same.    Recent Hospitalizations:  She was not admitted to the hospital during the last year.       Current Medical Providers:  Patient Care  "Team:  Lisa Malone MD as PCP - General (Internal Medicine)    Outpatient Medications Prior to Visit   Medication Sig Dispense Refill   • atorvastatin (Lipitor) 20 MG tablet Take 1 tablet by mouth Daily. 90 tablet 3   • escitalopram (LEXAPRO) 10 MG tablet Take 1 tablet by mouth Daily. 90 tablet 0   • lisinopril (PRINIVIL,ZESTRIL) 10 MG tablet Take 1 tablet by mouth once daily 90 tablet 0   • vitamin B-12 (VITAMIN B-12) 1000 MCG tablet Take 1 tablet by mouth Daily.       No facility-administered medications prior to visit.       No opioid medication identified on active medication list. I have reviewed chart for other potential  high risk medication/s and harmful drug interactions in the elderly.          Aspirin is not on active medication list.  Aspirin use is not indicated based on review of current medical condition/s. Risk of harm outweighs potential benefits.  .    Patient Active Problem List   Diagnosis   • Primary osteoarthritis of both hips   • Other hyperlipidemia   • Essential hypertension   • Anxiety   • Cerebral meningioma (HCC)     Advance Care Planning  Advance Directive is not on file.  ACP discussion was held with the patient during this visit. Patient has an advance directive (not in EMR), copy requested.    Review of Systems   Constitutional: Negative.    Respiratory: Negative.    Cardiovascular: Negative.         Objective    Vitals:    02/16/22 1337   BP: 120/88   Pulse: 90   SpO2: 98%   Weight: 86.2 kg (190 lb)   Height: 167.6 cm (65.98\")   PainSc: 0-No pain     BMI Readings from Last 1 Encounters:   02/16/22 30.68 kg/m²   BMI is above normal parameters. Recommendations include: exercise counseling    Does the patient have evidence of cognitive impairment? No    Physical Exam  Constitutional:       Appearance: She is well-developed.   HENT:      Head: Normocephalic and atraumatic.      Right Ear: Hearing, tympanic membrane and external ear normal.      Left Ear: Hearing, tympanic membrane " and external ear normal.      Nose: Nose normal.   Neck:      Thyroid: No thyromegaly.   Cardiovascular:      Rate and Rhythm: Normal rate and regular rhythm.      Heart sounds: Normal heart sounds. No murmur heard.      Pulmonary:      Effort: Pulmonary effort is normal.      Breath sounds: Normal breath sounds.   Chest:   Breasts:      Right: No mass.      Left: No mass.       Abdominal:      General: There is no distension.      Palpations: Abdomen is soft.      Tenderness: There is no abdominal tenderness.   Musculoskeletal:      Cervical back: Neck supple.   Lymphadenopathy:      Cervical: No cervical adenopathy.   Skin:     General: Skin is warm and dry.   Neurological:      Mental Status: She is alert and oriented to person, place, and time.   Psychiatric:         Speech: Speech normal.         Behavior: Behavior normal.         Thought Content: Thought content normal.         Judgment: Judgment normal.       Lab Results   Component Value Date    CHLPL 187 2022    TRIG 60 2022    HDL 93 2022    LDL 83 2022    VLDL 11 2022            HEALTH RISK ASSESSMENT    Smoking Status:  Social History     Tobacco Use   Smoking Status Former Smoker   • Quit date:    • Years since quittin.1   Smokeless Tobacco Never Used     Alcohol Consumption:  Social History     Substance and Sexual Activity   Alcohol Use Yes   • Alcohol/week: 7.0 standard drinks   • Types: 7 Glasses of wine per week     Fall Risk Screen:    STEADI Fall Risk Assessment was completed, and patient is at LOW risk for falls.Assessment completed on:2022    Depression Screening:  PHQ-2/PHQ-9 Depression Screening 2022   Little interest or pleasure in doing things 0   Feeling down, depressed, or hopeless 0   Trouble falling or staying asleep, or sleeping too much -   Feeling tired or having little energy -   Poor appetite or overeating -   Feeling bad about yourself - or that you are a failure or have let  yourself or your family down -   Trouble concentrating on things, such as reading the newspaper or watching television -   Moving or speaking so slowly that other people could have noticed. Or the opposite - being so fidgety or restless that you have been moving around a lot more than usual -   Thoughts that you would be better off dead, or of hurting yourself in some way -   Total Score 0   If you checked off any problems, how difficult have these problems made it for you to do your work, take care of things at home, or get along with other people? -       Health Habits and Functional and Cognitive Screening:  Functional & Cognitive Status 2/16/2022   Do you have difficulty preparing food and eating? No   Do you have difficulty bathing yourself, getting dressed or grooming yourself? No   Do you have difficulty using the toilet? No   Do you have difficulty moving around from place to place? No   Do you have trouble with steps or getting out of a bed or a chair? No   Current Diet Well Balanced Diet   Dental Exam Up to date   Eye Exam Up to date   Exercise (times per week) 2 times per week   Current Exercises Include House Cleaning;Walking   Current Exercise Activities Include -   Do you need help using the phone?  No   Are you deaf or do you have serious difficulty hearing?  No   Do you need help with transportation? No   Do you need help shopping? No   Do you need help preparing meals?  No   Do you need help with housework?  No   Do you need help with laundry? No   Do you need help taking your medications? No   Do you need help managing money? No   Do you ever drive or ride in a car without wearing a seat belt? No   Have you felt unusual stress, anger or loneliness in the last month? No   Who do you live with? Spouse   If you need help, do you have trouble finding someone available to you? No   Have you been bothered in the last four weeks by sexual problems? No   Do you have difficulty concentrating, remembering or  making decisions? No       Age-appropriate Screening Schedule:  Refer to the list below for future screening recommendations based on patient's age, sex and/or medical conditions. Orders for these recommended tests are listed in the plan section. The patient has been provided with a written plan.    Health Maintenance   Topic Date Due   • ZOSTER VACCINE (2 of 3) 11/11/2013   • MAMMOGRAM  05/26/2022   • LIPID PANEL  02/09/2023   • DXA SCAN  05/05/2023   • TDAP/TD VACCINES (2 - Td or Tdap) 02/28/2029   • INFLUENZA VACCINE  Completed              Assessment/Plan   CMS Preventative Services Quick Reference  Risk Factors Identified During Encounter  Immunizations Discussed/Encouraged (specific Immunizations; Shingrix  The above risks/problems have been discussed with the patient.  Follow up actions/plans if indicated are seen below in the Assessment/Plan Section.  Pertinent information has been shared with the patient in the After Visit Summary.    Diagnoses and all orders for this visit:    1. Medicare annual wellness visit, subsequent (Primary)    2. Well adult exam    3. Essential hypertension    4. Other hyperlipidemia    5. Cerebral meningioma (HCC)    6. Anxiety        Follow Up:   Return in about 6 months (around 8/16/2022) for Recheck.     An After Visit Summary and PPPS were made available to the patient.

## 2022-02-16 NOTE — PATIENT INSTRUCTIONS
Medicare Wellness  Personal Prevention Plan of Service     Date of Office Visit:  2022  Encounter Provider:  Lisa Malone MD  Place of Service:  [facility]  Patient Name: Gina HIGUERA:  1950    As part of the Medicare Wellness portion of your visit today, we are providing you with this personalized preventive plan of services (PPPS). This plan is based upon recommendations of the [place] Preventive Services Task Force (USPSTF) and the Advisory Committee on Immunization Practices (ACIP).    This lists the preventive care services that should be considered, and provides dates of when you are due. Items listed as completed are up-to-date and do not require any further intervention.    Health Maintenance   Topic Date Due   • ZOSTER VACCINE (2 of 3) 2013   • ANNUAL WELLNESS VISIT  02/10/2022   • MAMMOGRAM  2022   • LIPID PANEL  2023   • DXA SCAN  2023   • TDAP/TD VACCINES (2 - Td or Tdap) 2029   • COLORECTAL CANCER SCREENING  2029   • COVID-19 Vaccine  Completed   • INFLUENZA VACCINE  Completed   • Pneumococcal Vaccine 65+  Completed   • HEPATITIS C SCREENING  Addressed       No orders of the defined types were placed in this encounter.      No follow-ups on file.

## 2022-03-01 RX ORDER — LISINOPRIL 10 MG/1
10 TABLET ORAL DAILY
Qty: 90 TABLET | Refills: 1 | Status: SHIPPED | OUTPATIENT
Start: 2022-03-01 | End: 2022-08-29

## 2022-03-08 RX ORDER — ATORVASTATIN CALCIUM 20 MG/1
20 TABLET, FILM COATED ORAL DAILY
Qty: 90 TABLET | Refills: 3 | Status: SHIPPED | OUTPATIENT
Start: 2022-03-08 | End: 2023-04-03

## 2022-04-13 ENCOUNTER — TRANSCRIBE ORDERS (OUTPATIENT)
Dept: ADMINISTRATIVE | Facility: HOSPITAL | Age: 72
End: 2022-04-13

## 2022-04-13 DIAGNOSIS — Z12.31 SCREENING MAMMOGRAM, ENCOUNTER FOR: Primary | ICD-10-CM

## 2022-04-18 RX ORDER — ESCITALOPRAM OXALATE 10 MG/1
TABLET ORAL
Qty: 90 TABLET | Refills: 0 | Status: SHIPPED | OUTPATIENT
Start: 2022-04-18 | End: 2022-07-20

## 2022-07-06 ENCOUNTER — HOSPITAL ENCOUNTER (OUTPATIENT)
Dept: MAMMOGRAPHY | Facility: HOSPITAL | Age: 72
Discharge: HOME | End: 2022-07-06
Admitting: INTERNAL MEDICINE

## 2022-07-06 DIAGNOSIS — Z12.31 SCREENING MAMMOGRAM, ENCOUNTER FOR: ICD-10-CM

## 2022-07-06 PROCEDURE — 77067 SCR MAMMO BI INCL CAD: CPT

## 2022-07-06 PROCEDURE — 77063 BREAST TOMOSYNTHESIS BI: CPT

## 2022-07-20 RX ORDER — ESCITALOPRAM OXALATE 10 MG/1
TABLET ORAL
Qty: 90 TABLET | Refills: 0 | Status: SHIPPED | OUTPATIENT
Start: 2022-07-20 | End: 2022-10-17

## 2022-08-17 DIAGNOSIS — I10 ESSENTIAL HYPERTENSION: ICD-10-CM

## 2022-08-17 DIAGNOSIS — E78.49 OTHER HYPERLIPIDEMIA: Primary | ICD-10-CM

## 2022-08-18 LAB
ALBUMIN SERPL-MCNC: 4.2 G/DL (ref 3.7–4.7)
ALBUMIN/GLOB SERPL: 2 {RATIO} (ref 1.2–2.2)
ALP SERPL-CCNC: 76 IU/L (ref 44–121)
ALT SERPL-CCNC: 14 IU/L (ref 0–32)
AST SERPL-CCNC: 18 IU/L (ref 0–40)
BASOPHILS # BLD AUTO: 0 X10E3/UL (ref 0–0.2)
BASOPHILS NFR BLD AUTO: 1 %
BILIRUB SERPL-MCNC: 0.5 MG/DL (ref 0–1.2)
BUN SERPL-MCNC: 20 MG/DL (ref 8–27)
BUN/CREAT SERPL: 22 (ref 12–28)
CALCIUM SERPL-MCNC: 9.1 MG/DL (ref 8.7–10.3)
CHLORIDE SERPL-SCNC: 106 MMOL/L (ref 96–106)
CHOLEST SERPL-MCNC: 164 MG/DL (ref 100–199)
CO2 SERPL-SCNC: 24 MMOL/L (ref 20–29)
CREAT SERPL-MCNC: 0.92 MG/DL (ref 0.57–1)
EGFRCR-CYS SERPLBLD CKD-EPI 2021: 67 ML/MIN/1.73
EOSINOPHIL # BLD AUTO: 0.2 X10E3/UL (ref 0–0.4)
EOSINOPHIL NFR BLD AUTO: 4 %
ERYTHROCYTE [DISTWIDTH] IN BLOOD BY AUTOMATED COUNT: 13.1 % (ref 11.7–15.4)
GLOBULIN SER CALC-MCNC: 2.1 G/DL (ref 1.5–4.5)
GLUCOSE SERPL-MCNC: 91 MG/DL (ref 65–99)
HCT VFR BLD AUTO: 41.8 % (ref 34–46.6)
HDLC SERPL-MCNC: 77 MG/DL
HGB BLD-MCNC: 13.7 G/DL (ref 11.1–15.9)
IMM GRANULOCYTES # BLD AUTO: 0 X10E3/UL (ref 0–0.1)
IMM GRANULOCYTES NFR BLD AUTO: 0 %
LDLC SERPL CALC-MCNC: 73 MG/DL (ref 0–99)
LYMPHOCYTES # BLD AUTO: 1.6 X10E3/UL (ref 0.7–3.1)
LYMPHOCYTES NFR BLD AUTO: 33 %
MCH RBC QN AUTO: 29.8 PG (ref 26.6–33)
MCHC RBC AUTO-ENTMCNC: 32.8 G/DL (ref 31.5–35.7)
MCV RBC AUTO: 91 FL (ref 79–97)
MONOCYTES # BLD AUTO: 0.5 X10E3/UL (ref 0.1–0.9)
MONOCYTES NFR BLD AUTO: 10 %
NEUTROPHILS # BLD AUTO: 2.5 X10E3/UL (ref 1.4–7)
NEUTROPHILS NFR BLD AUTO: 52 %
PLATELET # BLD AUTO: 256 X10E3/UL (ref 150–450)
POTASSIUM SERPL-SCNC: 4.3 MMOL/L (ref 3.5–5.2)
PROT SERPL-MCNC: 6.3 G/DL (ref 6–8.5)
RBC # BLD AUTO: 4.6 X10E6/UL (ref 3.77–5.28)
SODIUM SERPL-SCNC: 142 MMOL/L (ref 134–144)
TRIGL SERPL-MCNC: 74 MG/DL (ref 0–149)
VLDLC SERPL CALC-MCNC: 14 MG/DL (ref 5–40)
WBC # BLD AUTO: 4.9 X10E3/UL (ref 3.4–10.8)

## 2022-08-29 RX ORDER — LISINOPRIL 10 MG/1
TABLET ORAL
Qty: 90 TABLET | Refills: 1 | Status: SHIPPED | OUTPATIENT
Start: 2022-08-29 | End: 2023-02-22

## 2022-10-17 ENCOUNTER — PATIENT OUTREACH (OUTPATIENT)
Dept: PHARMACY | Facility: TELEHEALTH | Age: 72
End: 2022-10-17

## 2022-10-17 RX ORDER — ESCITALOPRAM OXALATE 10 MG/1
TABLET ORAL
Qty: 90 TABLET | Refills: 0 | Status: SHIPPED | OUTPATIENT
Start: 2022-10-17 | End: 2023-01-16

## 2022-10-17 NOTE — OUTREACH NOTE
Medication Adherence Call    Patient was called today to discuss medication adherence, as she was identified as having care opportunities.    she denies issues with adherence and denies any barriers to receiving medications.    Cecilia Cruz, PharmD  [facility]  10/17/22

## 2023-01-16 RX ORDER — ESCITALOPRAM OXALATE 10 MG/1
TABLET ORAL
Qty: 90 TABLET | Refills: 0 | Status: SHIPPED | OUTPATIENT
Start: 2023-01-16

## 2023-02-14 DIAGNOSIS — I10 ESSENTIAL HYPERTENSION: ICD-10-CM

## 2023-02-14 DIAGNOSIS — E78.49 OTHER HYPERLIPIDEMIA: Primary | ICD-10-CM

## 2023-02-16 LAB
ALBUMIN SERPL-MCNC: 4.3 G/DL (ref 3.5–5.2)
ALBUMIN/GLOB SERPL: 2 G/DL
ALP SERPL-CCNC: 77 U/L (ref 39–117)
ALT SERPL-CCNC: 22 U/L (ref 1–33)
APPEARANCE UR: CLEAR
AST SERPL-CCNC: 22 U/L (ref 1–32)
BACTERIA #/AREA URNS HPF: NORMAL /HPF
BASOPHILS # BLD AUTO: 0.03 10*3/MM3 (ref 0–0.2)
BASOPHILS NFR BLD AUTO: 0.6 % (ref 0–1.5)
BILIRUB SERPL-MCNC: 0.6 MG/DL (ref 0–1.2)
BILIRUB UR QL STRIP: NEGATIVE
BUN SERPL-MCNC: 21 MG/DL (ref 8–23)
BUN/CREAT SERPL: 24.7 (ref 7–25)
CALCIUM SERPL-MCNC: 9.6 MG/DL (ref 8.6–10.5)
CASTS URNS QL MICRO: NORMAL /LPF
CHLORIDE SERPL-SCNC: 105 MMOL/L (ref 98–107)
CHOLEST SERPL-MCNC: 188 MG/DL (ref 0–200)
CO2 SERPL-SCNC: 29 MMOL/L (ref 22–29)
COLOR UR: YELLOW
CREAT SERPL-MCNC: 0.85 MG/DL (ref 0.57–1)
EGFRCR SERPLBLD CKD-EPI 2021: 73.4 ML/MIN/1.73
EOSINOPHIL # BLD AUTO: 0.15 10*3/MM3 (ref 0–0.4)
EOSINOPHIL NFR BLD AUTO: 3.1 % (ref 0.3–6.2)
EPI CELLS #/AREA URNS HPF: NORMAL /HPF (ref 0–10)
ERYTHROCYTE [DISTWIDTH] IN BLOOD BY AUTOMATED COUNT: 12.6 % (ref 12.3–15.4)
GLOBULIN SER CALC-MCNC: 2.2 GM/DL
GLUCOSE SERPL-MCNC: 94 MG/DL (ref 65–99)
GLUCOSE UR QL STRIP: NEGATIVE
HCT VFR BLD AUTO: 41.4 % (ref 34–46.6)
HDLC SERPL-MCNC: 81 MG/DL (ref 40–60)
HGB BLD-MCNC: 13.9 G/DL (ref 12–15.9)
HGB UR QL STRIP: NEGATIVE
IMM GRANULOCYTES # BLD AUTO: 0.03 10*3/MM3 (ref 0–0.05)
IMM GRANULOCYTES NFR BLD AUTO: 0.6 % (ref 0–0.5)
KETONES UR QL STRIP: NEGATIVE
LDLC SERPL CALC-MCNC: 93 MG/DL (ref 0–100)
LEUKOCYTE ESTERASE UR QL STRIP: NEGATIVE
LYMPHOCYTES # BLD AUTO: 1.81 10*3/MM3 (ref 0.7–3.1)
LYMPHOCYTES NFR BLD AUTO: 37.3 % (ref 19.6–45.3)
MCH RBC QN AUTO: 30.5 PG (ref 26.6–33)
MCHC RBC AUTO-ENTMCNC: 33.6 G/DL (ref 31.5–35.7)
MCV RBC AUTO: 91 FL (ref 79–97)
MICRO URNS: NORMAL
MICRO URNS: NORMAL
MONOCYTES # BLD AUTO: 0.58 10*3/MM3 (ref 0.1–0.9)
MONOCYTES NFR BLD AUTO: 12 % (ref 5–12)
NEUTROPHILS # BLD AUTO: 2.25 10*3/MM3 (ref 1.7–7)
NEUTROPHILS NFR BLD AUTO: 46.4 % (ref 42.7–76)
NITRITE UR QL STRIP: NEGATIVE
NRBC BLD AUTO-RTO: 0 /100 WBC (ref 0–0.2)
PH UR STRIP: 7.5 [PH] (ref 5–7.5)
PLATELET # BLD AUTO: 240 10*3/MM3 (ref 140–450)
POTASSIUM SERPL-SCNC: 5 MMOL/L (ref 3.5–5.2)
PROT SERPL-MCNC: 6.5 G/DL (ref 6–8.5)
PROT UR QL STRIP: NEGATIVE
RBC # BLD AUTO: 4.55 10*6/MM3 (ref 3.77–5.28)
RBC #/AREA URNS HPF: NORMAL /HPF (ref 0–2)
SODIUM SERPL-SCNC: 139 MMOL/L (ref 136–145)
SP GR UR STRIP: 1.01 (ref 1–1.03)
TRIGL SERPL-MCNC: 76 MG/DL (ref 0–150)
TSH SERPL DL<=0.005 MIU/L-ACNC: 3.33 UIU/ML (ref 0.27–4.2)
URINALYSIS REFLEX: NORMAL
UROBILINOGEN UR STRIP-MCNC: 0.2 MG/DL (ref 0.2–1)
VLDLC SERPL CALC-MCNC: 14 MG/DL (ref 5–40)
WBC # BLD AUTO: 4.85 10*3/MM3 (ref 3.4–10.8)
WBC #/AREA URNS HPF: NORMAL /HPF (ref 0–5)

## 2023-02-22 ENCOUNTER — OFFICE VISIT (OUTPATIENT)
Dept: INTERNAL MEDICINE | Facility: CLINIC | Age: 73
End: 2023-02-22
Payer: MEDICARE

## 2023-02-22 VITALS
HEART RATE: 60 BPM | OXYGEN SATURATION: 98 % | DIASTOLIC BLOOD PRESSURE: 80 MMHG | HEIGHT: 66 IN | BODY MASS INDEX: 31.18 KG/M2 | SYSTOLIC BLOOD PRESSURE: 130 MMHG | WEIGHT: 194 LBS

## 2023-02-22 DIAGNOSIS — E78.49 OTHER HYPERLIPIDEMIA: ICD-10-CM

## 2023-02-22 DIAGNOSIS — Z00.00 MEDICARE ANNUAL WELLNESS VISIT, SUBSEQUENT: Primary | ICD-10-CM

## 2023-02-22 DIAGNOSIS — F41.9 ANXIETY: ICD-10-CM

## 2023-02-22 DIAGNOSIS — I10 ESSENTIAL HYPERTENSION: ICD-10-CM

## 2023-02-22 DIAGNOSIS — Z00.00 WELL ADULT EXAM: ICD-10-CM

## 2023-02-22 PROCEDURE — 1126F AMNT PAIN NOTED NONE PRSNT: CPT | Performed by: INTERNAL MEDICINE

## 2023-02-22 PROCEDURE — 96160 PT-FOCUSED HLTH RISK ASSMT: CPT | Performed by: INTERNAL MEDICINE

## 2023-02-22 PROCEDURE — 99397 PER PM REEVAL EST PAT 65+ YR: CPT | Performed by: INTERNAL MEDICINE

## 2023-02-22 PROCEDURE — G0439 PPPS, SUBSEQ VISIT: HCPCS | Performed by: INTERNAL MEDICINE

## 2023-02-22 PROCEDURE — 1170F FXNL STATUS ASSESSED: CPT | Performed by: INTERNAL MEDICINE

## 2023-02-22 PROCEDURE — 1159F MED LIST DOCD IN RCRD: CPT | Performed by: INTERNAL MEDICINE

## 2023-02-22 RX ORDER — LISINOPRIL 10 MG/1
TABLET ORAL
Qty: 90 TABLET | Refills: 1 | Status: SHIPPED | OUTPATIENT
Start: 2023-02-22

## 2023-02-22 NOTE — PROGRESS NOTES
The ABCs of the Annual Wellness Visit  Subsequent Medicare Wellness Visit    Subjective    Gina Gonzalez is a 73 y.o. female who presents for a Subsequent Medicare Wellness Visit.    The following portions of the patient's history were reviewed and   updated as appropriate: allergies, current medications, past family history, past medical history, past social history, past surgical history and problem list.    Compared to one year ago, the patient feels her physical   health is the same.    Compared to one year ago, the patient feels her mental   health is the same.    Recent Hospitalizations:  She was not admitted to the hospital during the last year.       Current Medical Providers:  Patient Care Team:  Lisa Malone MD as PCP - General (Internal Medicine)    Outpatient Medications Prior to Visit   Medication Sig Dispense Refill   • atorvastatin (Lipitor) 20 MG tablet Take 1 tablet by mouth Daily. 90 tablet 3   • escitalopram (LEXAPRO) 10 MG tablet TAKE ONE TABLET BY MOUTH DAILY 90 tablet 0   • lisinopril (PRINIVIL,ZESTRIL) 10 MG tablet TAKE ONE TABLET BY MOUTH DAILY 90 tablet 1   • vitamin B-12 (VITAMIN B-12) 1000 MCG tablet Take 1 tablet by mouth Daily.       No facility-administered medications prior to visit.       No opioid medication identified on active medication list. I have reviewed chart for other potential  high risk medication/s and harmful drug interactions in the elderly.          Aspirin is not on active medication list.  Aspirin use is not indicated based on review of current medical condition/s. Risk of harm outweighs potential benefits.  .    Patient Active Problem List   Diagnosis   • Primary osteoarthritis of both hips   • Other hyperlipidemia   • Essential hypertension   • Anxiety   • Cerebral meningioma (HCC)     Advance Care Planning  Advance Directive is not on file.  ACP discussion was held with the patient during this visit. Patient has an advance directive (not in EMR), copy  "requested.     Objective    Vitals:    23 1123   BP: 130/80   Pulse: 60   SpO2: 98%   Weight: 88 kg (194 lb)   Height: 167.6 cm (65.98\")   PainSc: 0-No pain     Estimated body mass index is 31.33 kg/m² as calculated from the following:    Height as of this encounter: 167.6 cm (65.98\").    Weight as of this encounter: 88 kg (194 lb).    BMI is >= 30 and <35. (Class 1 Obesity). The following options were offered after discussion;: exercise counseling/recommendations      Does the patient have evidence of cognitive impairment? No    Lab Results   Component Value Date    CHLPL 188 02/15/2023    TRIG 76 02/15/2023    HDL 81 (H) 02/15/2023    LDL 93 02/15/2023    VLDL 14 02/15/2023        HEALTH RISK ASSESSMENT    Smoking Status:  Social History     Tobacco Use   Smoking Status Former   • Types: Cigarettes   • Quit date:    • Years since quittin.1   Smokeless Tobacco Never     Alcohol Consumption:  Social History     Substance and Sexual Activity   Alcohol Use Yes   • Alcohol/week: 7.0 standard drinks   • Types: 7 Glasses of wine per week     Fall Risk Screen:    RADHA Fall Risk Assessment was completed, and patient is at LOW risk for falls.Assessment completed on:2023    Depression Screening:  PHQ-2/PHQ-9 Depression Screening 2023   Little Interest or Pleasure in Doing Things 0-->not at all   Feeling Down, Depressed or Hopeless 0-->not at all   PHQ-9: Brief Depression Severity Measure Score 0       Health Habits and Functional and Cognitive Screening:  Functional & Cognitive Status 2023   Do you have difficulty preparing food and eating? No   Do you have difficulty bathing yourself, getting dressed or grooming yourself? No   Do you have difficulty using the toilet? No   Do you have difficulty moving around from place to place? No   Do you have trouble with steps or getting out of a bed or a chair? No   Current Diet Well Balanced Diet   Dental Exam Up to date   Eye Exam Up to date   Exercise " (times per week) 5 times per week   Current Exercises Include Walking   Current Exercise Activities Include -   Do you need help using the phone?  No   Are you deaf or do you have serious difficulty hearing?  No   Do you need help with transportation? No   Do you need help shopping? No   Do you need help preparing meals?  No   Do you need help with housework?  No   Do you need help with laundry? No   Do you need help taking your medications? No   Do you need help managing money? No   Do you ever drive or ride in a car without wearing a seat belt? No   Have you felt unusual stress, anger or loneliness in the last month? No   Who do you live with? Spouse   If you need help, do you have trouble finding someone available to you? No   Have you been bothered in the last four weeks by sexual problems? No   Do you have difficulty concentrating, remembering or making decisions? No       Age-appropriate Screening Schedule:  Refer to the list below for future screening recommendations based on patient's age, sex and/or medical conditions. Orders for these recommended tests are listed in the plan section. The patient has been provided with a written plan.    Health Maintenance   Topic Date Due   • ZOSTER VACCINE (2 of 3) 11/11/2013   • ANNUAL WELLNESS VISIT  02/16/2023   • COVID-19 Vaccine (4 - Booster for Pfizer series) 02/24/2023 (Originally 11/27/2021)   • DXA SCAN  05/05/2023   • MAMMOGRAM  07/06/2023   • LIPID PANEL  02/15/2024   • TDAP/TD VACCINES (2 - Td or Tdap) 02/28/2029   • COLORECTAL CANCER SCREENING  05/24/2029   • INFLUENZA VACCINE  Completed   • Pneumococcal Vaccine 65+  Completed   • HEPATITIS C SCREENING  Addressed                CMS Preventative Services Quick Reference  Risk Factors Identified During Encounter  Immunizations Discussed/Encouraged: Shingrix  The above risks/problems have been discussed with the patient.  Pertinent information has been shared with the patient in the After Visit Summary.  An  "After Visit Summary and PPPS were made available to the patient.    Follow Up:   Next Medicare Wellness visit to be scheduled in 1 year.       Additional E&M Note during same encounter follows:  Patient has multiple medical problems which are significant and separately identifiable that require additional work above and beyond the Medicare Wellness Visit.      Chief Complaint  Medicare Wellness-subsequent and Annual Exam    Subjective        HPI  Gina Gonzalez is also being seen today for CPE, HTN, HLD, Anxiety.     HTN.  Control is good.  HLD.  Good LDL control on atorvastatin.   Anxiety.  Well controlled with Lexapro.      ROS  No CP  No SOA.      Objective   Vital Signs:  /80   Pulse 60   Ht 167.6 cm (65.98\")   Wt 88 kg (194 lb)   SpO2 98%   BMI 31.33 kg/m²     Physical Exam  Constitutional:       Appearance: She is well-developed.   HENT:      Head: Normocephalic and atraumatic.      Right Ear: Hearing, tympanic membrane and external ear normal.      Left Ear: Hearing, tympanic membrane and external ear normal.      Nose: Nose normal.   Neck:      Thyroid: No thyromegaly.   Cardiovascular:      Rate and Rhythm: Normal rate and regular rhythm.      Heart sounds: Normal heart sounds. No murmur heard.  Pulmonary:      Effort: Pulmonary effort is normal.      Breath sounds: Normal breath sounds.   Abdominal:      General: There is no distension.      Palpations: Abdomen is soft.      Tenderness: There is no abdominal tenderness.   Musculoskeletal:      Cervical back: Neck supple.   Lymphadenopathy:      Cervical: No cervical adenopathy.   Skin:     General: Skin is warm and dry.   Neurological:      Mental Status: She is alert and oriented to person, place, and time.   Psychiatric:         Speech: Speech normal.         Behavior: Behavior normal.         Thought Content: Thought content normal.         Judgment: Judgment normal.          The following data was reviewed by: Lisa Malone MD on " 02/22/2023:  Common labs    Common Labs 8/17/22 8/17/22 8/17/22 2/15/23 2/15/23 2/15/23    1005 1005 1005 1013 1013 1013   Glucose  91   94    BUN  20   21    Creatinine  0.92   0.85    Sodium  142   139    Potassium  4.3   5.0    Chloride  106   105    Calcium  9.1   9.6    Total Protein  6.3   6.5    Albumin  4.2   4.3    Total Bilirubin  0.5   0.6    Alkaline Phosphatase  76   77    AST (SGOT)  18   22    ALT (SGPT)  14   22    WBC 4.9   4.85     Hemoglobin 13.7   13.9     Hematocrit 41.8   41.4     Platelets 256   240     Total Cholesterol   164   188   Triglycerides   74   76   HDL Cholesterol   77   81 (A)   LDL Cholesterol    73   93   (A) Abnormal value       Comments are available for some flowsheets but are not being displayed.                      Assessment and Plan   Diagnoses and all orders for this visit:    1. Medicare annual wellness visit, subsequent (Primary)    2. Well adult exam    3. Essential hypertension    4. Other hyperlipidemia    5. Anxiety      HTN.  Control is good.  The patient is advised to continue current dosage of lisinopril.   Hld.  Control is good.  The patient is advised to continue current dosage of atorvastatin.    Anxiety.  Control is good.  The patient is advised to continue current dosage of Lexapro.    Patient follows a healthy diet.   Patient follows an adequate exercise regimen. Mammogram is up to date.   Colon cancer screening is up to date.   Pap smears are no longer performed secondary to age and low risk.   DEXA is up to date.    I do recommend a Shingles shot.         Follow Up   Return in about 1 year (around 2/22/2024).  Patient was given instructions and counseling regarding her condition or for health maintenance advice. Please see specific information pulled into the AVS if appropriate.

## 2023-02-22 NOTE — PATIENT INSTRUCTIONS
Medicare Wellness  Personal Prevention Plan of Service     Date of Office Visit:    Encounter Provider:  Lisa Malone MD  Place of Service:  [facility]  Patient Name: Gina HIGUERA:  1950    As part of the Medicare Wellness portion of your visit today, we are providing you with this personalized preventive plan of services (PPPS). This plan is based upon recommendations of the [place] Preventive Services Task Force (USPSTF) and the Advisory Committee on Immunization Practices (ACIP).    This lists the preventive care services that should be considered, and provides dates of when you are due. Items listed as completed are up-to-date and do not require any further intervention.    Health Maintenance   Topic Date Due    ZOSTER VACCINE (2 of 3) 2013    ANNUAL WELLNESS VISIT  2023    COVID-19 Vaccine (4 - Booster for Pfizer series) 2023 (Originally 2021)    DXA SCAN  2023    MAMMOGRAM  2023    LIPID PANEL  02/15/2024    TDAP/TD VACCINES (2 - Td or Tdap) 2029    COLORECTAL CANCER SCREENING  2029    INFLUENZA VACCINE  Completed    Pneumococcal Vaccine 65+  Completed    HEPATITIS C SCREENING  Addressed       No orders of the defined types were placed in this encounter.      Return in about 1 year (around 2024).

## 2023-04-03 RX ORDER — ATORVASTATIN CALCIUM 20 MG/1
TABLET, FILM COATED ORAL
Qty: 90 TABLET | Refills: 3 | Status: SHIPPED | OUTPATIENT
Start: 2023-04-03

## 2023-04-10 RX ORDER — ESCITALOPRAM OXALATE 10 MG/1
TABLET ORAL
Qty: 90 TABLET | Refills: 0 | Status: SHIPPED | OUTPATIENT
Start: 2023-04-10

## 2023-05-15 ENCOUNTER — TELEPHONE (OUTPATIENT)
Dept: INTERNAL MEDICINE | Facility: CLINIC | Age: 73
End: 2023-05-15

## 2023-05-15 NOTE — TELEPHONE ENCOUNTER
Caller: Gina Gonzalez    Relationship: Self    Best call back number: [phone] (Mobile) OK TO LEAVE A MESSAGE IF NO ANSWER.     What medication are you requesting: FOR ANXIETY.    What are your current symptoms: STRESS FROM CARING FOR  WITH DEMENTIA.     How long have you been experiencing symptoms:      Have you had these symptoms before:    [] Yes  [] No    Have you been treated for these symptoms before:   [] Yes  [] No    If a prescription is needed, what is your preferred pharmacy and phone number: [facility] [id number] - [place], [place] - [address] AT [facility] - [phone] [facility] [phone] FX          Additional notes: PLEASE CONTACT PATIENT TO ADVISE.          THANKS

## 2023-05-15 NOTE — TELEPHONE ENCOUNTER
Talked with patient. She didn't know if increase of lexapro would help with anxiety or she needs something else. Okay to wait for Dr Mlaone per patient.

## 2023-05-15 NOTE — TELEPHONE ENCOUNTER
Call and advise.  Max dose of Lexapro in people over 65 is 10 mg per day.  I would need to switch to something else.  I would like to see her for f/u visit.  PEGGY

## 2023-05-15 NOTE — TELEPHONE ENCOUNTER
Scheduled patient to see Dr. Malone 05/19/2023 at 3:15. Patient is aware of appointment information.

## 2023-05-19 ENCOUNTER — OFFICE VISIT (OUTPATIENT)
Dept: INTERNAL MEDICINE | Facility: CLINIC | Age: 73
End: 2023-05-19
Payer: MEDICARE

## 2023-05-19 VITALS
HEART RATE: 61 BPM | HEIGHT: 66 IN | DIASTOLIC BLOOD PRESSURE: 72 MMHG | OXYGEN SATURATION: 98 % | BODY MASS INDEX: 31.34 KG/M2 | WEIGHT: 195 LBS | SYSTOLIC BLOOD PRESSURE: 130 MMHG

## 2023-05-19 DIAGNOSIS — F41.9 ANXIETY: Primary | ICD-10-CM

## 2023-05-19 PROCEDURE — 3075F SYST BP GE 130 - 139MM HG: CPT | Performed by: INTERNAL MEDICINE

## 2023-05-19 PROCEDURE — 3078F DIAST BP <80 MM HG: CPT | Performed by: INTERNAL MEDICINE

## 2023-05-19 PROCEDURE — 99213 OFFICE O/P EST LOW 20 MIN: CPT | Performed by: INTERNAL MEDICINE

## 2023-05-19 RX ORDER — DULOXETIN HYDROCHLORIDE 30 MG/1
30 CAPSULE, DELAYED RELEASE ORAL DAILY
Qty: 30 CAPSULE | Refills: 5 | Status: SHIPPED | OUTPATIENT
Start: 2023-05-19

## 2023-05-19 NOTE — PROGRESS NOTES
"Subjective     Gina Gonzalez is a 73 y.o. female who presents with   Chief Complaint   Patient presents with   • Anxiety       History of Present Illness     C/o anxiety.  She is having a lot of stress with care of her  with dementia.   She is currently on Lexapro 10 mg daily and has been on this a while.  It used to be very helpful but know is less so.     Review of Systems   Respiratory: Negative.    Cardiovascular: Negative.        The following portions of the patient's history were reviewed and updated as appropriate: allergies, current medications and problem list.    Patient Active Problem List    Diagnosis Date Noted   • Cerebral meningioma 03/17/2021   • Anxiety 12/02/2020   • Essential hypertension 02/19/2020   • Other hyperlipidemia 02/22/2019   • Primary osteoarthritis of both hips 02/11/2019       Current Outpatient Medications on File Prior to Visit   Medication Sig Dispense Refill   • atorvastatin (LIPITOR) 20 MG tablet TAKE ONE TABLET BY MOUTH DAILY 90 tablet 3   • lisinopril (PRINIVIL,ZESTRIL) 10 MG tablet TAKE ONE TABLET BY MOUTH DAILY 90 tablet 1   • vitamin B-12 (VITAMIN B-12) 1000 MCG tablet Take 1 tablet by mouth Daily.     • [DISCONTINUED] escitalopram (LEXAPRO) 10 MG tablet TAKE ONE TABLET BY MOUTH DAILY 90 tablet 0     No current facility-administered medications on file prior to visit.       Objective     /72   Pulse 61   Ht 167.6 cm (65.98\")   Wt 88.5 kg (195 lb)   SpO2 98%   BMI 31.49 kg/m²     Physical Exam  Constitutional:       Appearance: She is well-developed.   HENT:      Head: Normocephalic and atraumatic.   Pulmonary:      Effort: Pulmonary effort is normal.   Neurological:      Mental Status: She is alert and oriented to person, place, and time.   Psychiatric:         Behavior: Behavior normal.         Assessment & Plan   Diagnoses and all orders for this visit:    1. Anxiety (Primary)    Other orders  -     DULoxetine (Cymbalta) 30 MG capsule; Take 1 capsule by " mouth Daily.  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  Trial of switching to Cymbalta.  Discussed with the patient onset on action.  The patient will let me know of any side effects from the medication.             Future Appointments   Date Time Provider Department Center   8/23/2023 11:00 AM [facility]   2/21/2024 10:00 AM [facility]   2/28/2024 11:15 AM Lisa Malone MD [facility]         Answers for HPI/ROS submitted by the patient on 5/15/2023  Please describe your symptoms.: Anxiety  Have you had these symptoms before?: Yes  How long have you been having these symptoms?: Greater than 2 weeks  What is the primary reason for your visit?: Other

## 2023-05-31 ENCOUNTER — TRANSCRIBE ORDERS (OUTPATIENT)
Dept: ADMINISTRATIVE | Facility: HOSPITAL | Age: 73
End: 2023-05-31

## 2023-05-31 DIAGNOSIS — Z12.31 VISIT FOR SCREENING MAMMOGRAM: Primary | ICD-10-CM

## 2023-06-19 RX ORDER — DULOXETIN HYDROCHLORIDE 30 MG/1
30 CAPSULE, DELAYED RELEASE ORAL DAILY
Qty: 30 CAPSULE | Refills: 5 | Status: SHIPPED | OUTPATIENT
Start: 2023-06-19

## 2023-06-19 NOTE — TELEPHONE ENCOUNTER
Caller: Gina Gonzalez    Relationship: Self    Best call back number: [phone]     Requested Prescriptions:   Requested Prescriptions     Pending Prescriptions Disp Refills    DULoxetine (Cymbalta) 30 MG capsule 30 capsule 5     Sig: Take 1 capsule by mouth Daily.        Pharmacy where request should be sent: [facility] [id number] [facility] [address] [facility] [facility] AT [facility] [phone] [facility] [phone] FX     Last office visit with prescribing clinician: 5/19/2023   Last telemedicine visit with prescribing clinician: Visit date not found   Next office visit with prescribing clinician: 2/28/2024     Additional details provided by patient: PATIENT STATED SHE IS OUT OF THIS MEDICATION AS OF 06-.    Does the patient have less than a 3 day supply:  [x] Yes  [] No    Would you like a call back once the refill request has been completed: [] Yes [x] No    Sander Peck   06/19/23 09:16 EDT

## 2023-08-18 RX ORDER — LISINOPRIL 10 MG/1
TABLET ORAL
Qty: 90 TABLET | Refills: 1 | Status: SHIPPED | OUTPATIENT
Start: 2023-08-18
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
## 2023-08-23 DIAGNOSIS — E78.49 OTHER HYPERLIPIDEMIA: Primary | ICD-10-CM

## 2023-08-23 DIAGNOSIS — I10 ESSENTIAL HYPERTENSION: ICD-10-CM

## 2023-08-23 LAB
ALBUMIN SERPL-MCNC: 4.4 G/DL (ref 3.5–5.2)
ALBUMIN/GLOB SERPL: 2 G/DL
ALP SERPL-CCNC: 77 U/L (ref 39–117)
ALT SERPL-CCNC: 20 U/L (ref 1–33)
AST SERPL-CCNC: 19 U/L (ref 1–32)
BASOPHILS # BLD AUTO: 0.04 10*3/MM3 (ref 0–0.2)
BASOPHILS NFR BLD AUTO: 0.8 % (ref 0–1.5)
BILIRUB SERPL-MCNC: 0.6 MG/DL (ref 0–1.2)
BUN SERPL-MCNC: 21 MG/DL (ref 8–23)
BUN/CREAT SERPL: 24.7 (ref 7–25)
CALCIUM SERPL-MCNC: 9.7 MG/DL (ref 8.6–10.5)
CHLORIDE SERPL-SCNC: 105 MMOL/L (ref 98–107)
CHOLEST SERPL-MCNC: 183 MG/DL (ref 0–200)
CO2 SERPL-SCNC: 25.3 MMOL/L (ref 22–29)
CREAT SERPL-MCNC: 0.85 MG/DL (ref 0.57–1)
EGFRCR SERPLBLD CKD-EPI 2021: 72.9 ML/MIN/1.73
EOSINOPHIL # BLD AUTO: 0.16 10*3/MM3 (ref 0–0.4)
EOSINOPHIL NFR BLD AUTO: 3.2 % (ref 0.3–6.2)
ERYTHROCYTE [DISTWIDTH] IN BLOOD BY AUTOMATED COUNT: 13.1 % (ref 12.3–15.4)
GLOBULIN SER CALC-MCNC: 2.2 GM/DL
GLUCOSE SERPL-MCNC: 92 MG/DL (ref 65–99)
HCT VFR BLD AUTO: 42 % (ref 34–46.6)
HDLC SERPL-MCNC: 88 MG/DL (ref 40–60)
HGB BLD-MCNC: 14.2 G/DL (ref 12–15.9)
IMM GRANULOCYTES # BLD AUTO: 0.03 10*3/MM3 (ref 0–0.05)
IMM GRANULOCYTES NFR BLD AUTO: 0.6 % (ref 0–0.5)
LDLC SERPL CALC-MCNC: 84 MG/DL (ref 0–100)
LYMPHOCYTES # BLD AUTO: 1.83 10*3/MM3 (ref 0.7–3.1)
LYMPHOCYTES NFR BLD AUTO: 36.4 % (ref 19.6–45.3)
MCH RBC QN AUTO: 30.7 PG (ref 26.6–33)
MCHC RBC AUTO-ENTMCNC: 33.8 G/DL (ref 31.5–35.7)
MCV RBC AUTO: 90.7 FL (ref 79–97)
MONOCYTES # BLD AUTO: 0.53 10*3/MM3 (ref 0.1–0.9)
MONOCYTES NFR BLD AUTO: 10.5 % (ref 5–12)
NEUTROPHILS # BLD AUTO: 2.44 10*3/MM3 (ref 1.7–7)
NEUTROPHILS NFR BLD AUTO: 48.5 % (ref 42.7–76)
NRBC BLD AUTO-RTO: 0 /100 WBC (ref 0–0.2)
PLATELET # BLD AUTO: 246 10*3/MM3 (ref 140–450)
POTASSIUM SERPL-SCNC: 4.4 MMOL/L (ref 3.5–5.2)
PROT SERPL-MCNC: 6.6 G/DL (ref 6–8.5)
RBC # BLD AUTO: 4.63 10*6/MM3 (ref 3.77–5.28)
SODIUM SERPL-SCNC: 141 MMOL/L (ref 136–145)
TRIGL SERPL-MCNC: 55 MG/DL (ref 0–150)
VLDLC SERPL CALC-MCNC: 11 MG/DL (ref 5–40)
WBC # BLD AUTO: 5.03 10*3/MM3 (ref 3.4–10.8)

## 2023-08-28 ENCOUNTER — TELEPHONE (OUTPATIENT)
Dept: INTERNAL MEDICINE | Facility: CLINIC | Age: 73
End: 2023-08-28

## 2023-08-28 NOTE — TELEPHONE ENCOUNTER
She was not able to come in today. She is not showing any sign of infection, finger is just sore. Advised her to watch for signs of infection and call if needed.
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
## 2024-01-12 RX ORDER — DULOXETIN HYDROCHLORIDE 30 MG/1
30 CAPSULE, DELAYED RELEASE ORAL DAILY
Qty: 30 CAPSULE | Refills: 5 | Status: SHIPPED | OUTPATIENT
Start: 2024-01-12

## 2024-01-12 NOTE — TELEPHONE ENCOUNTER
Caller: Gina Gonzalez    Relationship: Self    Best call back number:     Requested Prescriptions:   Requested Prescriptions     Pending Prescriptions Disp Refills    DULoxetine (Cymbalta) 30 MG capsule 30 capsule 5     Sig: Take 1 capsule by mouth Daily.        Pharmacy where request should be sent:  [facility] [address] [phone]    Last office visit with prescribing clinician: 5/19/2023   Last telemedicine visit with prescribing clinician: Visit date not found   Next office visit with prescribing clinician: 2/28/2024     Additional details provided by patient:     Does the patient have less than a 3 day supply:  [] Yes  [x] No    Would you like a call back once the refill request has been completed: [x] Yes [] No    If the office needs to give you a call back, can they leave a voicemail: [x] Yes [] No    Sander Kee Rep   01/12/24 12:10 EST

## 2024-01-19 ENCOUNTER — APPOINTMENT (OUTPATIENT)
Dept: CT IMAGING | Facility: HOSPITAL | Age: 74
End: 2024-01-19
Payer: MEDICARE

## 2024-01-19 ENCOUNTER — HOSPITAL ENCOUNTER (EMERGENCY)
Facility: HOSPITAL | Age: 74
Discharge: HOME | End: 2024-01-19
Attending: EMERGENCY MEDICINE
Payer: MEDICARE

## 2024-01-19 ENCOUNTER — APPOINTMENT (OUTPATIENT)
Dept: ULTRASOUND IMAGING | Facility: HOSPITAL | Age: 74
End: 2024-01-19
Payer: MEDICARE

## 2024-01-19 VITALS
BODY MASS INDEX: 31.34 KG/M2 | SYSTOLIC BLOOD PRESSURE: 154 MMHG | RESPIRATION RATE: 20 BRPM | HEIGHT: 66 IN | WEIGHT: 195 LBS | HEART RATE: 65 BPM | DIASTOLIC BLOOD PRESSURE: 84 MMHG | TEMPERATURE: 97.4 F | OXYGEN SATURATION: 95 %

## 2024-01-19 DIAGNOSIS — R10.9 FLANK PAIN: Primary | ICD-10-CM

## 2024-01-19 DIAGNOSIS — R10.13 EPIGASTRIC PAIN: ICD-10-CM

## 2024-01-19 LAB
ALBUMIN SERPL-MCNC: 4.4 G/DL (ref 3.5–5.2)
ALBUMIN/GLOB SERPL: 1.6 G/DL
ALP SERPL-CCNC: 88 U/L (ref 39–117)
ALT SERPL W P-5'-P-CCNC: 19 U/L (ref 1–33)
ANION GAP SERPL CALCULATED.3IONS-SCNC: 11.3 MMOL/L (ref 5–15)
AST SERPL-CCNC: 17 U/L (ref 1–32)
BACTERIA UR QL AUTO: ABNORMAL /HPF
BASOPHILS # BLD AUTO: 0.04 10*3/MM3 (ref 0–0.2)
BASOPHILS NFR BLD AUTO: 0.6 % (ref 0–1.5)
BILIRUB SERPL-MCNC: 0.6 MG/DL (ref 0–1.2)
BILIRUB UR QL STRIP: NEGATIVE
BUN SERPL-MCNC: 15 MG/DL (ref 8–23)
BUN/CREAT SERPL: 17 (ref 7–25)
CALCIUM SPEC-SCNC: 9.3 MG/DL (ref 8.6–10.5)
CHLORIDE SERPL-SCNC: 104 MMOL/L (ref 98–107)
CLARITY UR: CLEAR
CO2 SERPL-SCNC: 23.7 MMOL/L (ref 22–29)
COLOR UR: YELLOW
CREAT SERPL-MCNC: 0.88 MG/DL (ref 0.57–1)
DEPRECATED RDW RBC AUTO: 41 FL (ref 37–54)
EGFRCR SERPLBLD CKD-EPI 2021: 69.9 ML/MIN/1.73
EOSINOPHIL # BLD AUTO: 0.09 10*3/MM3 (ref 0–0.4)
EOSINOPHIL NFR BLD AUTO: 1.4 % (ref 0.3–6.2)
ERYTHROCYTE [DISTWIDTH] IN BLOOD BY AUTOMATED COUNT: 12.5 % (ref 12.3–15.4)
GLOBULIN UR ELPH-MCNC: 2.7 GM/DL
GLUCOSE SERPL-MCNC: 105 MG/DL (ref 65–99)
GLUCOSE UR STRIP-MCNC: NEGATIVE MG/DL
HCT VFR BLD AUTO: 44.4 % (ref 34–46.6)
HGB BLD-MCNC: 14.5 G/DL (ref 12–15.9)
HGB UR QL STRIP.AUTO: NEGATIVE
HYALINE CASTS UR QL AUTO: ABNORMAL /LPF
IMM GRANULOCYTES # BLD AUTO: 0.03 10*3/MM3 (ref 0–0.05)
IMM GRANULOCYTES NFR BLD AUTO: 0.5 % (ref 0–0.5)
KETONES UR QL STRIP: NEGATIVE
LEUKOCYTE ESTERASE UR QL STRIP.AUTO: ABNORMAL
LIPASE SERPL-CCNC: 22 U/L (ref 13–60)
LYMPHOCYTES # BLD AUTO: 2.1 10*3/MM3 (ref 0.7–3.1)
LYMPHOCYTES NFR BLD AUTO: 33.4 % (ref 19.6–45.3)
MCH RBC QN AUTO: 29.5 PG (ref 26.6–33)
MCHC RBC AUTO-ENTMCNC: 32.7 G/DL (ref 31.5–35.7)
MCV RBC AUTO: 90.4 FL (ref 79–97)
MONOCYTES # BLD AUTO: 0.51 10*3/MM3 (ref 0.1–0.9)
MONOCYTES NFR BLD AUTO: 8.1 % (ref 5–12)
NEUTROPHILS NFR BLD AUTO: 3.52 10*3/MM3 (ref 1.7–7)
NEUTROPHILS NFR BLD AUTO: 56 % (ref 42.7–76)
NITRITE UR QL STRIP: NEGATIVE
NRBC BLD AUTO-RTO: 0 /100 WBC (ref 0–0.2)
PH UR STRIP.AUTO: 6.5 [PH] (ref 5–8)
PLATELET # BLD AUTO: 275 10*3/MM3 (ref 140–450)
PMV BLD AUTO: 9 FL (ref 6–12)
POTASSIUM SERPL-SCNC: 3.6 MMOL/L (ref 3.5–5.2)
PROT SERPL-MCNC: 7.1 G/DL (ref 6–8.5)
PROT UR QL STRIP: NEGATIVE
RBC # BLD AUTO: 4.91 10*6/MM3 (ref 3.77–5.28)
RBC # UR STRIP: ABNORMAL /HPF
REF LAB TEST METHOD: ABNORMAL
SODIUM SERPL-SCNC: 139 MMOL/L (ref 136–145)
SP GR UR STRIP: 1.01 (ref 1–1.03)
SQUAMOUS #/AREA URNS HPF: ABNORMAL /HPF
UROBILINOGEN UR QL STRIP: ABNORMAL
WBC # UR STRIP: ABNORMAL /HPF
WBC NRBC COR # BLD AUTO: 6.29 10*3/MM3 (ref 3.4–10.8)

## 2024-01-19 PROCEDURE — 76705 ECHO EXAM OF ABDOMEN: CPT

## 2024-01-19 PROCEDURE — 25010000002 ONDANSETRON PER 1 MG: Performed by: EMERGENCY MEDICINE

## 2024-01-19 PROCEDURE — 96375 TX/PRO/DX INJ NEW DRUG ADDON: CPT

## 2024-01-19 PROCEDURE — 25010000002 KETOROLAC TROMETHAMINE PER 15 MG: Performed by: EMERGENCY MEDICINE

## 2024-01-19 PROCEDURE — 74176 CT ABD & PELVIS W/O CONTRAST: CPT

## 2024-01-19 PROCEDURE — 96374 THER/PROPH/DIAG INJ IV PUSH: CPT

## 2024-01-19 PROCEDURE — 83690 ASSAY OF LIPASE: CPT | Performed by: EMERGENCY MEDICINE

## 2024-01-19 PROCEDURE — 99284 EMERGENCY DEPT VISIT MOD MDM: CPT

## 2024-01-19 PROCEDURE — 80053 COMPREHEN METABOLIC PANEL: CPT | Performed by: EMERGENCY MEDICINE

## 2024-01-19 PROCEDURE — 81001 URINALYSIS AUTO W/SCOPE: CPT | Performed by: EMERGENCY MEDICINE

## 2024-01-19 PROCEDURE — 85025 COMPLETE CBC W/AUTO DIFF WBC: CPT | Performed by: EMERGENCY MEDICINE

## 2024-01-19 RX ORDER — DICYCLOMINE HCL 20 MG
20 TABLET ORAL EVERY 8 HOURS PRN
Qty: 20 TABLET | Refills: 0 | Status: SHIPPED | OUTPATIENT
Start: 2024-01-19

## 2024-01-19 RX ORDER — PANTOPRAZOLE SODIUM 40 MG/1
40 TABLET, DELAYED RELEASE ORAL DAILY
Qty: 30 TABLET | Refills: 0 | Status: SHIPPED | OUTPATIENT
Start: 2024-01-19 | End: 2024-02-18

## 2024-01-19 RX ORDER — SODIUM CHLORIDE 0.9 % (FLUSH) 0.9 %
10 SYRINGE (ML) INJECTION AS NEEDED
Status: DISCONTINUED | OUTPATIENT
Start: 2024-01-19 | End: 2024-01-19 | Stop reason: HOSPADM

## 2024-01-19 RX ORDER — MORPHINE SULFATE 2 MG/ML
4 INJECTION, SOLUTION INTRAMUSCULAR; INTRAVENOUS ONCE
Status: DISCONTINUED | OUTPATIENT
Start: 2024-01-19 | End: 2024-01-19 | Stop reason: HOSPADM

## 2024-01-19 RX ORDER — KETOROLAC TROMETHAMINE 15 MG/ML
10 INJECTION, SOLUTION INTRAMUSCULAR; INTRAVENOUS ONCE
Status: COMPLETED | OUTPATIENT
Start: 2024-01-19 | End: 2024-01-19

## 2024-01-19 RX ORDER — ONDANSETRON 2 MG/ML
4 INJECTION INTRAMUSCULAR; INTRAVENOUS ONCE
Status: COMPLETED | OUTPATIENT
Start: 2024-01-19 | End: 2024-01-19

## 2024-01-19 RX ADMIN — KETOROLAC TROMETHAMINE 10 MG: 15 INJECTION, SOLUTION INTRAMUSCULAR; INTRAVENOUS at 10:20

## 2024-01-19 RX ADMIN — ONDANSETRON HYDROCHLORIDE 4 MG: 2 INJECTION, SOLUTION INTRAMUSCULAR; INTRAVENOUS at 10:21

## 2024-01-19 NOTE — DISCHARGE INSTRUCTIONS
Recommend gentle, bland diet as tolerated.  Please return to the emergency room for any worsening pain, fevers, nausea, vomiting, difficulties breathing or any other concerns.

## 2024-01-19 NOTE — ED PROVIDER NOTES
EMERGENCY DEPARTMENT ENCOUNTER  Room Number:  35/35  PCP: Lisa Malone MD  Independent Historians: Patient      HPI:  Chief Complaint: had concerns including Flank Pain.  And right upper quadrant pain    A complete HPI/ROS/PMH/PSH/SH/FH are unobtainable due to: None    Chronic or social conditions impacting patient care (Social Determinants of Health): None      Context: Gina Gonzalez is a 73 y.o. female with a medical history of arthritis, hypertension and hyperlipidemia who presents to the ED c/o acute right flank and right upper quadrant abdomen pain.  Patient was at work yesterday morning, approximately 24 hours ago, when she began to experience this pain in the right flank area that wraps around to the right upper quadrant region.  She tried to continue with her work duties but could not do so because the pain is so distracting.  She ended up going home and resting.  She did not feel well for the entire day.  She had very little appetite.  She says she went to bed early and try to rest.  This morning when she woke up, she still had the pain in the same areas.  She tried to go to work and do her normal routines again but the pain was too burdensome once more.  So she came here for further evaluation.  She denies any vomiting.  She denies any diarrhea.  She denies any dysuria or hematuria or urinary frequency or urgency patterns.  She has not had any fevers.  She denies any flulike symptoms or cough or difficulties breathing.      Review of prior external notes (non-ED) -and- Review of prior external test results outside of this encounter: I reviewed the previous progress note from PCP on May 19, 2023 when she had follow-up care for anxiety with refill of Cymbalta prescription.      PAST MEDICAL HISTORY  Active Ambulatory Problems     Diagnosis Date Noted    Primary osteoarthritis of both hips 02/11/2019    Other hyperlipidemia 02/22/2019    Essential hypertension 02/19/2020    Anxiety 12/02/2020    Cerebral  meningioma 2021     Resolved Ambulatory Problems     Diagnosis Date Noted    Abnormal TSH 2019    Colon cancer screening 2019    TGA (transient global amnesia) 10/13/2020    Meningioma 10/20/2020    TGA (transient global amnesia) 10/13/2020     Past Medical History:   Diagnosis Date    Arthritis          PAST SURGICAL HISTORY  Past Surgical History:   Procedure Laterality Date    ABDOMINOPLASTY      BREAST BIOPSY      COLONOSCOPY N/A 2019    Procedure: COLONOSCOPY TO CECUM AND TI;  Surgeon: Cecilia Aviles MD;  Location: [facility] ENDOSCOPY;  Service: Gastroenterology    EYE SURGERY Bilateral     eye lift    MENISCECTOMY Right     TOTAL HIP ARTHROPLASTY Bilateral          FAMILY HISTORY  Family History   Problem Relation Age of Onset    Aneurysm Mother         cerebral    Lung cancer Father     Coronary artery disease Brother     Aortic aneurysm Brother          SOCIAL HISTORY  Social History     Socioeconomic History    Marital status:    Tobacco Use    Smoking status: Former     Types: Cigarettes     Quit date:      Years since quittin.0    Smokeless tobacco: Never   Vaping Use    Vaping Use: Never used   Substance and Sexual Activity    Alcohol use: Yes     Alcohol/week: 7.0 standard drinks of alcohol     Types: 7 Glasses of wine per week    Drug use: No    Sexual activity: Defer         ALLERGIES  Patient has no known allergies.      REVIEW OF SYSTEMS  Review of Systems  Included in HPI  All systems reviewed and negative except for those discussed in HPI.      PHYSICAL EXAM    I have reviewed the triage vital signs and nursing notes.    ED Triage Vitals [24 0945]   Temp Heart Rate Resp BP SpO2   97.4 °F (36.3 °C) 87 16 -- 97 %      Temp src Heart Rate Source Patient Position BP Location FiO2 (%)   Tympanic Monitor -- -- --       Physical Exam  GENERAL: alert, no acute distress but appears a little bit uncomfortable  SKIN: Warm, dry no diaphoresis  HENT: Normocephalic,  atraumatic  EYES: no scleral icterus, EOMI, normal conjunctiva  CV: regular rhythm, regular rate no murmurs  RESPIRATORY: normal effort, lungs clear to auscultation bilaterally  ABDOMEN: soft, nondistended, mild to moderate tenderness in the right upper quadrant region but no peritoneal features elicited.  MUSCULOSKELETAL: no deformity, no asymmetry present  NEURO: alert, moves all extremities, follows commands, no facial droop, speech is clear and articulate      NIH:                                                             LAB RESULTS  Recent Results (from the past 24 hour(s))   Comprehensive Metabolic Panel    Collection Time: 01/19/24 10:17 AM    Specimen: Blood   Result Value Ref Range    Glucose 105 (H) 65 - 99 mg/dL    BUN 15 8 - 23 mg/dL    Creatinine 0.88 0.57 - 1.00 mg/dL    Sodium 139 136 - 145 mmol/L    Potassium 3.6 3.5 - 5.2 mmol/L    Chloride 104 98 - 107 mmol/L    CO2 23.7 22.0 - 29.0 mmol/L    Calcium 9.3 8.6 - 10.5 mg/dL    Total Protein 7.1 6.0 - 8.5 g/dL    Albumin 4.4 3.5 - 5.2 g/dL    ALT (SGPT) 19 1 - 33 U/L    AST (SGOT) 17 1 - 32 U/L    Alkaline Phosphatase 88 39 - 117 U/L    Total Bilirubin 0.6 0.0 - 1.2 mg/dL    Globulin 2.7 gm/dL    A/G Ratio 1.6 g/dL    BUN/Creatinine Ratio 17.0 7.0 - 25.0    Anion Gap 11.3 5.0 - 15.0 mmol/L    eGFR 69.9 >60.0 mL/min/1.73   Lipase    Collection Time: 01/19/24 10:17 AM    Specimen: Blood   Result Value Ref Range    Lipase 22 13 - 60 U/L   CBC Auto Differential    Collection Time: 01/19/24 10:17 AM    Specimen: Blood   Result Value Ref Range    WBC 6.29 3.40 - 10.80 10*3/mm3    RBC 4.91 3.77 - 5.28 10*6/mm3    Hemoglobin 14.5 12.0 - 15.9 g/dL    Hematocrit 44.4 34.0 - 46.6 %    MCV 90.4 79.0 - 97.0 fL    MCH 29.5 26.6 - 33.0 pg    MCHC 32.7 31.5 - 35.7 g/dL    RDW 12.5 12.3 - 15.4 %    RDW-SD 41.0 37.0 - 54.0 fl    MPV 9.0 6.0 - 12.0 fL    Platelets 275 140 - 450 10*3/mm3    Neutrophil % 56.0 42.7 - 76.0 %    Lymphocyte % 33.4 19.6 - 45.3 %    Monocyte  % 8.1 5.0 - 12.0 %    Eosinophil % 1.4 0.3 - 6.2 %    Basophil % 0.6 0.0 - 1.5 %    Immature Grans % 0.5 0.0 - 0.5 %    Neutrophils, Absolute 3.52 1.70 - 7.00 10*3/mm3    Lymphocytes, Absolute 2.10 0.70 - 3.10 10*3/mm3    Monocytes, Absolute 0.51 0.10 - 0.90 10*3/mm3    Eosinophils, Absolute 0.09 0.00 - 0.40 10*3/mm3    Basophils, Absolute 0.04 0.00 - 0.20 10*3/mm3    Immature Grans, Absolute 0.03 0.00 - 0.05 10*3/mm3    nRBC 0.0 0.0 - 0.2 /100 WBC   Urinalysis With Culture If Indicated - Urine, Clean Catch    Collection Time: 01/19/24 10:18 AM    Specimen: Urine, Clean Catch   Result Value Ref Range    Color, UA Yellow Yellow, Straw    Appearance, UA Clear Clear    pH, UA 6.5 5.0 - 8.0    Specific Gravity, UA 1.006 1.005 - 1.030    Glucose, UA Negative Negative    Ketones, UA Negative Negative    Bilirubin, UA Negative Negative    Blood, UA Negative Negative    Protein, UA Negative Negative    Leuk Esterase, UA Moderate (2+) (A) Negative    Nitrite, UA Negative Negative    Urobilinogen, UA 0.2 E.U./dL 0.2 - 1.0 E.U./dL   Urinalysis, Microscopic Only - Urine, Clean Catch    Collection Time: 01/19/24 10:18 AM    Specimen: Urine, Clean Catch   Result Value Ref Range    RBC, UA 0-2 None Seen, 0-2 /HPF    WBC, UA 3-5 (A) None Seen, 0-2 /HPF    Bacteria, UA None Seen None Seen /HPF    Squamous Epithelial Cells, UA 3-6 (A) None Seen, 0-2 /HPF    Hyaline Casts, UA 0-2 None Seen /LPF    Methodology Automated Microscopy          RADIOLOGY  CT Abdomen Pelvis Without Contrast    Result Date: 1/19/2024  CT ABDOMEN AND PELVIS WITHOUT IV CONTRAST  HISTORY: Right-sided abdominal pain.  TECHNIQUE:  CT includes axial imaging from the lung bases to the trochanters without intravenous contrast and without use of oral contrast. Data reconstructed in coronal and sagittal planes. Radiation dose reduction techniques were utilized, including automated exposure control and exposure modulation based on body size.  COMPARISON: None  FINDINGS:  Lung bases appear clear with the exception of mild basilar atelectasis.  There is a 9 mm low-density lesion within the anterior aspect of the right lobe of the liver. A 1 cm low-density lesion is present in the posterior right lobe of the liver. There is a 6 mm low-density lesion medial segment left lobe of the liver, an 8 mm low-density lesion is present lateral segment left lobe of the liver. These are most likely small cysts or hemangiomas.  The spleen, pancreas, kidneys appear within normal limits. There is mild thickening of the left adrenal gland. Right adrenal gland appears normal. There is no bowel dilatation or evidence of bowel obstruction. There is no evidence for appendicitis. Bilateral total hip arthroplasties are present. There is no ascites or evidence for intra-abdominal abscess formation. Visualization of the lower pelvic structures is limited due to beam hardening artifact related to bilateral total hip arthroplasties.      1. No evidence for acute abnormality in the abdomen or pelvis. 2. Bilateral total hip arthroplasties. 3. Subcentimeter hepatic low-density foci are most likely small cysts or hemangiomas, though not possible to characterize due to their small size.  Radiation dose reduction techniques were utilized, including automated exposure control and exposure modulation based on body size.   This report was finalized on 1/19/2024 1:40 PM by Dr. Mp Allan M.D on Workstation: [id number]      US Gallbladder    Result Date: 1/19/2024  Examination: Abdominal sonogram  TECHNIQUE: Sonographic images of the abdomen were obtained  HISTORY: Right flank pain and right upper quadrant pain  COMPARISON: None available  FINDINGS: The pancreatic head, body, and proximal tail are normal. The liver is normal in appearance, without solid mass seen in visualized portions. There are hepatic cysts. The gallbladder is normal, without shadowing stone or mural edema seen. The common duct measures 5 mm in  its midportion. The portal vein is patent, with antegrade flow. The right kidney is normal in echogenicity, without hydronephrosis, measuring approximately 10.5 cm.      Hepatic cyst. No sonographic explanation detected for pain.  This report was finalized on 1/19/2024 11:29 AM by Dr. Aleks iJmenes M.D on Workstation: [id number]         MEDICATIONS GIVEN IN ER  Medications   sodium chloride 0.9 % flush 10 mL (has no administration in time range)   morphine injection 4 mg (4 mg Intravenous Not Given 1/19/24 1022)   ondansetron (ZOFRAN) injection 4 mg (4 mg Intravenous Given 1/19/24 1021)   ketorolac (TORADOL) injection 10 mg (10 mg Intravenous Given 1/19/24 1020)         ORDERS PLACED DURING THIS VISIT:  Orders Placed This Encounter   Procedures    CT Abdomen Pelvis Without Contrast    US Gallbladder    Comprehensive Metabolic Panel    Lipase    Urinalysis With Culture If Indicated - Urine, Clean Catch    CBC Auto Differential    Urinalysis, Microscopic Only - Urine, Clean Catch    Insert Peripheral IV    CBC & Differential         OUTPATIENT MEDICATION MANAGEMENT:  Current Facility-Administered Medications Ordered in Epic   Medication Dose Route Frequency Provider Last Rate Last Admin    morphine injection 4 mg  4 mg Intravenous Once Baljeet Bliss MD        sodium chloride 0.9 % flush 10 mL  10 mL Intravenous PRN Baljeet Bliss MD         Current Outpatient Medications Ordered in Epic   Medication Sig Dispense Refill    atorvastatin (LIPITOR) 20 MG tablet TAKE ONE TABLET BY MOUTH DAILY 90 tablet 3    dicyclomine (BENTYL) 20 MG tablet Take 1 tablet by mouth Every 8 (Eight) Hours As Needed (abdominal pain). 20 tablet 0    DULoxetine (Cymbalta) 30 MG capsule Take 1 capsule by mouth Daily. 30 capsule 5    escitalopram (LEXAPRO) 10 MG tablet TAKE ONE TABLET BY MOUTH DAILY 90 tablet 0    lisinopril (PRINIVIL,ZESTRIL) 10 MG tablet TAKE ONE TABLET BY MOUTH DAILY 90 tablet 1    pantoprazole (PROTONIX) 40 MG EC tablet  "Take 1 tablet by mouth Daily for 30 days. 30 tablet 0    vitamin B-12 (VITAMIN B-12) 1000 MCG tablet Take 1 tablet by mouth Daily.           PROCEDURES  Procedures            PROGRESS, DATA ANALYSIS, CONSULTS, AND MEDICAL DECISION MAKING  All labs have been independently interpreted by me.  All radiology studies have been reviewed by me. All EKG's have been independently viewed and interpreted by me.  Discussion below represents my analysis of pertinent findings related to patient's condition, differential diagnosis, treatment plan and final disposition.    Differential diagnosis includes but is not limited to pyelonephritis, kidney stone, biliary colic, cholecystitis, pancreatitis.    Clinical Scores:                  ED Course as of 01/19/24 1406   Fri Jan 19, 2024   1154 Leukocytes, UA(!): Moderate (2+) [FELISHA]   1154 I reviewed gallbladder ultrasound study which does not show any worrisome features to explain her right upper quadrant pain.  There are incidental hepatic cysts notable. [FELISHA]   1343 I independently interpreted the abdomen CT study and my findings are: No free air, no small bowel obstruction pattern   [FELISHA]   1404 I reviewed all the test results with the patient at the bedside.  She is resting comfortably now.  She says her pain seems to have moved into the \"stomach area\" at this time.  I do not suspect any kidney stone or kidney infection based on the CT scan and urinalysis findings at this time.  Similarly, there is no significant abnormality identified in the gallbladder ultrasound.  Rather, I wonder if her pain might possibly be due to gastritis or peptic ulcer disease.  She tells me she has been under quite a lot of stress recently because she is helping to care for her elderly  who has dementia.  She says he is particularly difficult to care for at home in the evening hours when his behavior changes.  She says that she will frequently have a glass of red wine in the evening.  I suspect some " "of these features might be contributing to her symptoms today.  Either way, I think she is safe to discharge home at this time.  Will encourage prompt follow-up with her PCP and explained that an outpatient referral to gastroenterology may be beneficial if her symptoms do not resolve on PPI therapy.  She is agreeable with this plan.  Will review with her the usual \"return to ER \"instructions prior to discharge. [FELISHA]      ED Course User Index  [FELISHA] Baljeet Bliss MD           AS OF 14:06 EST VITALS:    BP - 154/84  HR - 65  TEMP - 97.4 °F (36.3 °C) (Tympanic)  O2 SATS - 95%    COMPLEXITY OF CARE  Admission was considered but after careful review of the patient's presentation, physical examination, diagnostic results, and response to treatment the patient may be safely discharged with outpatient follow-up.      DIAGNOSIS  Final diagnoses:   Flank pain   Epigastric pain         DISPOSITION  ED Disposition       ED Disposition   Discharge    Condition   Stable    Comment   --                Please note that portions of this document were completed with a voice recognition program.    Note Disclaimer: At [facility], we believe that sharing information builds trust and better relationships. You are receiving this note because you recently visited [facility]. It is possible you will see health information before a provider has talked with you about it. This kind of information can be easy to misunderstand. To help you fully understand what it means for your health, we urge you to discuss this note with your provider.         Baljeet Bliss MD  01/19/24 1406    "

## 2024-01-20 ENCOUNTER — APPOINTMENT (OUTPATIENT)
Dept: GENERAL RADIOLOGY | Facility: HOSPITAL | Age: 74
End: 2024-01-20
Payer: MEDICARE

## 2024-01-20 ENCOUNTER — HOSPITAL ENCOUNTER (EMERGENCY)
Facility: HOSPITAL | Age: 74
Discharge: HOME | End: 2024-01-20
Attending: EMERGENCY MEDICINE
Payer: MEDICARE

## 2024-01-20 VITALS
DIASTOLIC BLOOD PRESSURE: 89 MMHG | SYSTOLIC BLOOD PRESSURE: 150 MMHG | RESPIRATION RATE: 16 BRPM | TEMPERATURE: 97.8 F | OXYGEN SATURATION: 95 % | HEART RATE: 78 BPM

## 2024-01-20 DIAGNOSIS — S42.92XA TRAUMATIC CLOSED DISPLACED FRACTURE OF LEFT SHOULDER WITH ANTERIOR DISLOCATION, INITIAL ENCOUNTER: Primary | ICD-10-CM

## 2024-01-20 DIAGNOSIS — S43.492A BANKART LESION OF LEFT SHOULDER, INITIAL ENCOUNTER: ICD-10-CM

## 2024-01-20 LAB
ANION GAP SERPL CALCULATED.3IONS-SCNC: 11 MMOL/L (ref 5–15)
BASOPHILS # BLD AUTO: 0.02 10*3/MM3 (ref 0–0.2)
BASOPHILS NFR BLD AUTO: 0.2 % (ref 0–1.5)
BUN SERPL-MCNC: 17 MG/DL (ref 8–23)
BUN/CREAT SERPL: 20.2 (ref 7–25)
CALCIUM SPEC-SCNC: 9.8 MG/DL (ref 8.6–10.5)
CHLORIDE SERPL-SCNC: 104 MMOL/L (ref 98–107)
CO2 SERPL-SCNC: 25 MMOL/L (ref 22–29)
CREAT SERPL-MCNC: 0.84 MG/DL (ref 0.57–1)
DEPRECATED RDW RBC AUTO: 43.7 FL (ref 37–54)
EGFRCR SERPLBLD CKD-EPI 2021: 73.9 ML/MIN/1.73
EOSINOPHIL # BLD AUTO: 0.04 10*3/MM3 (ref 0–0.4)
EOSINOPHIL NFR BLD AUTO: 0.4 % (ref 0.3–6.2)
ERYTHROCYTE [DISTWIDTH] IN BLOOD BY AUTOMATED COUNT: 12.7 % (ref 12.3–15.4)
GLUCOSE SERPL-MCNC: 105 MG/DL (ref 65–99)
HCT VFR BLD AUTO: 44.2 % (ref 34–46.6)
HGB BLD-MCNC: 14.6 G/DL (ref 12–15.9)
IMM GRANULOCYTES # BLD AUTO: 0.05 10*3/MM3 (ref 0–0.05)
IMM GRANULOCYTES NFR BLD AUTO: 0.5 % (ref 0–0.5)
LYMPHOCYTES # BLD AUTO: 1.22 10*3/MM3 (ref 0.7–3.1)
LYMPHOCYTES NFR BLD AUTO: 11 % (ref 19.6–45.3)
MCH RBC QN AUTO: 30.4 PG (ref 26.6–33)
MCHC RBC AUTO-ENTMCNC: 33 G/DL (ref 31.5–35.7)
MCV RBC AUTO: 92.1 FL (ref 79–97)
MONOCYTES # BLD AUTO: 0.87 10*3/MM3 (ref 0.1–0.9)
MONOCYTES NFR BLD AUTO: 7.9 % (ref 5–12)
NEUTROPHILS NFR BLD AUTO: 8.88 10*3/MM3 (ref 1.7–7)
NEUTROPHILS NFR BLD AUTO: 80 % (ref 42.7–76)
NRBC BLD AUTO-RTO: 0 /100 WBC (ref 0–0.2)
PLATELET # BLD AUTO: 255 10*3/MM3 (ref 140–450)
PMV BLD AUTO: 9 FL (ref 6–12)
POTASSIUM SERPL-SCNC: 4 MMOL/L (ref 3.5–5.2)
RBC # BLD AUTO: 4.8 10*6/MM3 (ref 3.77–5.28)
SODIUM SERPL-SCNC: 140 MMOL/L (ref 136–145)
WBC NRBC COR # BLD AUTO: 11.08 10*3/MM3 (ref 3.4–10.8)

## 2024-01-20 PROCEDURE — 96374 THER/PROPH/DIAG INJ IV PUSH: CPT

## 2024-01-20 PROCEDURE — 85025 COMPLETE CBC W/AUTO DIFF WBC: CPT | Performed by: EMERGENCY MEDICINE

## 2024-01-20 PROCEDURE — 99285 EMERGENCY DEPT VISIT HI MDM: CPT

## 2024-01-20 PROCEDURE — 80048 BASIC METABOLIC PNL TOTAL CA: CPT | Performed by: EMERGENCY MEDICINE

## 2024-01-20 PROCEDURE — 25010000002 PROPOFOL 10 MG/ML EMULSION: Performed by: EMERGENCY MEDICINE

## 2024-01-20 PROCEDURE — 73030 X-RAY EXAM OF SHOULDER: CPT

## 2024-01-20 PROCEDURE — 25010000002 LIDOCAINE 1 % SOLUTION: Performed by: EMERGENCY MEDICINE

## 2024-01-20 PROCEDURE — 25010000002 KETOROLAC TROMETHAMINE PER 15 MG: Performed by: EMERGENCY MEDICINE

## 2024-01-20 PROCEDURE — 73020 X-RAY EXAM OF SHOULDER: CPT

## 2024-01-20 RX ORDER — KETOROLAC TROMETHAMINE 15 MG/ML
15 INJECTION, SOLUTION INTRAMUSCULAR; INTRAVENOUS ONCE
Status: COMPLETED | OUTPATIENT
Start: 2024-01-20 | End: 2024-01-20

## 2024-01-20 RX ORDER — SODIUM CHLORIDE 0.9 % (FLUSH) 0.9 %
10 SYRINGE (ML) INJECTION AS NEEDED
Status: DISCONTINUED | OUTPATIENT
Start: 2024-01-20 | End: 2024-01-20 | Stop reason: HOSPADM

## 2024-01-20 RX ORDER — LIDOCAINE HYDROCHLORIDE 10 MG/ML
10 INJECTION, SOLUTION INFILTRATION; PERINEURAL ONCE
Status: COMPLETED | OUTPATIENT
Start: 2024-01-20 | End: 2024-01-20

## 2024-01-20 RX ORDER — PROPOFOL 10 MG/ML
VIAL (ML) INTRAVENOUS
Status: COMPLETED | OUTPATIENT
Start: 2024-01-20 | End: 2024-01-20

## 2024-01-20 RX ORDER — PROPOFOL 10 MG/ML
100 VIAL (ML) INTRAVENOUS ONCE
Status: COMPLETED | OUTPATIENT
Start: 2024-01-20 | End: 2024-01-20

## 2024-01-20 RX ADMIN — LIDOCAINE HYDROCHLORIDE 10 ML: 10 INJECTION, SOLUTION INFILTRATION; PERINEURAL at 17:02

## 2024-01-20 RX ADMIN — PROPOFOL 100 MG: 10 INJECTION, EMULSION INTRAVENOUS at 17:04

## 2024-01-20 RX ADMIN — PROPOFOL 20 MG: 10 INJECTION, EMULSION INTRAVENOUS at 16:35

## 2024-01-20 RX ADMIN — PROPOFOL 20 MG: 10 INJECTION, EMULSION INTRAVENOUS at 16:34

## 2024-01-20 RX ADMIN — PROPOFOL 60 MG: 10 INJECTION, EMULSION INTRAVENOUS at 16:29

## 2024-01-20 RX ADMIN — KETOROLAC TROMETHAMINE 15 MG: 15 INJECTION, SOLUTION INTRAMUSCULAR; INTRAVENOUS at 15:09

## 2024-01-20 NOTE — ED PROVIDER NOTES
EMERGENCY DEPARTMENT ENCOUNTER    Room Number:    PCP: Lisa Malone MD    HPI:  Chief Complaint: Left shoulder pain  A complete HPI/ROS/PMH/PSH/SH/FH are unobtainable due to: None  Context: Gina Gonzalez is a 73 y.o. female who presents to the ED c/o acute left shoulder pain.  She states that she fell yesterday try to get out of her car and slipped on ice.  Pain has been constant.  She presents here today because she did not want to go back to the ER yesterday after having been seen for back pain.        PAST MEDICAL HISTORY  Active Ambulatory Problems     Diagnosis Date Noted    Primary osteoarthritis of both hips 2019    Other hyperlipidemia 2019    Essential hypertension 2020    Anxiety 2020    Cerebral meningioma 2021     Resolved Ambulatory Problems     Diagnosis Date Noted    Abnormal TSH 2019    Colon cancer screening 2019    TGA (transient global amnesia) 10/13/2020    Meningioma 10/20/2020    TGA (transient global amnesia) 10/13/2020     Past Medical History:   Diagnosis Date    Arthritis          PAST SURGICAL HISTORY  Past Surgical History:   Procedure Laterality Date    ABDOMINOPLASTY      BREAST BIOPSY      COLONOSCOPY N/A 2019    Procedure: COLONOSCOPY TO CECUM AND TI;  Surgeon: Cecilia Aviles MD;  Location: [facility] ENDOSCOPY;  Service: Gastroenterology    EYE SURGERY Bilateral     eye lift    MENISCECTOMY Right     TOTAL HIP ARTHROPLASTY Bilateral          FAMILY HISTORY  Family History   Problem Relation Age of Onset    Aneurysm Mother         cerebral    Lung cancer Father     Coronary artery disease Brother     Aortic aneurysm Brother          SOCIAL HISTORY  Social History     Socioeconomic History    Marital status:    Tobacco Use    Smoking status: Former     Types: Cigarettes     Quit date: 1980     Years since quittin.0    Smokeless tobacco: Never   Vaping Use    Vaping Use: Never used   Substance and Sexual Activity     Alcohol use: Yes     Alcohol/week: 7.0 standard drinks of alcohol     Types: 7 Glasses of wine per week    Drug use: No    Sexual activity: Defer         ALLERGIES  Patient has no known allergies.        REVIEW OF SYSTEMS  Review of Systems     Included in HPI  All systems reviewed and negative except for those discussed in HPI.       PHYSICAL EXAM  ED Triage Vitals   Temp Heart Rate Resp BP SpO2   01/20/24 1348 01/20/24 1348 01/20/24 1348 01/20/24 1414 01/20/24 1348   97.8 °F (36.6 °C) 74 16 178/100 98 %      Temp src Heart Rate Source Patient Position BP Location FiO2 (%)   01/20/24 1348 01/20/24 1348 -- -- --   Tympanic Monitor          Physical Exam      GENERAL: no acute distress  HENT: nares patent  EYES: no scleral icterus  CV: regular rhythm, normal rate, 2+ left DP pulse  RESPIRATORY: normal effort  ABDOMEN: soft  MUSCULOSKELETAL: Left shoulder deformity consistent with dislocation  NEURO: alert, moves all extremities, follows commands intact motor and sensory left median/radial/ulnar nerves  PSYCH:  calm, cooperative  SKIN: warm, dry    Vital signs and nursing notes reviewed.          LAB RESULTS  Recent Results (from the past 24 hour(s))   Basic Metabolic Panel    Collection Time: 01/20/24  3:09 PM    Specimen: Blood   Result Value Ref Range    Glucose 105 (H) 65 - 99 mg/dL    BUN 17 8 - 23 mg/dL    Creatinine 0.84 0.57 - 1.00 mg/dL    Sodium 140 136 - 145 mmol/L    Potassium 4.0 3.5 - 5.2 mmol/L    Chloride 104 98 - 107 mmol/L    CO2 25.0 22.0 - 29.0 mmol/L    Calcium 9.8 8.6 - 10.5 mg/dL    BUN/Creatinine Ratio 20.2 7.0 - 25.0    Anion Gap 11.0 5.0 - 15.0 mmol/L    eGFR 73.9 >60.0 mL/min/1.73   CBC Auto Differential    Collection Time: 01/20/24  3:09 PM    Specimen: Blood   Result Value Ref Range    WBC 11.08 (H) 3.40 - 10.80 10*3/mm3    RBC 4.80 3.77 - 5.28 10*6/mm3    Hemoglobin 14.6 12.0 - 15.9 g/dL    Hematocrit 44.2 34.0 - 46.6 %    MCV 92.1 79.0 - 97.0 fL    MCH 30.4 26.6 - 33.0 pg    MCHC 33.0  31.5 - 35.7 g/dL    RDW 12.7 12.3 - 15.4 %    RDW-SD 43.7 37.0 - 54.0 fl    MPV 9.0 6.0 - 12.0 fL    Platelets 255 140 - 450 10*3/mm3    Neutrophil % 80.0 (H) 42.7 - 76.0 %    Lymphocyte % 11.0 (L) 19.6 - 45.3 %    Monocyte % 7.9 5.0 - 12.0 %    Eosinophil % 0.4 0.3 - 6.2 %    Basophil % 0.2 0.0 - 1.5 %    Immature Grans % 0.5 0.0 - 0.5 %    Neutrophils, Absolute 8.88 (H) 1.70 - 7.00 10*3/mm3    Lymphocytes, Absolute 1.22 0.70 - 3.10 10*3/mm3    Monocytes, Absolute 0.87 0.10 - 0.90 10*3/mm3    Eosinophils, Absolute 0.04 0.00 - 0.40 10*3/mm3    Basophils, Absolute 0.02 0.00 - 0.20 10*3/mm3    Immature Grans, Absolute 0.05 0.00 - 0.05 10*3/mm3    nRBC 0.0 0.0 - 0.2 /100 WBC       Ordered the above labs and reviewed the results.        RADIOLOGY  XR Shoulder 2+ View Left    Result Date: 1/20/2024  LEFT SHOULDER  TECHNIQUE:  AP and scapular Y views.  HISTORY: Dislocation.  COMPARISON: Left shoulder x-ray same day at 1:31 p.m.  FINDINGS: There is a persistent anterior shoulder dislocation.  There is also a fracture of the inferior glenoid as on the previous exam.      Persistent anterior left glenohumeral joint dislocation with inferior glenoid fracture.  This report was finalized on 1/20/2024 4:24 PM by Dr. Mp Allan M.D on Workstation: [id number]      XR Shoulder 2+ View Left    Result Date: 1/20/2024  LEFT SHOULDER: INTERNAL AND EXTERNAL ROTATION  HISTORY: Fall yesterday with injury.  COMPARISON: None.  FINDINGS: There is an anterior dislocation of the left humeral head. There is also cortical irregularity and lucency within the inferior glenoid suspected to represent a fracture/osseous Bankart injury. Mild AC joint arthritis is present.      Anterior shoulder dislocation with inferior glenoid fracture/osseous Bankart injury.  This report was finalized on 1/20/2024 1:45 PM by Dr. Mp Allan M.D on Workstation: [email or web address] [id number]       Ordered the above noted radiological studies. Reviewed by me in PACS.         MEDICATIONS GIVEN IN ER  Medications   sodium chloride 0.9 % flush 10 mL (has no administration in time range)   lidocaine (XYLOCAINE) 1 % injection 10 mL (has no administration in time range)   Propofol (DIPRIVAN) injection 100 mg (has no administration in time range)   ketorolac (TORADOL) injection 15 mg (15 mg Intravenous Given 1/20/24 1509)   Propofol (DIPRIVAN) injection (60 mg Intravenous Given 1/20/24 1629)   Propofol (DIPRIVAN) injection (20 mg Intravenous Given 1/20/24 1634)   Propofol (DIPRIVAN) injection (20 mg Intravenous Given 1/20/24 1635)         ORDERS PLACED DURING THIS VISIT:  Orders Placed This Encounter   Procedures    XR Shoulder 2+ View Left    XR Shoulder 1 View Left    Basic Metabolic Panel    CBC Auto Differential    Insert Peripheral IV    CBC & Differential         OUTPATIENT MEDICATION MANAGEMENT:  Current Facility-Administered Medications Ordered in Epic   Medication Dose Route Frequency Provider Last Rate Last Admin    lidocaine (XYLOCAINE) 1 % injection 10 mL  10 mL Injection Once Pal Jones II, MD        Propofol (DIPRIVAN) injection 100 mg  100 mg Intravenous Once Pal Jones II, MD        sodium chloride 0.9 % flush 10 mL  10 mL Intravenous PRN Pal Jones II, MD         Current Outpatient Medications Ordered in Epic   Medication Sig Dispense Refill    atorvastatin (LIPITOR) 20 MG tablet TAKE ONE TABLET BY MOUTH DAILY 90 tablet 3    dicyclomine (BENTYL) 20 MG tablet Take 1 tablet by mouth Every 8 (Eight) Hours As Needed (abdominal pain). 20 tablet 0    DULoxetine (Cymbalta) 30 MG capsule Take 1 capsule by mouth Daily. 30 capsule 5    escitalopram (LEXAPRO) 10 MG tablet TAKE ONE TABLET BY MOUTH DAILY 90 tablet 0    lisinopril (PRINIVIL,ZESTRIL) 10 MG tablet TAKE ONE TABLET BY MOUTH DAILY 90 tablet 1    pantoprazole (PROTONIX) 40 MG EC tablet Take 1 tablet by mouth Daily for 30 days. 30 tablet 0    vitamin B-12 (VITAMIN B-12) 1000 MCG tablet Take 1  tablet by mouth Daily.         PROCEDURES  Upper Extremity Dislocation    Date/Time: 1/20/2024 4:52 PM    Performed by: Pal Jones II, MD  Authorized by: Pal Jones II, MD  Injury location: shoulder  Location details: left shoulder  Injury type: dislocation  Dislocation type: anterior  Hill-Sachs deformity: no  Chronicity: new  Pre-procedure neurovascular assessment: neurovascularly intact  Pre-procedure distal perfusion: normal  Pre-procedure neurological function: normal  Pre-procedure range of motion: reduced  Anesthesia: local infiltration    Anesthesia:  Local anesthesia used: yes  Local Anesthetic: lidocaine 1% without epinephrine  Anesthetic total: 10 mL    Sedation:  Patient sedated: yes  Sedation type: moderate (conscious) sedation  Sedatives: propofol  Vitals: Vital signs were monitored during sedation.    Manipulation performed: yes  Reduction method: external rotation  Reduction successful: yes  X-ray confirmed reduction: yes  Immobilization: sling  Post-procedure neurovascular assessment: post-procedure neurovascularly intact  Post-procedure distal perfusion: normal  Post-procedure neurological function: normal  Post-procedure range of motion: normal      Procedural Sedation    Date/Time: 1/20/2024 4:54 PM    Performed by: Pal Jones II, MD  Authorized by: Pal Jones II, MD    Consent:     Consent obtained:  Written    Consent given by:  Patient    Risks discussed:  Dysrhythmia, inadequate sedation, prolonged hypoxia resulting in organ damage, prolonged sedation necessitating reversal, respiratory compromise necessitating ventilatory assistance and intubation, vomiting and nausea  Indications:     Procedure performed:  Dislocation reduction    Procedure necessitating sedation performed by:  Physician performing sedation    Intended level of sedation:  Moderate  Pre-sedation assessment:     ASA classification: class 2 - patient with mild systemic disease      Neck  mobility: normal    Immediate pre-procedure details:     Reassessment: Patient reassessed immediately prior to procedure      Reviewed: vital signs and relevant labs/tests      Verified: bag valve mask available, emergency equipment available, intubation equipment available, IV patency confirmed, oxygen available and suction available    Procedure details (see MAR for exact dosages):     Sedation start time:  1/20/2024 4:29 PM    Preoxygenation:  Nasal cannula    Sedation:  Propofol    Analgesia: toradol.    Intra-procedure events: none      Sedation end time:  1/20/2024 4:44 PM    Total sedation time (minutes):  16  Post-procedure details:     Post-sedation assessment completed:  1/20/2024 4:50 PM    Recovery: Patient returned to pre-procedure baseline      Patient is stable for discharge or admission: yes      Procedure completion:  Tolerated well, no immediate complications          MEDICAL DECISION MAKING, PROGRESS, and CONSULTS    Discussion below represents my analysis of pertinent findings related to patient's condition, differential diagnosis, treatment plan and final disposition.            Differential diagnosis:    Fracture, dislocation             Independent interpretation of labs, radiology studies, and discussions with consultants:  ED Course as of 01/20/24 1647   Sat Jan 20, 2024   1515 X-ray of the left shoulder independently interpreted by myself.  Anterior left shoulder dislocation with Bankart lesion [TD]   1618 Creatinine: 0.84 [TD]   1618 Sodium: 140 [TD]   1618 Potassium: 4.0 [TD]   1618 Hemoglobin: 14.6 [TD]   1618 Hematocrit: 44.2 [TD]   1644 On repeat x-ray of the left shoulder, successful reduction of the left shoulder.  Declines opioid pain medication for home.  Will use over-the-counter analgesics. [TD]      ED Course User Index  [TD] Pal Jones II, MD             DIAGNOSIS  Final diagnoses:   Traumatic closed displaced fracture of left shoulder with anterior dislocation,  initial encounter   Bankart lesion of left shoulder, initial encounter         DISPOSITION  DISCHARGE    FOLLOW-UP  Cy Ruth MD  [address]  [address]  [place] [address]  [phone]    Schedule an appointment as soon as possible for a visit in 1 week           Medication List      No changes were made to your prescriptions during this visit.             Latest Documented Vital Signs:  As of 16:47 EST  BP- 150/89 HR- 78 Temp- 97.8 °F (36.6 °C) (Tympanic) O2 sat- 95%      --    Please note that portions of this were completed with a voice recognition program.       Note Disclaimer: At [facility], we believe that sharing information builds trust and better relationships. You are receiving this note because you are receiving care at [facility] or recently visited. It is possible you will see health information before a provider has talked with you about it. This kind of information can be easy to misunderstand. To help you fully understand what it means for your health, we urge you to discuss this note with your provider.         Pal Jones II, MD  01/20/24 9546

## 2024-01-25 ENCOUNTER — TRANSCRIBE ORDERS (OUTPATIENT)
Dept: PHYSICAL THERAPY | Facility: CLINIC | Age: 74
End: 2024-01-25
Payer: MEDICARE

## 2024-01-25 ENCOUNTER — TREATMENT (OUTPATIENT)
Dept: PHYSICAL THERAPY | Facility: CLINIC | Age: 74
End: 2024-01-25
Payer: MEDICARE

## 2024-01-25 DIAGNOSIS — S43.005D UNSPECIFIED DISLOCATION OF LEFT SHOULDER JOINT, SUBSEQUENT ENCOUNTER: Primary | ICD-10-CM

## 2024-01-25 DIAGNOSIS — S43.005A UNSPECIFIED DISLOCATION OF LEFT SHOULDER JOINT, INITIAL ENCOUNTER: Primary | ICD-10-CM

## 2024-01-25 NOTE — PROGRESS NOTES
[facility]    Physical Therapy Initial Evaluation and Plan of Care    Patient Name: Gina Gonzalez          :  1950  Referring Physician: Channing Reeves MD  Diagnosis: Unspecified dislocation of left shoulder joint, subsequent encounter [S43.005D]    Date of Evaluation: 2024  ______________________________________________________________________    Subjective Evaluation    History of Present Illness  Date of onset: 2024  Mechanism of injury: Slipped on ice and dislocated (L) shoulder - Getting out of car - put foot on driveway and fell on ice - Fell stomach w/ both arms OH - Crawled in grass, pulled self up and went to [facility] the next day -   Told her shoulder was dislocated - bone chip, but no fx (per Pt)   To UC then to ER - Relocated shoulder in ER -   Saw Dr. Reeves today - Ordered a MRI to assess RTC -   MRI 2024;     NOW: Aches; Weakness when tested at [facility]       H/O RTC Tear (Not repaired) ;  Torn bicep tendons (B) shoulders (4 yrs ago most recent (R) shoulder) ; (L) At work lifting heavy wet towels -     Pain  Current pain rating: 3  At worst pain rating: 10  Location: (LO Shoulder and UT/Scap region  Quality: Ache - sharp.  Alleviating factors: Sling, rest.  Exacerbated by: Movement of (L) Shoulder, Supine w/o support, reaching/lifting, SL (L) -  Progression: no change    Social Support  Patient lives at: Home w/ stairs to basement - where computer is -  Lives with:  w/ stage 6 Dementia? - Needs total care -    Diagnostic Tests  X-ray: abnormal    Treatments  Current treatment: immobilization and medication  Current treatment comments: UC, ER, Ortho([facility]).   Patient Goals  Patient/family treatment goals: Pain alleviation; ROM, strength to allow ADLs, normal job and hobby activities -       Xrays Results  FINDINGS: There is an anterior dislocation of the left humeral head.There is also cortical irregularity and lucency within the inferiorglenoid suspected to represent a  fracture/osseous Bankart injury. MildAC joint arthritis is present.IMPRESSION:Anterior shoulder dislocation with inferior glenoidfracture/osseous Bankart injury.This report was finalized on 1/20/2024 1:45 PM by Dr. Mp Allan M.D on Workstation: [id number] by: Mp Allan MD on 1/20/2024  1:45 PMPATIENT: ELODIA DEXETR, [id number], Female, 73   ___________________________________________________  Objective          Observations     Additional Shoulder Observation Details  Pt presents with (L) arm in SLING w/ IR/ADDuction -    Bruising in axilla region (L)   No obvious deformity noted -     Tenderness     Additional Tenderness Details  Tender anterior shoulder (L) - No palpable sulcus sign -     Active Range of Motion     Additional Active Range of Motion Details  (L) Shoulder NA;  (L) Elbow, wrist, forearm, hand WNL  (R) SHOULDER WF/NL    Passive Range of Motion     Additional Passive Range of Motion Details  (L) Shoulder:  FE : 90-deg (did not exceed due to recent dislocation)                        ER/ERS; 20-deg before pain;                           ERA:  NA                        DARLENE:   NA     Strength/Myotome Testing     Right Shoulder   Normal muscle strength    Additional Strength Details  (L) Shoulder: ABD Grossly weak w/ resisted ABD at side;       ERS: 3-/5 ;  IRS 3+/5  (L) Biceps / Triceps 4+/5    Tests     Additional Tests Details  NA due to recent dislocation -       TREATMENT:   MANUAL THERAPY  Gentle assisted PROM (L) Shoulder FE to 90-deg and ER(POS) to 20-deg;     EXERCISES:   AROM Elbow/Forearm/Wrist x 15 ea  SELF PROM FE (using opp UE) to 90-deg; x 10 / 5 sec ea  Self PROM ER(POS) w/ Dowel Alex (TOWEL ROLL UNDER ARM) x 15/5 sec ea  SCAPULAR RETRACTION 15/5 sec ea  FABRICATED HEP and REVIEWED w/ PATIENT -       FUNCTIONAL ACTIVITIES:   TAPING / BRACING: NA  Anatomy / Dysfunction education -   Education on preventing future dislocations -   Education on positioning w/ towel roll under  arm when supine to prevent shoulder HE and increased pain -   Jt protection, ADL modification; Posture and       SPADI: 125  ___________________________________________________  Assessment & Plan       Assessment  Impairments: abnormal muscle firing, abnormal muscle tone, abnormal or restricted ROM, activity intolerance, impaired physical strength, lacks appropriate home exercise program, pain with function and weight-bearing intolerance   Functional limitations: carrying objects, lifting, sleeping, uncomfortable because of pain, moving in bed, reaching behind back, reaching overhead and unable to perform repetitive tasks   Assessment details: 72 yo female who suffered a traumatic ant/inf (L) shoulder dislocation on 1/19/2024 falling on the ice - RE-located in ER - Significant RTC weakness - To have MRI to r.o RTC tear .  GOALS:   SHORT TERM GOALS: 4-6 weeks:    1) HEP Initiated to facilitate ROM and improved function -prevent dislocation   2) Pain decreased 50%: to allow improved ROM / ADLs  3) AROM  increased: (L) shoulder FE 90-deg to allow improved ADL including reaching for items, self care, etc.   4) Pt (I) w/ precautions and sleeping positioning to prevent re-dislocation -     LONG TERM GOALS: 8-12 weeks (or at time of DISCHARGE):   1) (I) HEP for ROM, strengthening, etc to facilitate improved ADLs and hobby activities including reaching/lifting, dressing, etc.    2) AROM WFL and pain free to allow ADLs and hobby activities including lifting items of wt for cooking, putting / reaching items on upper shelf, dressing, hair care, etc. ;   3) Strength / mobility to be able to perform all ADL's and hobby-related activities w/o restrictions;   4) SPADI Functional Test score improved from 125 to < 72-     -   Prognosis: fair    Plan  Therapy options: will be seen for skilled therapy services  Planned therapy interventions: ADL retraining, flexibility, home exercise program, neuromuscular re-education,  postural training, strengthening, stretching and therapeutic activities (Modalities prn; Taping / bracing prn)  Frequency: 2x week  Duration in weeks: 12  Treatment plan discussed with: patient  Plan details: Pt to have MRI in near future ot r/o RTC tear(s) -       ___________________________________________________  Manual Therapy:    15     mins  04444;  Therapeutic Exercise:    15     mins  91773;     Neuromuscular Jeremi:    05    mins  18519;    Therapeutic Activity:     15     mins  81080;   Self Care:                           mins  67569  Ultrasound:          mins  55742;  Iontophoresis:          mins  53032;    Electrical Stimulation:         mins  05885 ( );  Mechanical Traction:          mins  46667  Dry Needling          mins self-pay    Eval:   15   mins    Timed Treatment:   50   mins                  Total Treatment:     65   mins    PT SIGNATURE:     Carlito Conde PT  DATE TREATMENT INITIATED: 1/29/2024  ___________________________________________________  Initial Certification  Certification Period: 4/28/2024  I certify that the therapy services are furnished while this patient is under my care.  The services outlined above are required by this patient, and will be reviewed every 90 days.     PHYSICIAN: ________________________________  DATE: ______  Channing Reeves MD        Please sign and return via fax to [phone].. Thank you, [facility] Physical Therapy.  ______________________________________________________________________  [address] [place] [address]  Phone: [phone] Fax: [phone]

## 2024-01-30 ENCOUNTER — TREATMENT (OUTPATIENT)
Dept: PHYSICAL THERAPY | Facility: CLINIC | Age: 74
End: 2024-01-30
Payer: MEDICARE

## 2024-01-30 DIAGNOSIS — R29.898 LOSS OF MOVEMENT: ICD-10-CM

## 2024-01-30 DIAGNOSIS — G89.29 CHRONIC LEFT SHOULDER PAIN: Primary | ICD-10-CM

## 2024-01-30 DIAGNOSIS — M25.512 CHRONIC LEFT SHOULDER PAIN: Primary | ICD-10-CM

## 2024-01-30 NOTE — PROGRESS NOTES
Physical Therapy Daily Treatment Note    [facility]  [address] [place] [address]  [phone] (phone)  [phone] (fax)    Patient: Gina Gonzalez   : 1950  Diagnosis/ICD-10 Code:  Chronic left shoulder pain [M25.512, G89.29]  Referring practitioner: Channing Reeves MD  Today's Date: 2024  Patient seen for 2 sessions    Visit Diagnoses:    ICD-10-CM ICD-9-CM   1. Chronic left shoulder pain  M25.512 719.41    G89.29 338.29   2. Loss of movement  R29.898 344.9              Subjective: Carola stated that her L shoulder is feeling better.  She has not been doing her exercises as much recently due to having to take care of her older 's soft tissue injuries to a forearm lesion.     Objective     Treatment    Manual therapy:  In supine, L arm supported by a towel roll, and legs on bolster.  PROM of the L shoulder to include abduction, flexion, ER (L upper arm supported by roll) and the scaption plane.    Therapeutic exercise  AAROM, wand L shoulder ER, upper arm on roll at about 15 degrees of abduction, x 20  AAROM, L shoulder flexion x 20  Standing retraction, B x 15  Standing retraction with depression, B, x 15  5. Ball squeezes x 10,     NMR: verbal and tactile cues for exercise technique.     CP to the L shoulder x 5 minutes    Self care: I added ball squeezes and progressed scapular re-ed by adding the retraction with depression to her HEP    Assessment & Plan       Assessment  Assessment details: Carola had greater ROM and improved tolerance to exercise today.  She required verbal/tactile cues for exercise technique and progressed her HEP.     Plan  Plan details: Continue progressing as indicated.  She is to have an MRI soon for further understanding of the issue.                Timed:    Manual Therapy:    10     mins  83777;  Therapeutic Exercise:    23     mins  73552;     Neuromuscular Jeremi:    5    mins  82395;    Therapeutic Activity:           mins  92106;     Gait Training:           mins  31138;     Ultrasound:          mins  46768;    Aquatic Therapy         mins 56001;  Self Care                       2     mins   82020        Untimed:  Electrical Stimulation:         mins  34931 ( );  Traction:         mins  97706;   Dry Needling  (1-2 muscles)                 mins 20560 (Self-pay)  Dry Needling (3-4 muscles)      20561 (Self-pay)  Dry Needling Trial         DRYNDLTRIAL  (No Charge)    Timed Treatment:   40   mins   Total Treatment:     45   mins    Karthikeyan Perez PT  Physical Therapist    [place] License:[id number]

## 2024-02-01 ENCOUNTER — TREATMENT (OUTPATIENT)
Dept: PHYSICAL THERAPY | Facility: CLINIC | Age: 74
End: 2024-02-01
Payer: MEDICARE

## 2024-02-01 DIAGNOSIS — R29.898 LOSS OF MOVEMENT: ICD-10-CM

## 2024-02-01 DIAGNOSIS — G89.29 CHRONIC LEFT SHOULDER PAIN: Primary | ICD-10-CM

## 2024-02-01 DIAGNOSIS — S43.005D UNSPECIFIED DISLOCATION OF LEFT SHOULDER JOINT, SUBSEQUENT ENCOUNTER: ICD-10-CM

## 2024-02-01 DIAGNOSIS — M25.512 CHRONIC LEFT SHOULDER PAIN: Primary | ICD-10-CM

## 2024-02-01 NOTE — PROGRESS NOTES
Physical Therapy Daily Treatment Note    [facility]  [address] [place] [address]  [phone] (phone)  [phone] (fax)    Patient: Gina Gonzalez   : 1950  Diagnosis/ICD-10 Code:  Chronic left shoulder pain [M25.512, G89.29]  Referring practitioner: Channing Reeves MD  Today's Date: 2024  Patient seen for 3 sessions    Visit Diagnoses:    ICD-10-CM ICD-9-CM   1. Chronic left shoulder pain  M25.512 719.41    G89.29 338.29   2. Loss of movement  R29.898 344.9   3. Unspecified dislocation of left shoulder joint, subsequent encounter  S43.005D V58.89              Subjective:  Carola reports that her 's R arm's wound/lesion is requiring wound care professionally, and that takes some of the care time from her shoulder.  She did say that she did her exercises at home, partially and did not use cryotherapy as suggested.      Objective     Treatment     Manual therapy:  In supine, L arm supported by a towel roll, and legs on bolster.  PROM of the L shoulder to include abduction, flexion, ER (L upper arm supported by roll) and the scaption plane.     Therapeutic exercise  AAROM, wand L shoulder ER, upper arm on roll at about 25 degrees of abduction, x 20  AAROM, L shoulder flexion x 20  Standing retraction, B x 15  Standing retraction with depression, B, x 15  5. Towel  squeezes x 10,     NMR: Verbal cues for exercise technique and intensity of each exercise    Self care: I suggested to Carola that squeezing a rolled towel would be better than a hard baseball from her .     Assessment & Plan       Assessment  Assessment details: Carola required verbal cues for exercise technique.  Her 's health issues seem to be interfering with her ability to do self care appropriately.  I was able to move her 25 degrees away from the torso for the AAROM ER with wand with a roll under the L upper arm.     Plan  Plan details: Continue per treatment  plan.               Timed:    Manual Therapy:    10     mins  56460;  Therapeutic Exercise:    30     mins  24598;     Neuromuscular Jeremi:    5    mins  49375;    Therapeutic Activity:          mins  00593;     Gait Training:           mins  13431;     Ultrasound:          mins  34607;    Aquatic Therapy         mins 74464;  Self Care                            mins   82314        Untimed:  Electrical Stimulation:         mins  53616 ( );  Traction:         mins  66242;   Dry Needling  (1-2 muscles)                 mins 20560 (Self-pay)  Dry Needling (3-4 muscles)      20561 (Self-pay)  Dry Needling Trial         DRYNDLTRIAL  (No Charge)    Timed Treatment:   45   mins   Total Treatment:     45   mins    Karthikeyan Perez PT  Physical Therapist    [place] License:[id number]

## 2024-02-12 RX ORDER — LISINOPRIL 10 MG/1
10 TABLET ORAL DAILY
Qty: 90 TABLET | Refills: 1 | Status: SHIPPED | OUTPATIENT
Start: 2024-02-12

## 2024-02-19 ENCOUNTER — HOSPITAL ENCOUNTER (OUTPATIENT)
Dept: GENERAL RADIOLOGY | Facility: HOSPITAL | Age: 74
Discharge: HOME | End: 2024-02-19
Payer: MEDICARE

## 2024-02-19 ENCOUNTER — PRE-ADMISSION TESTING (OUTPATIENT)
Dept: PREADMISSION TESTING | Facility: HOSPITAL | Age: 74
End: 2024-02-19
Payer: MEDICARE

## 2024-02-19 ENCOUNTER — TELEPHONE (OUTPATIENT)
Dept: ORTHOPEDIC SURGERY | Facility: HOSPITAL | Age: 74
End: 2024-02-19
Payer: MEDICARE

## 2024-02-19 VITALS
OXYGEN SATURATION: 98 % | HEIGHT: 66 IN | RESPIRATION RATE: 16 BRPM | WEIGHT: 192.4 LBS | TEMPERATURE: 97.9 F | SYSTOLIC BLOOD PRESSURE: 154 MMHG | HEART RATE: 73 BPM | BODY MASS INDEX: 30.92 KG/M2 | DIASTOLIC BLOOD PRESSURE: 94 MMHG

## 2024-02-19 LAB
ALBUMIN SERPL-MCNC: 4.3 G/DL (ref 3.5–5.2)
ALBUMIN/GLOB SERPL: 1.7 G/DL
ALP SERPL-CCNC: 87 U/L (ref 39–117)
ALT SERPL W P-5'-P-CCNC: 21 U/L (ref 1–33)
ANION GAP SERPL CALCULATED.3IONS-SCNC: 11.8 MMOL/L (ref 5–15)
AST SERPL-CCNC: 22 U/L (ref 1–32)
BILIRUB SERPL-MCNC: 0.8 MG/DL (ref 0–1.2)
BUN SERPL-MCNC: 23 MG/DL (ref 8–23)
BUN/CREAT SERPL: 27.4 (ref 7–25)
CALCIUM SPEC-SCNC: 9.4 MG/DL (ref 8.6–10.5)
CHLORIDE SERPL-SCNC: 104 MMOL/L (ref 98–107)
CO2 SERPL-SCNC: 24.2 MMOL/L (ref 22–29)
CREAT SERPL-MCNC: 0.84 MG/DL (ref 0.57–1)
DEPRECATED RDW RBC AUTO: 44.1 FL (ref 37–54)
EGFRCR SERPLBLD CKD-EPI 2021: 73.5 ML/MIN/1.73
ERYTHROCYTE [DISTWIDTH] IN BLOOD BY AUTOMATED COUNT: 13.1 % (ref 12.3–15.4)
GLOBULIN UR ELPH-MCNC: 2.5 GM/DL
GLUCOSE SERPL-MCNC: 90 MG/DL (ref 65–99)
HCT VFR BLD AUTO: 42.6 % (ref 34–46.6)
HGB BLD-MCNC: 14.3 G/DL (ref 12–15.9)
MCH RBC QN AUTO: 30.9 PG (ref 26.6–33)
MCHC RBC AUTO-ENTMCNC: 33.6 G/DL (ref 31.5–35.7)
MCV RBC AUTO: 92 FL (ref 79–97)
PLATELET # BLD AUTO: 258 10*3/MM3 (ref 140–450)
PMV BLD AUTO: 9 FL (ref 6–12)
POTASSIUM SERPL-SCNC: 4.2 MMOL/L (ref 3.5–5.2)
PROT SERPL-MCNC: 6.8 G/DL (ref 6–8.5)
RBC # BLD AUTO: 4.63 10*6/MM3 (ref 3.77–5.28)
SODIUM SERPL-SCNC: 140 MMOL/L (ref 136–145)
WBC NRBC COR # BLD AUTO: 5.27 10*3/MM3 (ref 3.4–10.8)

## 2024-02-19 PROCEDURE — 80053 COMPREHEN METABOLIC PANEL: CPT

## 2024-02-19 PROCEDURE — 71046 X-RAY EXAM CHEST 2 VIEWS: CPT

## 2024-02-19 PROCEDURE — 36415 COLL VENOUS BLD VENIPUNCTURE: CPT

## 2024-02-19 PROCEDURE — 93010 ELECTROCARDIOGRAM REPORT: CPT | Performed by: INTERNAL MEDICINE

## 2024-02-19 PROCEDURE — 93005 ELECTROCARDIOGRAM TRACING: CPT

## 2024-02-19 PROCEDURE — 85027 COMPLETE CBC AUTOMATED: CPT

## 2024-02-19 PROCEDURE — 73030 X-RAY EXAM OF SHOULDER: CPT

## 2024-02-19 RX ORDER — MELATONIN
1000 DAILY
COMMUNITY

## 2024-02-19 NOTE — TELEPHONE ENCOUNTER
Risk Factor yes no   Age >75  X   BMI <20 >40  X   Patient History     Chronic Pain (2 or more meds/Pain Management)  X   ETOH (more than 3 drinks Daily)  X   Uncontrolled Depression/Bipolar/Schizoaffective Disorder  X   Arrhythmias--  X   Stent placement/MI  X   DVT/PE  X   Pacemaker  X   HTN (uncontrolled or requiring more than 2 medications)  X   CHF/Retained fluids/Edema  X   Stroke with Residual   X   COPD/Asthma  X   RICO--Non-compliant with CPAP  X   Diabetes (on insulin or more than 2 meds)         A1C:  X   BPH/Urinary retention (on medication)  X   CKD  X   Home environment and support     Current ambulation status (use of cane, walker, W/C, Multiple falls/weakness)  X   Stairs to enter and throughout home X    Lives Alone  X   Doesn't have support at home  X   Outpatient Screening Assessment    Home needs: (Walker/BSC):  Total Shoulder   ? Steps 5 steps in   Caregiver 24-48hrs post-discharge:  Sister is going to be with her the night of surgery     Discharge Plan:   Total Shoulder    Prescriptions: Meds to bed    Home medications:   [] Blood thinner/anti-coag therapy--   [] BPH or diuretic--  ? BP meds-- Lisinopril   [] Pain/Anti-inflammatories--  Pre-op Education:  Educate patient on spinal anesthesia/pain control:  ? patient verbalize understanding    Educate patient on hospital course/timeline:  ?  patient verbalize understanding    Joint Care Class:  ?  yes [] no  Notes:   Does have a H/O PONV--note placed in chart  Patient would like her sister called prior to her arrival in Phase II to be there for OT Teaching   is not able to help patient in recovery process  Insurance did not cover SCD machine, patient will not be able to navigate THIAGO hose, note sent to MD regarding possible alternative.

## 2024-02-19 NOTE — DISCHARGE INSTRUCTIONS
Take the following medications the morning of surgery: DULOXETINE, PANTOPRAZOLE, HOLD LISINOPRIL 24 HOURS PRIOR TO SURGERY      If you are on prescription narcotic pain medication to control your pain you may also take that medication the morning of surgery.    General Instructions:  Do not eat solid food after midnight the night before surgery.  You may drink clear liquids day of surgery but must stop at least one hour before your hospital arrival time.  It is beneficial for you to have a clear drink that contains carbohydrates the day of surgery.  We suggest a 12 to 20 ounce bottle of Gatorade or Powerade for non-diabetic patients or a 12 to 20 ounce bottle of G2 or Powerade Zero for diabetic patients.     Clear liquids are liquids you can see through.  Nothing red in color.     Plain water                               Sports drinks  Sodas                                   Gelatin (Jell-O)  Fruit juices without pulp such as white grape juice and apple juice  Popsicles that contain no fruit or yogurt  Tea or coffee (no cream or milk added)  Gatorade / Powerade  G2 / Powerade Zero    Patients who avoid smoking, chewing tobacco and alcohol for 4 weeks prior to surgery have a reduced risk of post-operative complications.  Quit smoking as many days before surgery as you can.  Do not smoke, use chewing tobacco or drink alcohol the day of surgery.   If applicable bring your C-PAP/ BI-PAP machine in with you to preop day of surgery.  Bring any papers given to you in the doctor’s office.  Wear clean comfortable clothes.  Do not wear contact lenses, false eyelashes or make-up.  Bring a case for your glasses.   Remove all piercings.  Leave jewelry and any other valuables at home.  The Pre-Admission Testing nurse will instruct you to bring medications if unable to obtain an accurate list in Pre-Admission Testing.            Day of surgery:  Your arrival time is approximately two hours before your scheduled surgery time.   Upon arrival, a Pre-op nurse and Anesthesiologist will review your health history, obtain vital signs, and answer questions you may have.  The only belongings needed at this time will be a list of your home medications and if applicable your C-PAP/BI-PAP machine.  A Pre-op nurse will start an IV and you may receive medication in preparation for surgery, including something to help you relax.     Please be aware that surgery does come with discomfort.  We want to make every effort to control your discomfort so please discuss any uncontrolled symptoms with your nurse.   Your doctor will most likely have prescribed pain medications.      If you are going home after surgery you will receive individualized written care instructions before being discharged.  A responsible adult must drive you to and from the hospital on the day of your surgery and ideally stay with you through the night.  Discharge prescriptions can be filled by the hospital pharmacy during regular pharmacy hours.  If you are having surgery late in the day/evening your prescription may be e-prescribed to your pharmacy.  Please verify your pharmacy hours or chose a 24 hour pharmacy to avoid not having access to your prescription because your pharmacy has closed for the day.    If you are staying overnight following surgery, you will be transported to your hospital room following the recovery period.  [facility] has all private rooms.    If you have any questions please call Pre-Admission Testing at [phone].  Deductibles and co-payments are collected on the day of service. Please be prepared to pay the required co-pay, deductible or deposit on the day of service as defined by your plan.    Call your surgeon immediately if you experience any of the following symptoms:  Sore Throat  Shortness of Breath or difficulty breathing  Cough  Chills  Body soreness or muscle pain  Headache  Fever  New loss of taste or smell  Do not arrive for your  surgery ill.  Your procedure will need to be rescheduled to another time.  You will need to call your physician before the day of surgery to avoid any unnecessary exposure to hospital staff as well as other patients.        PREVENTING INFECTION IN SHOULDER SURGICAL SITES     C. acnes is a bacteria that lives deep within follicles and pores of the skin. It is found in large numbers on the skin of the face, axilla (armpit), chest and back and is the primary bacteria to cause a surgical site infection after shoulder surgery.      Use of a Benzoyl Peroxide solution prior to shoulder surgery decreases C. acnes and reduces post-op infections.   Your surgeon has ordered 5% Benzoyl Peroxide wash to be used three times prior to your surgery.     Please read the following instructions carefully and bring this form with you the day of surgery.     General bathing instructions starting two days before your surgery:    Shower using a fresh bar of anti-bacterial soap (such as Dial) and clean washcloth.  Pay special attention to the neck, shoulder and armpit area.   Wash your hair as usual with your regular shampoo.   Rinse hair and body thoroughly with warm water (not hot water) to remove shampoo and residue.   Dry with a clean towel.              Sleep in a clean bed with clean clothing.  Do not allow pets to sleep with you.     For 2 days before surgery, avoid shaving with a razor because the razor can irritate skin and make it easier to develop an infection.    Any areas of open skin can increase the risk of a post-operative wound infection by allowing bacteria to enter and travel throughout the body.  Notify your surgeon if you have any skin wounds / rashes even if it is not near the expected surgical site.  The area will need assessed to determine if surgery should be delayed until it is healed.      First application of 5% Benzoyl Peroxide Wash two nights before surgery:                                                                 Wash neck, shoulder (front, back, side) and armpit   with warm water, rinse and dry - see picture.  Gently wash the same areas with the Benzoyl Peroxide   cleanser going away from the neck for 10-20 seconds.   Work into a full lather and leave on the skin for   2 minutes for greatest effect.  Rinse thoroughly with warm water, not hot water.                     Pat dry with a clean towel.                                                            Wash your hands thoroughly.  Do not apply lotion, powder, perfume or deodorant.   Put on clean clothes.    Second application the night before surgery:  Repeat the above steps.        Third application morning of surgery:  Repeat the above steps.    Due to shoulder pain or decreased range of motion of your shoulder and arm, you may need assistance washing under the arm or the back portion of your shoulder.     Avoid further washing the areas of the skin treated with Benzoyl Peroxide for at least 1 hour.    For your convenience, you may purchase Benzoyl Peroxide at [facility] retail pharmacy.     Warning:  Let your physician know if you are allergic to Benzoyl Peroxide or have very sensitive skin and cannot use it.   Stop using and contact your surgeon if you experience any excessive scaling, itching, swelling, skin irritation or other signs of a reaction.  Keep out of eyes, ears, nose and mouth.  Do not apply to sunburned, irritated or broken area on the skin.  Avoid unwanted problems with bleaching effect by following these tips:  Wash hands after each use.  Avoid contact with hair, clothing, furnishings or carpeting.  Wear clean, old t-shirt or clothing to bed.   Use clean, old white pillow cases and sheets to avoid discoloring your bed linens.                                                                                                                                                                                                                                                                                    Please complete the checklist below, bring it with you to the hospital                               the day of surgery and give to the Pre-op Nurse     Preoperative Skin Prep Checklist        Patient Name Label             Enter dates and ?  boxes to indicate completed    Surgery Date: ______2/21_________ Regular Shower  Benzoyl Peroxide Wash   First Application:  2 days before_____2/19__________  (Stop shaving all body parts)           Morning or Evening                        Evening    Second Application:  1 day  before____2/20____________        Morning or Evening                          Evening   Third Application:  Day of Surgery____2/21__________                           Morning                   Morning

## 2024-02-20 LAB
QT INTERVAL: 416 MS
QTC INTERVAL: 413 MS

## 2024-02-20 NOTE — H&P
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 She had to have the shoulder relocated in the emergency room. She was placed in the sling. Physical therapy was initiated and an MRI was appropriately ordered to further evaluate her shoulder. Surgery was recommended. She does know me through friends and coworkers. She is asked me to take over her care for that reason.  ROS  Patient reports arthralgias/joint pain; left shoulder.  ROS as noted in the HPI  Physical Exam  Left shoulder:  Skin is normal. There i some atrophy in the supraspinatus and infraspinatus fossa. There is no effusion. There is no warmth. No erythema. Lymphadenopathy is negative.  Shoulder passive ROM today shows: Elevation: 150 ER(side): 40 ER(abd): 80 IR(abd): 60 IR(vert): to waist. Abduction: 100.  Shoulder strength: Elevation: 3/5 ER: 3/5 IR: 5-/5 ABD: 4/5  Positive subacromial crepitus.  Special tests: Neer test is positive. Luo test is positive. Arc of motion is positive. Empty can test was positive.  Pulses are normal. Normal sensation. Capillary refill is normal.  Assessment / Plan  MRI scan left shoulder from 1/31/2024    IMPRESSION    1. There is a Hill-Sachs lesion with subcortical marrow edema indicating a recent anterior subluxation event.    2. Large full-thickness tear of the supraspinatus and infraspinatus tendons. There is a full-thickness tear in 2 cm retraction    superior fibers of the distal subscapularis tendon.    3. There is loss of muscle bulk with grade 2 fatty atrophy of the cuff musculature and grade 1 muscle strain.    4. The intra-articular portion of the long head biceps tendon is not visualized and may be disrupted.    5. Moderate subacromial subdeltoid bursitis.    6.  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    7. Extensive superior labral tear extending from anterior to posterior. Findings are also suspicious for a nondisplaced tear of the    anterior inferior glenoid labrum.    Assessment: Patient is status post left shoulder dislocation with subsequent large retracted rotator cuff tear. The tendon is retracted back to the level of the glenoid. I reviewed the images personally and showed the patient. There is grade 2 fatty atrophy indicating that she had an acute injury but on a chronic problem likely, chronic rotator cuff tear. She does recollect tearing her biceps in the past and she was evaluated by Dr. Maik Calderón but no MRI was obtained. In my opinion repair of this tendon would be difficult to have a successful repair. I think a more reliable procedure would be a left reverse total shoulder arthroplasty to regain functional use of her arm and relieve pain.    Plan: I recommend CT scan left shoulder [employer] protocol. I recommend left reverse total shoulder replacement.    We discussed the benefits and risks of surgical intervention, as well as alternative treatments. Potential surgical risks and complications include but are not limited to deep venous thrombosis, infection, neurovascular injury, fracture, implant wear, implant failure, implant dislocation, possible need for revision surgery, loss of motion, limb length changes. Sufficient opportunity was given to discuss the condition and treatment plan and all questions were answered for the patient. The patient agreed to proceed with the surgical plan.    This patient will experience restricted and limited mobility post-surgically. A Caprini DVT Risk Assessment for development of deep vein thrombosis (DVT) during the first 30 days of the post-procedural period finds this patient at high risk. Based on the patient´s limited mobility following surgery, and past medical history and post-op risk of DVT, I am  ordering home-use Portable Mechanical Compression devices (PMC) to stimulate circulation, decrease swelling, and reduce the likelihood of a VTE event. I have prescribed Portable Mechanical Compression devices for home-use for a period of 30 days post-surgery. I find this to be medically necessary to limit any additional risk of complications and bleeding.  Portable Mechanical Compression devices applied to the lower extremities will be used 3 hours QD and any time the patient is at rest, including during bed rest.    The patient understands these are provided in lieu of or in conjunction with a chemoprophylaxis, as the use of heparin-derived chemoprophylaxis for deep vein thrombosis (DVT) following medical procedures is associated with significantly increased risks of bleeding complications, which is considered a contraindication in these procedures. The use of a PMC device kit is a proven alternative which safely increases volumetric venous blood flow and decreases the rate of post-procedure VTE/DVT/PE and will be post-surgically applied without delay for this patient.    Portable Mechanical Compression devices are recommended for patients who receive pharmacologic prophylaxis, using combined prophylaxis with mechanical and pharmacological methods over prophylaxis with pharmacological agents alone. The use of PMC devices for home-therapy DVT prevention is a proven alternative which safely decreases the rate of post-procedure DVT and should be approved without delay for this patient. In my opinion, this is medically necessary and reasonable, and is in accordance with accepted standards of contemporary medical practices, as referenced within the following studies:    Journal of Arthroplasty 2017. Efficacy in Deep Vein Thrombosis Prevention With Extended Mechanical Compression Device Therapy and Prophylactic Aspirin Following Total Knee Arthroplasty: A Randomized Control Trial. May; 32(5):8852-4304)    Association of  periOperative Registered Nurses (AORN). (2019). 2019 Venous Thromboembolism, AORN Guidelines for Perioperative Practice. AORN Journal, 109(2), N253-F163. doi: 10.1002/aorn.05370    American Society of Hematology (2019). American Society of Hematology 2019 guidelines for management of venous thromboembolism: prevention of venous thromboembolism in surgical hospitalized patients. Blood Advances, 3(23), 9800-7303. doi: 10.1182/bloodadvances.3696268803    1. Closed left shoulder dislocation  S43.015A: Anterior dislocation of left humerus, initial encounter    2. Nontraumatic complete rupture of rotator cuff of left shoulder  M75.122: Complete rotator cuff tear or rupture of left shoulder, not specified as traumatic

## 2024-02-21 ENCOUNTER — APPOINTMENT (OUTPATIENT)
Dept: GENERAL RADIOLOGY | Facility: HOSPITAL | Age: 74
End: 2024-02-21
Payer: MEDICARE

## 2024-02-21 ENCOUNTER — ANESTHESIA EVENT (OUTPATIENT)
Dept: PERIOP | Facility: HOSPITAL | Age: 74
End: 2024-02-21
Payer: MEDICARE

## 2024-02-21 ENCOUNTER — HOSPITAL ENCOUNTER (OUTPATIENT)
Facility: HOSPITAL | Age: 74
Setting detail: OBSERVATION
Discharge: HOME | End: 2024-02-21
Attending: ORTHOPAEDIC SURGERY | Admitting: ORTHOPAEDIC SURGERY
Payer: MEDICARE

## 2024-02-21 ENCOUNTER — ANESTHESIA (OUTPATIENT)
Dept: PERIOP | Facility: HOSPITAL | Age: 74
End: 2024-02-21
Payer: MEDICARE

## 2024-02-21 VITALS
OXYGEN SATURATION: 96 % | HEART RATE: 64 BPM | SYSTOLIC BLOOD PRESSURE: 121 MMHG | BODY MASS INDEX: 31.06 KG/M2 | WEIGHT: 192.46 LBS | DIASTOLIC BLOOD PRESSURE: 77 MMHG | RESPIRATION RATE: 16 BRPM | TEMPERATURE: 97.5 F

## 2024-02-21 DIAGNOSIS — Z96.612 STATUS POST REVERSE TOTAL SHOULDER REPLACEMENT, LEFT: Primary | ICD-10-CM

## 2024-02-21 PROBLEM — M19.019 SHOULDER ARTHRITIS: Status: ACTIVE | Noted: 2024-02-21

## 2024-02-21 PROCEDURE — 25010000002 CEFAZOLIN IN DEXTROSE 2-4 GM/100ML-% SOLUTION: Performed by: ORTHOPAEDIC SURGERY

## 2024-02-21 PROCEDURE — C1713 ANCHOR/SCREW BN/BN,TIS/BN: HCPCS | Performed by: ORTHOPAEDIC SURGERY

## 2024-02-21 PROCEDURE — 25010000002 BUPIVACAINE (PF) 0.5 % SOLUTION: Performed by: STUDENT IN AN ORGANIZED HEALTH CARE EDUCATION/TRAINING PROGRAM

## 2024-02-21 PROCEDURE — 25010000002 ONDANSETRON PER 1 MG: Performed by: NURSE ANESTHETIST, CERTIFIED REGISTERED

## 2024-02-21 PROCEDURE — 97110 THERAPEUTIC EXERCISES: CPT

## 2024-02-21 PROCEDURE — 97165 OT EVAL LOW COMPLEX 30 MIN: CPT

## 2024-02-21 PROCEDURE — C1776 JOINT DEVICE (IMPLANTABLE): HCPCS | Performed by: ORTHOPAEDIC SURGERY

## 2024-02-21 PROCEDURE — 25010000002 SUGAMMADEX 200 MG/2ML SOLUTION: Performed by: NURSE ANESTHETIST, CERTIFIED REGISTERED

## 2024-02-21 PROCEDURE — 25010000002 PHENYLEPHRINE 10 MG/ML SOLUTION: Performed by: NURSE ANESTHETIST, CERTIFIED REGISTERED

## 2024-02-21 PROCEDURE — C9290 INJ, BUPIVACAINE LIPOSOME: HCPCS | Performed by: STUDENT IN AN ORGANIZED HEALTH CARE EDUCATION/TRAINING PROGRAM

## 2024-02-21 PROCEDURE — 25010000002 MIDAZOLAM PER 1 MG: Performed by: STUDENT IN AN ORGANIZED HEALTH CARE EDUCATION/TRAINING PROGRAM

## 2024-02-21 PROCEDURE — 25810000003 LACTATED RINGERS PER 1000 ML: Performed by: STUDENT IN AN ORGANIZED HEALTH CARE EDUCATION/TRAINING PROGRAM

## 2024-02-21 PROCEDURE — 0 BUPIVACAINE LIPOSOME 1.3 % SUSPENSION: Performed by: STUDENT IN AN ORGANIZED HEALTH CARE EDUCATION/TRAINING PROGRAM

## 2024-02-21 PROCEDURE — 73020 X-RAY EXAM OF SHOULDER: CPT

## 2024-02-21 PROCEDURE — 25010000002 PROPOFOL 10 MG/ML EMULSION: Performed by: NURSE ANESTHETIST, CERTIFIED REGISTERED

## 2024-02-21 PROCEDURE — 25010000002 FENTANYL CITRATE (PF) 50 MCG/ML SOLUTION: Performed by: STUDENT IN AN ORGANIZED HEALTH CARE EDUCATION/TRAINING PROGRAM

## 2024-02-21 PROCEDURE — 25010000002 DEXAMETHASONE PER 1 MG: Performed by: NURSE ANESTHETIST, CERTIFIED REGISTERED

## 2024-02-21 PROCEDURE — 25010000002 CEFAZOLIN PER 500 MG: Performed by: ORTHOPAEDIC SURGERY

## 2024-02-21 DEVICE — IMPLANTABLE DEVICE: Type: IMPLANTABLE DEVICE | Site: SHOULDER | Status: FUNCTIONAL

## 2024-02-21 DEVICE — IMPLANTABLE DEVICE
Type: IMPLANTABLE DEVICE | Site: SHOULDER | Status: FUNCTIONAL
Brand: EQUINOXE

## 2024-02-21 DEVICE — GLENOID PLATE
Type: IMPLANTABLE DEVICE | Site: SHOULDER | Status: FUNCTIONAL
Brand: EQUINOXE

## 2024-02-21 DEVICE — GLENOSPHERE, LATERALIZED
Type: IMPLANTABLE DEVICE | Site: SHOULDER | Status: FUNCTIONAL
Brand: EQUINOXE

## 2024-02-21 RX ORDER — FAMOTIDINE 10 MG/ML
20 INJECTION, SOLUTION INTRAVENOUS ONCE
Status: COMPLETED | OUTPATIENT
Start: 2024-02-21 | End: 2024-02-21

## 2024-02-21 RX ORDER — DEXAMETHASONE SODIUM PHOSPHATE 4 MG/ML
INJECTION, SOLUTION INTRA-ARTICULAR; INTRALESIONAL; INTRAMUSCULAR; INTRAVENOUS; SOFT TISSUE AS NEEDED
Status: DISCONTINUED | OUTPATIENT
Start: 2024-02-21 | End: 2024-02-21 | Stop reason: SURG

## 2024-02-21 RX ORDER — HYDROMORPHONE HYDROCHLORIDE 1 MG/ML
0.25 INJECTION, SOLUTION INTRAMUSCULAR; INTRAVENOUS; SUBCUTANEOUS
Status: DISCONTINUED | OUTPATIENT
Start: 2024-02-21 | End: 2024-02-21 | Stop reason: HOSPADM

## 2024-02-21 RX ORDER — ROCURONIUM BROMIDE 10 MG/ML
INJECTION, SOLUTION INTRAVENOUS AS NEEDED
Status: DISCONTINUED | OUTPATIENT
Start: 2024-02-21 | End: 2024-02-21 | Stop reason: SURG

## 2024-02-21 RX ORDER — OXYCODONE HYDROCHLORIDE AND ACETAMINOPHEN 5; 325 MG/1; MG/1
1 TABLET ORAL EVERY 4 HOURS PRN
Status: CANCELLED | OUTPATIENT
Start: 2024-02-21 | End: 2024-02-26

## 2024-02-21 RX ORDER — MAGNESIUM HYDROXIDE 1200 MG/15ML
LIQUID ORAL AS NEEDED
Status: DISCONTINUED | OUTPATIENT
Start: 2024-02-21 | End: 2024-02-21 | Stop reason: HOSPADM

## 2024-02-21 RX ORDER — PROMETHAZINE HYDROCHLORIDE 25 MG/1
25 SUPPOSITORY RECTAL ONCE AS NEEDED
Status: DISCONTINUED | OUTPATIENT
Start: 2024-02-21 | End: 2024-02-21 | Stop reason: HOSPADM

## 2024-02-21 RX ORDER — EPHEDRINE SULFATE 50 MG/ML
5 INJECTION, SOLUTION INTRAVENOUS ONCE AS NEEDED
Status: DISCONTINUED | OUTPATIENT
Start: 2024-02-21 | End: 2024-02-21 | Stop reason: HOSPADM

## 2024-02-21 RX ORDER — FENTANYL CITRATE 50 UG/ML
25 INJECTION, SOLUTION INTRAMUSCULAR; INTRAVENOUS
Status: DISCONTINUED | OUTPATIENT
Start: 2024-02-21 | End: 2024-02-21 | Stop reason: HOSPADM

## 2024-02-21 RX ORDER — OXYCODONE HYDROCHLORIDE AND ACETAMINOPHEN 5; 325 MG/1; MG/1
1 TABLET ORAL EVERY 4 HOURS PRN
Qty: 30 TABLET | Refills: 0 | Status: SHIPPED | OUTPATIENT
Start: 2024-02-21

## 2024-02-21 RX ORDER — DIPHENHYDRAMINE HYDROCHLORIDE 50 MG/ML
12.5 INJECTION INTRAMUSCULAR; INTRAVENOUS
Status: DISCONTINUED | OUTPATIENT
Start: 2024-02-21 | End: 2024-02-21 | Stop reason: HOSPADM

## 2024-02-21 RX ORDER — ONDANSETRON 2 MG/ML
4 INJECTION INTRAMUSCULAR; INTRAVENOUS ONCE AS NEEDED
Status: DISCONTINUED | OUTPATIENT
Start: 2024-02-21 | End: 2024-02-21 | Stop reason: HOSPADM

## 2024-02-21 RX ORDER — SCOLOPAMINE TRANSDERMAL SYSTEM 1 MG/1
1 PATCH, EXTENDED RELEASE TRANSDERMAL ONCE
Status: DISCONTINUED | OUTPATIENT
Start: 2024-02-21 | End: 2024-02-21 | Stop reason: HOSPADM

## 2024-02-21 RX ORDER — CEFAZOLIN SODIUM 2 G/100ML
2000 INJECTION, SOLUTION INTRAVENOUS ONCE
Status: COMPLETED | OUTPATIENT
Start: 2024-02-21 | End: 2024-02-21

## 2024-02-21 RX ORDER — FLUMAZENIL 0.1 MG/ML
0.2 INJECTION INTRAVENOUS AS NEEDED
Status: DISCONTINUED | OUTPATIENT
Start: 2024-02-21 | End: 2024-02-21 | Stop reason: HOSPADM

## 2024-02-21 RX ORDER — SODIUM CHLORIDE 0.9 % (FLUSH) 0.9 %
3-10 SYRINGE (ML) INJECTION AS NEEDED
Status: DISCONTINUED | OUTPATIENT
Start: 2024-02-21 | End: 2024-02-21 | Stop reason: HOSPADM

## 2024-02-21 RX ORDER — SODIUM CHLORIDE, SODIUM LACTATE, POTASSIUM CHLORIDE, CALCIUM CHLORIDE 600; 310; 30; 20 MG/100ML; MG/100ML; MG/100ML; MG/100ML
9 INJECTION, SOLUTION INTRAVENOUS CONTINUOUS
Status: DISCONTINUED | OUTPATIENT
Start: 2024-02-21 | End: 2024-02-21 | Stop reason: HOSPADM

## 2024-02-21 RX ORDER — FENTANYL CITRATE 50 UG/ML
50 INJECTION, SOLUTION INTRAMUSCULAR; INTRAVENOUS ONCE AS NEEDED
Status: COMPLETED | OUTPATIENT
Start: 2024-02-21 | End: 2024-02-21

## 2024-02-21 RX ORDER — DROPERIDOL 2.5 MG/ML
0.62 INJECTION, SOLUTION INTRAMUSCULAR; INTRAVENOUS
Status: DISCONTINUED | OUTPATIENT
Start: 2024-02-21 | End: 2024-02-21 | Stop reason: HOSPADM

## 2024-02-21 RX ORDER — OXYCODONE HYDROCHLORIDE AND ACETAMINOPHEN 5; 325 MG/1; MG/1
1 TABLET ORAL EVERY 4 HOURS PRN
Qty: 30 TABLET | Refills: 0 | OUTPATIENT
Start: 2024-02-21

## 2024-02-21 RX ORDER — PROPOFOL 10 MG/ML
VIAL (ML) INTRAVENOUS AS NEEDED
Status: DISCONTINUED | OUTPATIENT
Start: 2024-02-21 | End: 2024-02-21 | Stop reason: SURG

## 2024-02-21 RX ORDER — HYDROCODONE BITARTRATE AND ACETAMINOPHEN 5; 325 MG/1; MG/1
1 TABLET ORAL ONCE AS NEEDED
Status: DISCONTINUED | OUTPATIENT
Start: 2024-02-21 | End: 2024-02-21 | Stop reason: HOSPADM

## 2024-02-21 RX ORDER — ONDANSETRON 4 MG/1
4 TABLET, FILM COATED ORAL EVERY 8 HOURS PRN
Qty: 30 TABLET | Refills: 0 | Status: SHIPPED | OUTPATIENT
Start: 2024-02-21

## 2024-02-21 RX ORDER — NALOXONE HCL 0.4 MG/ML
0.2 VIAL (ML) INJECTION AS NEEDED
Status: DISCONTINUED | OUTPATIENT
Start: 2024-02-21 | End: 2024-02-21 | Stop reason: HOSPADM

## 2024-02-21 RX ORDER — ONDANSETRON 4 MG/1
4 TABLET, FILM COATED ORAL EVERY 8 HOURS PRN
Qty: 30 TABLET | Refills: 0 | OUTPATIENT
Start: 2024-02-21

## 2024-02-21 RX ORDER — LABETALOL HYDROCHLORIDE 5 MG/ML
5 INJECTION, SOLUTION INTRAVENOUS
Status: DISCONTINUED | OUTPATIENT
Start: 2024-02-21 | End: 2024-02-21 | Stop reason: HOSPADM

## 2024-02-21 RX ORDER — LIDOCAINE HYDROCHLORIDE 20 MG/ML
INJECTION, SOLUTION INFILTRATION; PERINEURAL AS NEEDED
Status: DISCONTINUED | OUTPATIENT
Start: 2024-02-21 | End: 2024-02-21 | Stop reason: SURG

## 2024-02-21 RX ORDER — ASPIRIN 81 MG/1
81 TABLET ORAL DAILY
Status: CANCELLED | OUTPATIENT
Start: 2024-02-22

## 2024-02-21 RX ORDER — ONDANSETRON 2 MG/ML
INJECTION INTRAMUSCULAR; INTRAVENOUS AS NEEDED
Status: DISCONTINUED | OUTPATIENT
Start: 2024-02-21 | End: 2024-02-21 | Stop reason: SURG

## 2024-02-21 RX ORDER — IPRATROPIUM BROMIDE AND ALBUTEROL SULFATE 2.5; .5 MG/3ML; MG/3ML
3 SOLUTION RESPIRATORY (INHALATION) ONCE AS NEEDED
Status: DISCONTINUED | OUTPATIENT
Start: 2024-02-21 | End: 2024-02-21 | Stop reason: HOSPADM

## 2024-02-21 RX ORDER — HYDRALAZINE HYDROCHLORIDE 20 MG/ML
5 INJECTION INTRAMUSCULAR; INTRAVENOUS
Status: DISCONTINUED | OUTPATIENT
Start: 2024-02-21 | End: 2024-02-21 | Stop reason: HOSPADM

## 2024-02-21 RX ORDER — EPHEDRINE SULFATE 50 MG/ML
INJECTION, SOLUTION INTRAVENOUS AS NEEDED
Status: DISCONTINUED | OUTPATIENT
Start: 2024-02-21 | End: 2024-02-21 | Stop reason: SURG

## 2024-02-21 RX ORDER — HYDROCODONE BITARTRATE AND ACETAMINOPHEN 7.5; 325 MG/1; MG/1
1 TABLET ORAL EVERY 4 HOURS PRN
Status: DISCONTINUED | OUTPATIENT
Start: 2024-02-21 | End: 2024-02-21 | Stop reason: HOSPADM

## 2024-02-21 RX ORDER — ACETAMINOPHEN 500 MG
1000 TABLET ORAL ONCE
Status: COMPLETED | OUTPATIENT
Start: 2024-02-21 | End: 2024-02-21

## 2024-02-21 RX ORDER — PROMETHAZINE HYDROCHLORIDE 25 MG/1
25 TABLET ORAL ONCE AS NEEDED
Status: DISCONTINUED | OUTPATIENT
Start: 2024-02-21 | End: 2024-02-21 | Stop reason: HOSPADM

## 2024-02-21 RX ORDER — PHENYLEPHRINE HYDROCHLORIDE 10 MG/ML
INJECTION INTRAVENOUS AS NEEDED
Status: DISCONTINUED | OUTPATIENT
Start: 2024-02-21 | End: 2024-02-21 | Stop reason: SURG

## 2024-02-21 RX ORDER — SODIUM CHLORIDE 0.9 % (FLUSH) 0.9 %
3 SYRINGE (ML) INJECTION EVERY 12 HOURS SCHEDULED
Status: DISCONTINUED | OUTPATIENT
Start: 2024-02-21 | End: 2024-02-21 | Stop reason: HOSPADM

## 2024-02-21 RX ORDER — BUPIVACAINE HYDROCHLORIDE 5 MG/ML
INJECTION, SOLUTION EPIDURAL; INTRACAUDAL
Status: COMPLETED | OUTPATIENT
Start: 2024-02-21 | End: 2024-02-21

## 2024-02-21 RX ORDER — MIDAZOLAM HYDROCHLORIDE 1 MG/ML
0.5 INJECTION INTRAMUSCULAR; INTRAVENOUS
Status: COMPLETED | OUTPATIENT
Start: 2024-02-21 | End: 2024-02-21

## 2024-02-21 RX ORDER — LIDOCAINE HYDROCHLORIDE 10 MG/ML
0.5 INJECTION, SOLUTION INFILTRATION; PERINEURAL ONCE AS NEEDED
Status: DISCONTINUED | OUTPATIENT
Start: 2024-02-21 | End: 2024-02-21 | Stop reason: HOSPADM

## 2024-02-21 RX ADMIN — ROCURONIUM BROMIDE 15 MG: 10 INJECTION, SOLUTION INTRAVENOUS at 13:38

## 2024-02-21 RX ADMIN — PHENYLEPHRINE HYDROCHLORIDE 200 MCG: 10 INJECTION INTRAVENOUS at 13:17

## 2024-02-21 RX ADMIN — LIDOCAINE HYDROCHLORIDE 80 MG: 20 INJECTION, SOLUTION INFILTRATION; PERINEURAL at 12:35

## 2024-02-21 RX ADMIN — PROPOFOL 50 MG: 10 INJECTION, EMULSION INTRAVENOUS at 13:38

## 2024-02-21 RX ADMIN — DEXAMETHASONE SODIUM PHOSPHATE 8 MG: 4 INJECTION, SOLUTION INTRA-ARTICULAR; INTRALESIONAL; INTRAMUSCULAR; INTRAVENOUS; SOFT TISSUE at 12:35

## 2024-02-21 RX ADMIN — BUPIVACAINE HYDROCHLORIDE 15 ML: 5 INJECTION, SOLUTION EPIDURAL; INTRACAUDAL; PERINEURAL at 11:28

## 2024-02-21 RX ADMIN — PROPOFOL 150 MG: 10 INJECTION, EMULSION INTRAVENOUS at 12:35

## 2024-02-21 RX ADMIN — CEFAZOLIN SODIUM 2000 MG: 2 INJECTION, SOLUTION INTRAVENOUS at 12:23

## 2024-02-21 RX ADMIN — SUGAMMADEX 200 MG: 100 INJECTION, SOLUTION INTRAVENOUS at 14:02

## 2024-02-21 RX ADMIN — PHENYLEPHRINE HYDROCHLORIDE 100 MCG: 10 INJECTION INTRAVENOUS at 13:53

## 2024-02-21 RX ADMIN — ONDANSETRON 4 MG: 2 INJECTION INTRAMUSCULAR; INTRAVENOUS at 13:50

## 2024-02-21 RX ADMIN — ACETAMINOPHEN 1000 MG: 500 TABLET ORAL at 10:50

## 2024-02-21 RX ADMIN — PHENYLEPHRINE HYDROCHLORIDE 150 MCG: 10 INJECTION INTRAVENOUS at 13:45

## 2024-02-21 RX ADMIN — SODIUM CHLORIDE, POTASSIUM CHLORIDE, SODIUM LACTATE AND CALCIUM CHLORIDE: 600; 310; 30; 20 INJECTION, SOLUTION INTRAVENOUS at 12:32

## 2024-02-21 RX ADMIN — BUPIVACAINE 10 ML: 13.3 INJECTION, SUSPENSION, LIPOSOMAL INFILTRATION at 11:28

## 2024-02-21 RX ADMIN — FAMOTIDINE 20 MG: 10 INJECTION INTRAVENOUS at 11:05

## 2024-02-21 RX ADMIN — FENTANYL CITRATE 50 MCG: 50 INJECTION, SOLUTION INTRAMUSCULAR; INTRAVENOUS at 11:07

## 2024-02-21 RX ADMIN — EPHEDRINE SULFATE 10 MG: 50 INJECTION INTRAVENOUS at 13:19

## 2024-02-21 RX ADMIN — SCOPALAMINE 1 PATCH: 1 PATCH, EXTENDED RELEASE TRANSDERMAL at 10:50

## 2024-02-21 RX ADMIN — PHENYLEPHRINE HYDROCHLORIDE 150 MCG: 10 INJECTION INTRAVENOUS at 13:08

## 2024-02-21 RX ADMIN — PHENYLEPHRINE HYDROCHLORIDE 100 MCG: 10 INJECTION INTRAVENOUS at 12:58

## 2024-02-21 RX ADMIN — EPHEDRINE SULFATE 10 MG: 50 INJECTION INTRAVENOUS at 13:53

## 2024-02-21 RX ADMIN — ROCURONIUM BROMIDE 50 MG: 10 INJECTION, SOLUTION INTRAVENOUS at 12:35

## 2024-02-21 RX ADMIN — SODIUM CHLORIDE, POTASSIUM CHLORIDE, SODIUM LACTATE AND CALCIUM CHLORIDE: 600; 310; 30; 20 INJECTION, SOLUTION INTRAVENOUS at 14:02

## 2024-02-21 RX ADMIN — MIDAZOLAM 0.5 MG: 1 INJECTION INTRAMUSCULAR; INTRAVENOUS at 11:12

## 2024-02-21 RX ADMIN — MIDAZOLAM 0.5 MG: 1 INJECTION INTRAMUSCULAR; INTRAVENOUS at 11:07

## 2024-02-21 NOTE — THERAPY EVALUATION
Patient Name: Gina Gonzalez  : 1950    MRN: [id number]                              Today's Date: 2024       Admit Date: 2024    Visit Dx:     ICD-10-CM ICD-9-CM   1. Status post reverse total shoulder replacement, left  Z96.612 V43.61     Patient Active Problem List   Diagnosis    Primary osteoarthritis of both hips    Other hyperlipidemia    Essential hypertension    Anxiety    Cerebral meningioma     Past Medical History:   Diagnosis Date    Anxiety and depression     Arthritis     OSTEO    GERD (gastroesophageal reflux disease)     Hyperlipidemia     Hypertension     Left shoulder pain     ROTATOR CUFF ISSUES    PONV (postoperative nausea and vomiting)     Right shoulder pain     HAS TEAR ON RIGHT SIDE FOR QUITE A WHILE-4 YEARS    Skipped beats     ON OCC    TGA (transient global amnesia) 10/13/2020     Past Surgical History:   Procedure Laterality Date    ABDOMINOPLASTY      BLEPHAROPLASTY Bilateral     BREAST BIOPSY Left     BENIGN    COLONOSCOPY N/A 2019    Procedure: COLONOSCOPY TO CECUM AND TI;  Surgeon: Cecilia Aviles MD;  Location: [facility] ENDOSCOPY;  Service: Gastroenterology    MENISCECTOMY Right     TOTAL HIP ARTHROPLASTY Bilateral       General Information       Row Name 24 1607          OT Time and Intention    Document Type evaluation  -JW     Mode of Treatment occupational therapy  -JW       Row Name 24 1607          General Information    Patient Profile Reviewed yes  -JW     Existing Precautions/Restrictions shoulder  -JW     Barriers to Rehab none identified  -JW       Row Name 24 1607          Living Environment    People in Home spouse  unable to assist d/t dementia. Sister and friends plan to assist at d/c  -JW       Row Name 24 1607          Cognition    Orientation Status (Cognition) oriented x 3  -JW       Row Name 24 1607          Safety Issues, Functional Mobility    Impairments Affecting Function (Mobility) range of motion  (ROM);sensation/sensory awareness;strength  -               User Key  (r) = Recorded By, (t) = Taken By, (c) = Cosigned By      Initials Name Provider Type     Rosi Harper OT Occupational Therapist                     Mobility/ADL's       [facility] 02/21/24 1608          Transfers    Transfers sit-stand transfer  -JW       Row Name 02/21/24 1608          Sit-Stand Transfer    Sit-Stand [place] (Transfers) standby assist  -[facility] 02/21/24 1608          Activities of Daily Living    BADL Assessment/Intervention upper body dressing;lower body dressing  -JW       Row Name 02/21/24 1608          Mobility    Extremity Weight-bearing Status left upper extremity  -     Left Upper Extremity (Weight-bearing Status) non weight-bearing (NWB)  -JW       Row Name 02/21/24 1608          Lower Body Dressing Assessment/Training    [place] Level (Lower Body Dressing) don;pants/bottoms;shoes/slippers;socks;moderate assist (50% patient effort);minimum assist (75% patient effort)  -     Position (Lower Body Dressing) edge of bed sitting;supported standing  -JW       Row Name 02/21/24 1608          Upper Body Dressing Assessment/Training    [place] Level (Upper Body Dressing) don;front opening garment;moderate assist (50% patient effort)  -     Position (Upper Body Dressing) edge of bed sitting  -     Comment, (Upper Body Dressing) raji dressing techniques  -               User Key  (r) = Recorded By, (t) = Taken By, (c) = Cosigned By      Initials Name Provider Type     Rosi Harper OT Occupational Therapist                   Obj/Interventions       [facility] Name 02/21/24 1608          Sensory Assessment (Somatosensory)    Sensory Assessment nerve block still inact  -JW       Row Name 02/21/24 1608          Range of Motion Comprehensive    Comment, General Range of Motion normal post-op dec AROM  -JW       Row Name 02/21/24 1608          Strength Comprehensive (MMT)    Comment, General Manual Muscle  Testing (MMT) Assessment normal post-op weakness  -JW       Row Name 02/21/24 1608          Shoulder (Therapeutic Exercise)    Shoulder (Therapeutic Exercise) pendulum exercises  -JW       Row Name 02/21/24 1608          Elbow/Forearm (Therapeutic Exercise)    Elbow/Forearm (Therapeutic Exercise) AROM (active range of motion)  -     Elbow/Forearm AROM (Therapeutic Exercise) flexion;extension;supination;pronation  -JW       Row Name 02/21/24 1608          Wrist (Therapeutic Exercise)    Wrist (Therapeutic Exercise) AROM (active range of motion)  -     Wrist AROM (Therapeutic Exercise) flexion;extension  -JW       Row Name 02/21/24 1608          Hand (Therapeutic Exercise)    Hand (Therapeutic Exercise) AROM (active range of motion)  -     Hand AROM/AAROM (Therapeutic Exercise) AROM (active range of motion);finger flexion;finger extension  -JW       Row Name 02/21/24 1608          Motor Skills    Therapeutic Exercise shoulder;elbow/forearm;wrist;hand  -               User Key  (r) = Recorded By, (t) = Taken By, (c) = Cosigned By      Initials Name Provider Type     Rosi Harper OT Occupational Therapist                   Goals/Plan       Row Name 02/21/24 1610          Problem Specific Goal 1 (OT)    Problem Specific Goal 1 (OT) Pt will be educated on shoulder px and HEP prior to d/c  -     Time Frame (Problem Specific Goal 1, OT) by discharge  -     Progress/Outcome (Problem Specific Goal 1, OT) goal met  -               User Key  (r) = Recorded By, (t) = Taken By, (c) = Cosigned By      Initials Name Provider Type     Rosi Harper OT Occupational Therapist                   Clinical Impression       Row Name 02/21/24 1608          Pain Assessment    Pretreatment Pain Rating 0/10 - no pain  -     Posttreatment Pain Rating 0/10 - no pain  -JW       Row Name 02/21/24 1608          Plan of Care Review    Plan of Care Reviewed With patient;sibling  -     Outcome Evaluation Pt POD0 s/p L TSA. She is  typically indep with ADLs and fxl mobility w/o AD. Lives with spouse who is unable to assist d/t dementia- pt's sister and friend assisting at d/c. OT provides education on shoulder px, sling mgt, ADL techniques, and HEP. Pt demo's good understanding of exercises and instructed to complete independently once sensation has returned. Pt completes UE dressing with ModA using raji dressing techs, LB dressing with ModA. Pt plans to d/c home with family to assist as needed. All OT goals met at evaluation, will sign off and recommend follow-up with OP services.  -       Row Name 02/21/24 1608          Therapy Assessment/Plan (OT)    Therapy Frequency (OT) evaluation only  -       Row Name 02/21/24 1608          Positioning and Restraints    Pre-Treatment Position in bed  -JW     Post Treatment Position bed  -JW     In Bed notified nsg;sitting EOB;call light within reach;encouraged to call for assist;with family/caregiver  -               User Key  (r) = Recorded By, (t) = Taken By, (c) = Cosigned By      Initials Name Provider Type    Rosi Bah OT Occupational Therapist                   Outcome Measures       Row Name 02/21/24 1610          How much help from another is currently needed...    Putting on and taking off regular lower body clothing? 2  -JW     Bathing (including washing, rinsing, and drying) 3  -JW     Toileting (which includes using toilet bed pan or urinal) 3  -JW     Putting on and taking off regular upper body clothing 3  -JW     Taking care of personal grooming (such as brushing teeth) 3  -JW     Eating meals 3  -JW     AM-PAC 6 Clicks Score (OT) 17  -       Row Name 02/21/24 1610          Functional Assessment    Outcome Measure Options AM-PAC 6 Clicks Daily Activity (OT)  -               User Key  (r) = Recorded By, (t) = Taken By, (c) = Cosigned By      Initials Name Provider Type    Rosi Bah OT Occupational Therapist                    Occupational Therapy Education        Title: PT OT SLP Therapies (Done)       Topic: Occupational Therapy (Done)       Point: ADL training (Done)       Description:   Instruct learner(s) on proper safety adaptation and remediation techniques during self care or transfers.   Instruct in proper use of assistive devices.                  Learning Progress Summary             Patient Acceptance, E,D,H, VU,DU by  at 2/21/2024 1610   Family Acceptance, E,D,H, VU,DU by  at 2/21/2024 1610                         Point: Home exercise program (Done)       Description:   Instruct learner(s) on appropriate technique for monitoring, assisting and/or progressing therapeutic exercises/activities.                  Learning Progress Summary             Patient Acceptance, E,D,H, VU,DU by KATHRYN at 2/21/2024 1610   Family Acceptance, E,D,H, VU,DU by  at 2/21/2024 1610                         Point: Precautions (Done)       Description:   Instruct learner(s) on prescribed precautions during self-care and functional transfers.                  Learning Progress Summary             Patient Acceptance, E,D,H, VU,DU by  at 2/21/2024 1610   Family Acceptance, E,D,H, VU,DU by  at 2/21/2024 1610                         Point: Body mechanics (Done)       Description:   Instruct learner(s) on proper positioning and spine alignment during self-care, functional mobility activities and/or exercises.                  Learning Progress Summary             Patient Acceptance, E,D,H, VU,DU by  at 2/21/2024 1610   Family Acceptance, E,D,H, VU,DU by  at 2/21/2024 1610                                         User Key       Initials Effective Dates Name Provider Type Discipline     06/10/21 -  Rosi Harper OT Occupational Therapist OT                  OT Recommendation and Plan  Therapy Frequency (OT): evaluation only  Plan of Care Review  Plan of Care Reviewed With: patient, sibling  Outcome Evaluation: Pt POD0 s/p L TSA. She is typically indep with ADLs and fxl mobility w/o AD.  Lives with spouse who is unable to assist d/t dementia- pt's sister and friend assisting at d/c. OT provides education on shoulder px, sling mgt, ADL techniques, and HEP. Pt demo's good understanding of exercises and instructed to complete independently once sensation has returned. Pt completes UE dressing with ModA using raji dressing techs, LB dressing with ModA. Pt plans to d/c home with family to assist as needed. All OT goals met at evaluation, will sign off and recommend follow-up with OP services.     Time Calculation:   Evaluation Complexity (OT)  Review Occupational Profile/Medical/Therapy History Complexity: brief/low complexity  Assessment, Occupational Performance/Identification of Deficit Complexity: 1-3 performance deficits  Clinical Decision Making Complexity (OT): problem focused assessment/low complexity  Overall Complexity of Evaluation (OT): low complexity     Time Calculation- OT       Row Name 02/21/24 1611             Time Calculation- OT    OT Start Time 1534  -JW      OT Stop Time 1600  -JW      OT Time Calculation (min) 26 min  -JW      Total Timed Code Minutes- OT 16 minute(s)  -JW      OT Received On 02/21/24  -         Timed Charges    79885 - OT Therapeutic Exercise Minutes 10  -JW      05770 - OT Self Care/Mgmt Minutes 6  -JW         Untimed Charges    OT Eval/Re-eval Minutes 10  -JW         Total Minutes    Timed Charges Total Minutes 16  -JW      Untimed Charges Total Minutes 10  -JW       Total Minutes 26  -JW                User Key  (r) = Recorded By, (t) = Taken By, (c) = Cosigned By      Initials Name Provider Type     Rosi Harper OT Occupational Therapist                  Therapy Charges for Today       Code Description Service Date Service Provider Modifiers Qty    [id number]  OT THER PROC EA 15 MIN 2/21/2024 Rosi Harper OT GO 1    [id number]  OT EVAL LOW COMPLEXITY 3 2/21/2024 Rosi Harper OT GO 1                 Rosi Harper OT  2/21/2024

## 2024-02-21 NOTE — ANESTHESIA PROCEDURE NOTES
Airway  Urgency: elective    Date/Time: 2/21/2024 12:38 PM  Airway not difficult    General Information and Staff    Patient location during procedure: OR  Anesthesiologist: Josafat George MD  CRNA/CAA: Ann Swartz CRNA    Indications and Patient Condition  Indications for airway management: airway protection    Preoxygenated: yes  Mask difficulty assessment: 2 - vent by mask + OA or adjuvant +/- NMBA    Final Airway Details  Final airway type: endotracheal airway      Successful airway: ETT  Cuffed: yes   Successful intubation technique: direct laryngoscopy  Facilitating devices/methods: intubating stylet  Endotracheal tube insertion site: oral  Blade: Naomi  Blade size: 3  ETT size (mm): 7.0  Cormack-Lehane Classification: grade IIa - partial view of glottis  Placement verified by: chest auscultation and capnometry   Cuff volume (mL): 6  Measured from: teeth  ETT/EBT  to teeth (cm): 21  Number of attempts at approach: 1  Assessment: lips, teeth, and gum same as pre-op and atraumatic intubation

## 2024-02-21 NOTE — PLAN OF CARE
Goal Outcome Evaluation:  Plan of Care Reviewed With: patient, sibling           Outcome Evaluation: Pt POD0 s/p L TSA. She is typically indep with ADLs and fxl mobility w/o AD. Lives with spouse who is unable to assist d/t dementia- pt's sister and friend assisting at d/c. OT provides education on shoulder px, sling mgt, ADL techniques, and HEP. Pt demo's good understanding of exercises and instructed to complete independently once sensation has returned. Pt completes UE dressing with ModA using raji dressing techs, LB dressing with ModA. Pt plans to d/c home with family to assist as needed. All OT goals met at evaluation, will sign off and recommend follow-up with OP services.

## 2024-02-21 NOTE — ANESTHESIA POSTPROCEDURE EVALUATION
Patient: Gina Gonzalez    Procedure Summary       Date: 02/21/24 Room / Location: [facility] OR [address] [place] [address] [facility]    Anesthesia Start: 1229 Anesthesia Stop: 1412    Procedure: TOTAL SHOULDER REVERSE ARTHROPLASTY (Left: Shoulder) Diagnosis:     Surgeons: Alverto Fish MD Provider: Josafat George MD    Anesthesia Type: general ASA Status: 2            Anesthesia Type: general    Vitals  Vitals Value Taken Time   /78 02/21/24 1500   Temp 36.4 °C (97.5 °F) 02/21/24 1410   Pulse 68 02/21/24 1506   Resp 16 02/21/24 1500   SpO2 97 % 02/21/24 1506   Vitals shown include unfiled device data.        Post Anesthesia Care and Evaluation    Patient location during evaluation: bedside  Patient participation: complete - patient participated  Level of consciousness: awake and alert  Pain management: adequate    Airway patency: patent  Anesthetic complications: No anesthetic complications  PONV Status: controlled  Cardiovascular status: blood pressure returned to baseline and acceptable  Respiratory status: acceptable  Hydration status: acceptable

## 2024-02-21 NOTE — DISCHARGE INSTRUCTIONS
What to expect after a Nerve Block    Nerve blocks administered to block pain affect many types of nerves, including those nerves that control movement, pain, and normal sensation. Following a nerve block, you may notice some bruising at the site where the block was given. You may experience sensations such as: numbness of the affected area or limb, tingling, heaviness (that is the limb feels heavy to you), weakness or inability to move the affected arm or leg, or a feeling as if your arm or leg has “fallen asleep.”     A nerve block can last from 2 to 36 hours depending on the medications used.  Usually the weakness wears off first followed by the tingling and heaviness. As the block wears off, you may begin to notice pain; however, this sequence of events may occur in any order. Typically, you will be able to move your limb before you will feel it. Once a nerve block begins to wear off, the effects are usually completely gone within 60 minutes.  If you experience continued side effects that you believe are block related for longer than 48 hours, please call your healthcare provider. Please see block-specific instructions below.    Instructions for any block involving the shoulder or arm  If you have had any kind of shoulder/arm block, you will go home with your arm in a sling. Wear the sling until the block has completely worn off. You may be required to wear it for a longer period of time per your surgeon’s recommendations.  If you have had a shoulder/arm block, it is a good idea to sleep on a recliner with pillows under your arm.    You may experience symptoms such as:  Shortness of breath  Hoarseness   Blurry vision  Unequal pupils  Drooping of your face on the same side as the block was performed    These are side effects associated with this kind of block and should go away within 12 hours.    Note: If you have severe or prolonged shortness of breath, please seek medical assistance as soon as possible.      Protection of a “blocked” arm or leg (limb)  After a nerve block, you cannot feel pain, pressure, or extremes of temperature in the affected limb. And because of this, your blocked limb is at more risk for injury. For example, it is possible to burn your limb on an extremely hot surface without feeling it.     When resting, it is important to reposition your limb periodically to avoid prolonged pressure on it. This may require the use of pillows and padding.    While sleeping, you should avoid rolling onto the affected limb or putting too much pressure on it.     If you have a cast or tight dressing, check the color of your fingers or toes of the affected limb. Call your surgeon if they look discolored (that is, dusky, dark colored).    Use caution in cold weather. Cover your limb appropriately to protect it from the cold.      Pain Management:    Your surgeon will give you a prescription for pain medication. Begin taking this before the nerve block wears off. Bear in mind that sometimes the block can wear off in the middle of the night.  Dr. Alverto Fish  [address] [address]  [phone]    Outpatient REVERSE TOTAL SHOULDER REPLACEMENT  HOSPITAL DISCHARGE INSTRUCTIONS     GENERAL INFORMATION: With improved surgical techniques for total shoulder and reverse total shoulder replacement and the Anabaptism of ultrasound guided long acting nerve blocks, the hospital stay for patients has decreased significantly.  Many people can go home right after surgery as an outpatient.    SPECIFIC POST-OP INSTRUCTIONS:  * FOLLOW UP APPOINTMENT: You should have been given an appointment to follow up 10-14 days after your date of surgery. We can see you back sooner if there are any problems or concerns.   * BLOOD THINNERS: All patients will be on some type of blood thinner post-op to prevent blood clot. Most patients who are not already on a blood thinner are discharged on over-the-counter aspirin 81 mg or 325mg daily  which you will take for three weeks. If you were taking a blood thinner prior to surgery, we will have you resume this on the first day post-op.   * ICE: Ice helps to decrease both pain and swelling. Ice should be applied to the shoulder 20-25 minutes each hour while awake.    DRESSING CHANGES: We ask that you keep the incision clean and dry.  Your surgical dressing can be removed 48 hours after surgery.  There will be a yellow strip of gauze and a Zipline device that will be underneath the dressing.  The yellow gauze can be removed but leave the Zipline alone.  You can then apply a watertight dressing over the Zipline to protect it.  You can get this type of dressing from the hospital before you leave or at your local pharmacy. SHOWERING: The wound edges typically come together and seal by 3-5 days post-op if there is no drainage. Again, please keep the same waterproof dressing on. DO NOT SUBMERSE SHOULDER IN POOL,HOT TUB OR BATH UNTIL AT LEAST 3-4 WEEKS POST-OP (EVEN WITH WATERPROOF BANDAIDS).  * PAIN  MEDICINE: You will be given a prescription for oral pain medication prior to discharge from the hospital. Please let us know if you have a sensitivity to certain pain medications prior to discharge. Additional pain medication prescriptions can be called into your pharmacy during normal business hours. NO medications can be called in over the weekends or after business hours.   * ORAL ANTI-INFLAMMATORIES:   You will be asked to discontinue ALL oral anti-inflammatories prior to surgery. You can resume these the first day post-op.These can be combined with the oral pain medications safely. DO NOT TAKE ADDITIONAL TYLENOL WITH THE NARCOTIC PAIN MEDICATION (it already has Tylenol in it). If you were not taking an anti-inflammatory pre-op, you can start one on the first day post-op. It will help decrease pain and swelling. Typical medications and dosages are as follows:   Advil/Motrin/Ibuprofen 200mg, 4 pills every 8  "hours   Aleve/Naproxen Sodium 220mg, 2 pills every 12 hours  * SLING: We recommend you wear the sling at all times, other than when showering or doing physical therapy exercises.    * WEIGHT BEARING: We ask that you be non-weight bearing on the operative shoulder. Do not use the operative arm to lift/push/pull greater than 5lbs.   * PHYSICAL THERAPY: Before you leave the hospital staff will teach you some very gentle range of motion exercises to do at home for the first 10-14 days. After we see you in the office during your first post-op visit, we will give you a prescription to start formal physical therapy. This can be done downstairs at [facility] or at a location of your choice. Physical therapy is typically 2-3 times a week for 4-6 weeks, depending on the individual and their progress.   * DRIVING A CAR: Medically/legally we can not recommend you drive a car while in the sling or while on pain medication (roughly 10-14 days).   * RETURNING TO DAILY AND RECREATIONAL ACTIVITIES: For the most part patients can progress to daily activities as tolerated (keeping in mind the restrictions listed above). Initially, we do not want you to \"overdo\" it in an attempt to minimize post-op pain and swelling. Once the swelling is controlled, you can progress with activities as tolerated. Pain and swelling should be your guide to increasing your activity level.   * RETURN TO WORK: The return to work date depends on many factors and is very dependent on the individual. You would most likely be able to return to a sedentary job after your first post-op visit (10-14 days after surgery). A more physical job would obviously require a longer recovery time before return to work.  "

## 2024-02-21 NOTE — ANESTHESIA PROCEDURE NOTES
Peripheral Block    Pre-sedation assessment completed: 2/21/2024 11:24 AM    Patient reassessed immediately prior to procedure    Patient location during procedure: pre-op  Start time: 2/21/2024 11:24 AM  Stop time: 2/21/2024 11:28 AM  Reason for block: at surgeon's request and post-op pain management  Performed by  Anesthesiologist: Tone George MD  Preanesthetic Checklist  Completed: patient identified, IV checked, site marked, risks and benefits discussed, surgical consent, monitors and equipment checked, pre-op evaluation and timeout performed  Prep:  Pt Position: supine  Sterile barriers:cap, gloves and mask  Prep: ChloraPrep  Patient monitoring: blood pressure monitoring, continuous pulse oximetry and EKG  Procedure    Sedation: yes  Performed under: local infiltration  Guidance:ultrasound guided    ULTRASOUND INTERPRETATION.  Using ultrasound guidance a 21 G gauge needle was placed in close proximity to the nerve, at which point, under ultrasound guidance anesthetic was injected in the area of the nerve and spread of the anesthesia was seen on ultrasound in close proximity thereto.  There were no abnormalities seen on ultrasound; a digital image was taken; and the patient tolerated the procedure with no complications. Images:still images obtained, printed/placed on chart    Laterality:left  Block Type:interscalene  Injection Technique:single-shot  Needle Type:echogenic and Tuohy  Needle Gauge:21 G  Resistance on Injection: none    Medications Used: bupivacaine liposome (EXPAREL) 1.3 % injection - Infiltration   10 mL - 2/21/2024 11:28:00 AM  bupivacaine (PF) (MARCAINE) 0.5 % injection - Injection   15 mL - 2/21/2024 11:28:00 AM      Post Assessment  Injection Assessment: negative aspiration for heme, no paresthesia on injection and incremental injection  Patient Tolerance:comfortable throughout block  Complications:no  Additional Notes  Ultrasound guidance used to visualize nerve anatomy, guide needle  placement and verify local anesthetic disbursement.

## 2024-02-21 NOTE — OP NOTE
Date: 2/21/2024  Time: 14:38 EST TOTAL SHOULDER REVERSE ARTHROPLASTY  Procedure Note    Gina Gonzalez  2/21/2024    Pre-op Diagnosis:    Rotator cuff arthropathy left shoulder    Post-op Diagnosis   Rotator cuff arthropathy left shoulder    Procedure:  Left reverse total shoulder arthroplasty with intraoperative computer navigation    Surgeon: Alverto Fish M.D.    Surgical assistant: Channing Reeves MD      Anesthesia: General with Block  Anesthesiologist: Josafat George MD  CRNA: Ann Swartz CRNA    Staff:   Circulator: Karen Pedraza RN  Scrub Person: Lee King  Vendor Representative: Garcia Teixeira    Indication for Asst.  A surgical assistant, Channing Reeves MD, was utilized as a medical necessity and was present through the entire procedure allowing safe completion of the procedure by helping with exposure and placement of implants as well as reducing overall operative time and morbidity for the patient.    IV antibiotic: Cefazolin    Estimated Blood Loss: 200 ml    Specimens:   Order Name Source Comment Collection Info Order Time   HEMOGLOBIN AND HEMATOCRIT, BLOOD    2/21/2024  2:34 PM     Release to patient   Routine Release            Complications: None    Implants: [employer] reverse total shoulder system     Implant specifics: 10 degree posterior augmented mini baseplate with 4 compression screws and 3 locking caps, 36 mm extended glenosphere with central screw, 10 mm Preserve press-fit stem, +0 humeral tray with torque limiting screw, 36 mm humeral liner +0.    SURGICAL APPROACH: Deltopectoral      SURGICAL TECHNIQUE: Peel off         Procedure: The patient was taken to the operative suite.  After adequate anesthesia was established the upper extremity was prepped and draped in the usual fashion.  The head was placed in a neutral position and bony prominences were padded.  Ioban was utilized covering the skin over the shoulder and axilla.  Preoperative antibiotics and transexamic acid  were confirmed.  An anterior incision was made starting lateral to the coracoid towards the axillary crease through skin and subcutaneous tissues.  The deltopectoral interval was entered.  The cephalic vein was identified, preserved, and retracted laterally.  The conjoined tendon was reflected medially.  The biceps tendon was identified near the pectoralis insertion site.  A small portion of the pectoralis tendon was released and then the biceps tendon was tenodesed to the pectoralis tendon insertion on the humeral.  The biceps tendon was then tenotomized more proximally.  The subscapularis was peeled off the lesser tuberosity and tagged.  Arthropathic changes were noted in the glenohumeral joint.  The rotator cuff was in poor condition with significant tearing of the rotator cuff with retraction and atrophy.  The capsule was released off the humeral neck and the posterior soft tissue attachments were protected. An external cutting guide was utilized and the head was cut at a 20° retroverted angle.  Excess osteophytes were excised with a rongeur.  Sequential impaction broaching was carried out using the Preserve system broaches.  The final broach was left in place for later trialing.   I then visualized the glenoid and retractors were placed starting posteriorly and superiorly.  The capsule was reflected off the deep surface of the subscapularis.  An anterior retractor was placed.  The labrum was then circumferentially excised off of the glenoid.  The biceps stump was released and removed as well.  A careful capsular release off the inferior glenoid was performed protecting the axillary nerve and vasculature.  This exposed the scapular pillar.    This allowed for visualization of the glenoid.  The coracoid was skeletonized with the Bovie exposing the anterior portion and the neck of the coracoid.  A tracker was fixed to the coracoid with 2 small bicortical screws with firm fixation.  Acquisitions were then obtained  from the bony surface of the coracoid and glenoid to transfer data to the computer to allow for navigation.  Once this was complete I was able to use computer guidance for placement of the glenoid.  The preoperative plan demonstrated 8 degrees of superior inclination with some slight retroversion that corrected down to 0 degrees of inclination and 3 degrees retroversion with a 10 degree mini superior augmented baseplate.  Using navigation a central drill point was drilled.  Sequential reaming was carried out at the prescribed angle and to the prescribed depth based on the preoperative plan using intraoperative computer guidance.    A central hole was then drilled for the cage using computer navigation.  This assured that we were within the vault of the glenoid and preserved as much bone as possible during reaming.    Autogenous bone graft was harvested from the humeral head and packed into the cage of the baseplate.  The baseplate was then compressed onto the glenoid.  Rotation was checked with computer navigation.  Computer navigation was then used to drill holes for the compression screws.  These were then placed to the appropriate depth and locking caps were placed over the screw heads.  Good baseplate placement and stable fixation were noted.  An appropriate size glenosphere based on the preoperative plan and intraoperative visualization of the anatomy was chosen and fixed to the baseplate with a central compression screw.  I then removed the tracker from the coracoid by removing both bicortical screws.  I then trialed the humeral tray and polyethylene.  I was looking for smooth nonimpinged range of motion.  I also wanted to assure good stability by placing traction on the arm and also checking for lateral stability.  Once satisfied with range of motion and stability I removed the humeral components.  I pulse lavaged the proximal humerus and press-fit the appropriate size stem with good purchase and fixation.   Next I re-trialed the humeral tray and humeral liner.  When I was satisfied with range of motion and stability I removed the trial liner and tray.  The appropriate humeral tray was then placed and held with a torque limiting screw.  The chosen polyethylene was then inserted and compressed into place and the shoulder was reduced.  Subdeltoid scar tissue was excised.  Good range of motion was noted.  Good stability was noted.  Vigorous irrigation and suctioning was performed.  The subscapularis was evaluated for repair.  The deltopectoral interval was closed with 0 Vicryl.  The subcutaneous tissues were closed with 2-0 Vicryl.  The skin was closed with a zip line device.  The patient had a sterile compression dressing applied and was awoken and taken to the postanesthetic recovery unit in good condition.    Alverto Fish MD     Date: 2/21/2024  Time: 14:38 EST

## 2024-02-21 NOTE — ANESTHESIA PREPROCEDURE EVALUATION
Anesthesia Evaluation     Patient summary reviewed and Nursing notes reviewed   history of anesthetic complications:  PONV  NPO Solid Status: > 8 hours  NPO Liquid Status: > 2 hours           Airway   Mallampati: II  TM distance: >3 FB  Neck ROM: full  Dental      Pulmonary    Cardiovascular     ECG reviewed    (+) hypertension, hyperlipidemia      Neuro/Psych  GI/Hepatic/Renal/Endo    (+) obesity, GERD    Musculoskeletal     Abdominal    Substance History      OB/GYN          Other   arthritis,                       Anesthesia Plan    ASA 2     general     intravenous induction     Anesthetic plan, risks, benefits, and alternatives have been provided, discussed and informed consent has been obtained with: patient.        CODE STATUS:

## 2024-02-22 ENCOUNTER — READMISSION MANAGEMENT (OUTPATIENT)
Dept: CALL CENTER | Facility: HOSPITAL | Age: 74
End: 2024-02-22
Payer: MEDICARE

## 2024-02-22 ENCOUNTER — TELEPHONE (OUTPATIENT)
Dept: ORTHOPEDIC SURGERY | Facility: HOSPITAL | Age: 74
End: 2024-02-22
Payer: MEDICARE

## 2024-02-22 NOTE — TELEPHONE ENCOUNTER
Post op day 1  Discharge Instructions:  Ask patient about his or her discharge instructions  ?  Patient confirmed understanding   []  Further instruction needed   What, if any, recommendations, teaching, or interventions did you provide? Click or tap here to enter text.  Health status:  Pain controlled Yes   Block is still in effect at this time. No pain   Recommended interventions:  Yes  incision/dressing status   ?  Clean without redness, drainage, odor  []  Redness    []  Drainage - color Click or tap here to enter text.  []  Odor  KAMILLE - Green light blinking Choose an item.  Difficulties urination No  Last BM 2/21/2024 (if no BM by day 3-recommend OTC suppository or fleets enema)  No BM at this time. Taking medications   Medications:  ?Medications reviewed with patient/family/caregiver  Patient taking medications as prescribed?   Yes  If not taking medications as prescribed, note specific medicine(s) and reason for each:  Click or tap here to enter text.  Hospital Follow Up Plan:  Follow up Appointment with Orthopedic surgeon:  ?Has f/u appointment                []Scheduled f/u appointment  Home Care ordered at discharge?    No        Home Care started, or contact made?    No   If no, action taken: Total Shoulder   DME obtained/used in home?         Yes   Using IS  Choose an item.   Other information: Ms. Gonzalez said she is doing ok. The block is still in effect so she hasn't started any exercises yet. She is icing. Dressing looks good so far. The dressing is to be changed tomorrow so she will be able to tell if there is swelling or bleeding. Arm remains in the sling. No pain at this time. Encouraged her to start pain medication when she begins to get feeling in her arm. She voiced understanding. She said she had called the office regarding her F/U Appt as they said they didn't have availability. She said she was told he wanted to see her in 10-14 days. They said they would see what they could do and call her  back. She hasn't heard anything yet. Otherwise, things are going well and Ms. Gonzalez doesn't have any questions for me at this time. She has my contact information should she need anything.

## 2024-02-22 NOTE — OUTREACH NOTE
Prep Survey      Flowsheet Row Responses   [facility] patient discharged from? [facility]   Is LACE score < 7 ? Yes   Eligibility Not Eligible   What are the reasons patient is not eligible? Other  [observation less than 8 hrs]   Does the patient have one of the following disease processes/diagnoses(primary or secondary)? Other   Prep survey completed? Yes            Josie ALEJANDRO - Registered Nurse

## 2024-03-06 ENCOUNTER — TREATMENT (OUTPATIENT)
Dept: PHYSICAL THERAPY | Facility: CLINIC | Age: 74
End: 2024-03-06
Payer: MEDICARE

## 2024-03-06 DIAGNOSIS — R68.89 IMPAIRED FUNCTION OF UPPER EXTREMITY: ICD-10-CM

## 2024-03-06 DIAGNOSIS — Z96.612 STATUS POST REVERSE TOTAL SHOULDER REPLACEMENT, LEFT: Primary | ICD-10-CM

## 2024-03-06 DIAGNOSIS — R29.898 WEAKNESS OF LEFT ARM: ICD-10-CM

## 2024-03-06 DIAGNOSIS — M25.612 STIFFNESS OF LEFT SHOULDER JOINT: ICD-10-CM

## 2024-03-06 NOTE — PROGRESS NOTES
[facility]    Physical Therapy Initial Evaluation and Plan of Care    Patient Name: Gina Gonzalez          :  1950  Referring Physician: Alverto Fish MD  Diagnosis: Status post reverse total shoulder replacement, left [Z96.612]    Date of Evaluation: 3/6/2024  ______________________________________________________________________    Subjective Evaluation    History of Present Illness  Date of surgery: 2024  Mechanism of injury: 2024  Mechanism of injury: Slipped on ice and dislocated (L) shoulder - Getting out of car - put foot on driveway and fell on ice - Fell stomach w/ both arms OH - Crawled in grass, pulled self up and went to MD the next day -   Told her shoulder was dislocated - bone chip, but no fx (per Pt)   To [facility] then to ER - Relocated shoulder in ER -  PT at [facility] -    MRI 2024 positive for multiple RTC tears (infraspinatus, supraspinatus, subscap, etc)- results in MEDIA    S/P (L) REV TSA - 2024  Since surgery - Intermittent pain - achiness     PMHX:   H/O RTC Tear (Not repaired) ;  Torn bicep tendons (B) shoulders (4 yrs ago most recent (R) shoulder) ;   Primary osteoarthritis of both hips  Other hyperlipidemia  Essential hypertension  Anxiety  Cerebral meningioma  Shoulder arthritis          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  Progression: improved    Hand dominance: right    Diagnostic Tests  X-ray: normal (Post-op - see report in epic)  MRI studies: abnormal    Treatments  Previous treatment: physical therapy  Current treatment: medication  Patient Goals  Patient/family treatment goals: Pain alleviaiton; Mobility and strength to allow ADLs and work out a [facility] -        ___________________________________________________  Objective          Postural Observations    Additional Postural Observation Details  Sig bruising medial arm and into forearm - Healing incision delto-pec region (L);   Rounded shoulder posture - Swelling shoulder/delt, arm (L) -     Tenderness     Additional Tenderness Details  Scar tight, but minimally tender -     Active Range of Motion     Additional Active Range of Motion Details  AROM Elbow, wrist, hand WNL w/ pain into biceps w/ full supination (L)-   Shoulder (L) NA  (R) shoulder WFL     Passive Range of Motion     Right Shoulder   Normal passive range of motion    Additional Passive Range of Motion Details  (L) SHOULDER:   FE (plane of scapula):  90-deg (easily)   ER (plane of scapula) w/ towel roll under arm;  20-deg (easily)   IR  (plane of scapula) w/ towel roll under arm;  to abdomen -     Strength/Myotome Testing     Additional Strength Details  Strength (R) shoulder / UE WFL  (L) Shoulder NA;  (L) Elbow/wrist/hand WFL grossly     Tests     Additional Tests Details  NA (L) Shoulder         TREATMENT:   MANUAL THERAPY:   Gentle PROM ER (in plane of scapula) (L) Shoulder  Gentle PROM FE  (in plane of scapula) (L) Shoulder    EXERCISE:   SELF STRETCHING ER (in plane of scapula) (L) Shoulder w/ wand and towel roll under her arm to prevent HE -20/10 sec ea  SELF STRETCHING FE (in plane of scapula) (L) Shoulder using opp hand to 90-deg max - 10/10 sec ea  SCAPULAR RETRACTION: (B) / R-L;  10/10 sec ea  FABRICATED HEP and REVIEWED w/ PATIENT.     FUNCTIONAL ACTIVITIES:   TAPING / BRACING: NA  Anatomy / Dysfunction Education  Educated Pt on precautions including no shoulder extension / IRB and positioning to prevent HE in bed/chair, etc.   Discussed guidelines and not to force PROM to pain and no lifting items heavier than coffee cup/plate and no active FE as well -   Jt protection, ADL modification; Positioning, etc.       SPADI:  113  ___________________________________________________  Assessment & Plan       Assessment  Impairments: abnormal coordination, abnormal muscle firing, abnormal muscle tone, abnormal or restricted ROM, activity intolerance, impaired physical strength, lacks appropriate home exercise program, pain with function and weight-bearing intolerance   Other impairment: decreased strength,   Functional limitations: carrying objects, lifting, sleeping, pulling, pushing, uncomfortable because of pain, moving in bed, reaching behind back, reaching overhead and unable to perform repetitive tasks   Assessment details: Gina Gonzalez is a 74 y.o. year-old female referred to physical therapy following (L) shoulder reverse TSA on 2/21/2024. . She presents with a evolving clinical presentation.  She has comorbidities including previous shoulder injuries including biceps tendon rupture (L) and personal factors including full time care giver for her  w/ dementia that may affect her progress in the plan of care.  Signs and symptoms are consistent with physical therapy diagnosis of S/P (L) Shoulder Reverse TSA 2/21/2024 w/ associated limited ROM, weakness, impaired functional ability, etc. .   Prognosis: good    Goals  Plan Goals: PT Goal Recert Due Date: 90 days    SHORT TERM GOALS: 6 weeks  STG 1.  Initiation of appropriate HEP for shoulder per rehab guidelines-.  STG 2  Pt instructed in and voices knowledge of precautions including positioning of (L) shoulder to prevent dislocation and no active motion or lifting items > a cup/plate, etc  STG 3. PROM FE to 120-deg;  ER (pos) to 45 (per guidelines)   STG 4: SPADI Score decreased > 10pts    LONG TERM GOALS: 12 weeks (or at time of DISCHARGE)   LTG 1. Pt (I) w/ appropriate HEP for mobility, strengthening, stabilization and functional activities and NMR  LTG  2. Patient will demonstrate good postural positioning of upper quarter and LUE w/ minimal cuing  LTG 3: AROM (L) Shoulder to  allow all ADLs and job duties w/ modifications as needed .   LTG 4: Strength (L) shoulder / UE to allow normal ADLs and job duties w/ modifications as needed.   LTG 5:  SPADI score from 113  to < 30       Plan  Therapy options: will be seen for skilled therapy services  Planned modality interventions: ultrasound, TENS, hydrotherapy, cryotherapy and electrical stimulation/Russian stimulation  Planned therapy interventions: ADL retraining, balance/weight-bearing training, flexibility, functional ROM exercises, home exercise program, IADL retraining, joint mobilization, therapeutic activities, stretching, strengthening, soft tissue mobilization, postural training, neuromuscular re-education and manual therapy  Frequency: 2x week  Duration in weeks: 12  Treatment plan discussed with: patient  Plan details: Progress per Rehab Guidelines -       ___________________________________________________  Manual Therapy:    10     mins  06764;  Therapeutic Exercise:    10     mins  89155;     Neuromuscular Jeremi:        mins  47664;    Therapeutic Activity:     10     mins  41246;   Self Care:                           mins  95938  Ultrasound:          mins  60477;  Iontophoresis:          mins  60054;    Electrical Stimulation:         mins  90977 ( );  Mechanical Traction:          mins  45646  Dry Needling          mins self-pay    Eval:   15   mins    Timed Treatment:   30   mins                  Total Treatment:     45   mins    PT SIGNATURE:     Carlito Conde, YRIS  DATE TREATMENT INITIATED: 3/6/2024  ___________________________________________________  Initial Certification  Certification Period: 6/4/2024  I certify that the therapy services are furnished while this patient is under my care.  The services outlined above are required by this patient, and will be reviewed every 90 days.     PHYSICIAN: ________________________________  DATE: ______  Alverto Fish MD        Please sign and return via fax to [phone]..  Thank you, [facility].  ______________________________________________________________________  [address]  [place] [address]  Phone: [phone] Fax: [phone]

## 2024-03-08 ENCOUNTER — TREATMENT (OUTPATIENT)
Dept: PHYSICAL THERAPY | Facility: CLINIC | Age: 74
End: 2024-03-08
Payer: MEDICARE

## 2024-03-08 DIAGNOSIS — R29.898 WEAKNESS OF LEFT ARM: ICD-10-CM

## 2024-03-08 DIAGNOSIS — M25.612 STIFFNESS OF LEFT SHOULDER JOINT: ICD-10-CM

## 2024-03-08 DIAGNOSIS — R68.89 IMPAIRED FUNCTION OF UPPER EXTREMITY: ICD-10-CM

## 2024-03-08 DIAGNOSIS — Z96.612 STATUS POST REVERSE TOTAL SHOULDER REPLACEMENT, LEFT: Primary | ICD-10-CM

## 2024-03-08 NOTE — PROGRESS NOTES
Physical Therapy Daily Treatment Note    [facility]  [address] [place] [address]  [phone] (phone)  [phone] (fax)    Patient: Gina Gonzalez   : 1950  Diagnosis/ICD-10 Code:  Status post reverse total shoulder replacement, left [Z96.612]  Referring practitioner: Alverto Fish MD  Today's Date: 3/8/2024  Patient seen for 2 sessions    Visit Diagnoses:    ICD-10-CM ICD-9-CM   1. Status post reverse total shoulder replacement, left  Z96.612 V43.61   2. Weakness of left arm  R29.898 729.89   3. Stiffness of left shoulder joint  M25.612 719.51   4. Impaired function of upper extremity  R68.89 V49.5              Subjective Evaluation    History of Present Illness    Subjective comment: Using scarf tied in a loop as my sling.  Bruising is not as bad.  Shoulder aches but I'm able to move it better.       Objective     Manual Techniques:   Gentle PROM ER (in plane of scapula) (L) Shoulder (in allowable range)  Gentle PROM FE  (in plane of scapula) (L) Shoulder (in allowable range)  Gentle STM to L UT and just medial / lateral to incision, gentle scar mobilization     Exercise:   L shoulder ER (in plane of scapula) assisted ROM/stretch using want w/ towel roll under her arm to prevent HE - 20/10 sec ea (in allowable range)  L shoulder elevation (in plane of scapula) assisted ROM/stretch using opp hand to 90-deg - 10/10 sec ea  Scap Retraction: (B) / R-L;  10/10 sec ea  Review of Elbow/FA AROM (flexion/extension, FA pronation/supination) Wrist AROM (flex/ext, circles), opening/closing hand, gripping ball / towel    Education:  Shoulder Anatomy / function w/ rTSA  Posture, Joint protection, ADL modification, etc.    Post op precautions /restrictions including NO shoulder extension / IRB Use of towel roll / pillows for support / positioning to prevent HE in bed/chair, etc.   Not to force ROM into pain with HEP  No active shoulder elevation or lifting objects  heavier than coffee cup/plate       Assessment & Plan       Assessment  Assessment details: Patient returns for first follow up since evaluation with report of improving R UE movement and only min/mod aching in shoulder.  Continued with education and instruction on post op precautions / restrictions and activity modifications at 2 weeks post op.  She demonstrates good shoulder passive motion in allowable range per protocol with minimal discomfort.  PT provided demonstration and verbal / tactile cuing throughout session for optimal posture, shoulder/scapular posture / positioning, and correct form/technique with ex/activity.  Recommended patient continue to ice shoulder following ex, a couple of times during day, and at night.     Plan:  Continue with skilled therapy progressing per post op protocol.                      Timed:    Manual Therapy:   17    mins  01780;  Therapeutic Exercise:    24     mins  50168;   Neuromuscular Jeremi:    -    mins  20769;    Therapeutic Activity:     -     mins  65677;     Gait Training:      -     mins  04506;     Ultrasound:     -     mins  93196;    Aquatic Therapy    -     mins 51842;  Self Care                       -     mins   10709        Timed Treatment:   41   mins   Total Treatment:     41   mins    Gauri Graham PT  Physical Therapist    [place] License:  [id number]

## 2024-03-11 ENCOUNTER — TELEPHONE (OUTPATIENT)
Dept: PHYSICAL THERAPY | Facility: CLINIC | Age: 74
End: 2024-03-11

## 2024-03-11 NOTE — TELEPHONE ENCOUNTER
Caller: Gina Gonzalez    Relationship: Self       What was the call regarding: CAN NOT MAKE IT IN

## 2024-03-14 ENCOUNTER — TREATMENT (OUTPATIENT)
Dept: PHYSICAL THERAPY | Facility: CLINIC | Age: 74
End: 2024-03-14
Payer: MEDICARE

## 2024-03-14 DIAGNOSIS — R29.898 WEAKNESS OF LEFT ARM: ICD-10-CM

## 2024-03-14 DIAGNOSIS — M25.612 STIFFNESS OF LEFT SHOULDER JOINT: ICD-10-CM

## 2024-03-14 DIAGNOSIS — R68.89 IMPAIRED FUNCTION OF UPPER EXTREMITY: ICD-10-CM

## 2024-03-14 DIAGNOSIS — Z96.612 STATUS POST REVERSE TOTAL SHOULDER REPLACEMENT, LEFT: Primary | ICD-10-CM

## 2024-03-18 NOTE — PROGRESS NOTES
Physical Therapy Daily Note    Patient Name: Gina Gonzalez         :  1950  Referring Physician: Alverto Fish MD  Treatment Date: 3/14/2024  Date of Initial Visit: Type: THERAPY  Noted: 3/6/2024    Visit Diagnoses:    ICD-10-CM ICD-9-CM   1. Status post reverse total shoulder replacement, left  Z96.612 V43.61   2. Weakness of left arm  R29.898 729.89   3. Stiffness of left shoulder joint  M25.612 719.51   4. Impaired function of upper extremity  R68.89 V49.5     Patient seen for 3 sessions  _____________________________________________________  Pt arrived 20 minutes late -     Subjective   Gina Gonzalez reports: after PT Friday, she pulled up her pants with her (L) arm and felt/heard a loud and extremely painful pop in her (L) shoulder w/ increased pain, loss of mobility and increased swelling in (L) shoulder / arm - HEP very painful now - Notes PT on Friday went very well -   She reports she did not contact her surgeon after this happened -     Objective   (L) shoulder / delto-pec region swollen - Pt (L) arm in a scarf sling -   PROM Shoulder ER(plane of scap good mobility, but more pain - FE limited to < 90-deg w/ increased pain -   Palpation - Pt more tender anterior shoulder/delto-pec region - Difficult to palpate for possible dislocation due to discomfort / swelling -     Messaged Dr. Fish and Denae called his office and she was told to come to the office tomorrow (Friday) for assessment -       See Exercise, Manual, and Modality Logs for complete treatment.     Functional / Therapeutic Activities:    TAPING / BRACING: NA  Shoulder assessment -   Pt advised to wear her proper sling and hold off on activities until she is assessed by Dr. Fish tomorrow - and reminded again why sling is important and avoid HE, IRB of (L) shoulder and no active FE, lifting items of wt to/above shoulder level and nothing heavier than plate -     Assessment/Plan  74 y.o. year-old female s/p (L) shoulder reverse TSA on  2/21/2024 .  Used (L) arm/shoulder to pull her pants up on Friday 3/08/2024 and had/felt a loud / painful pop with increased pain and loss of mobility since -   Would benefit from assessment by Dr. Fish to rule out possible dislocation (L) shoulder  -     Pt to see Dr. Fish tomorrow 03/15/2024 for assessment to rule out possible dislocation -   Hold PT until after she sees Dr. Fish and cleared for PT again -        _________________________________________________    Manual Therapy:                 mins  03275;  Therapeutic Exercise:        mins  20689;     Neuromuscular Jeremi:        mins  84859;    Therapeutic Activity:     23      mins  74138;     Ultrasound:                          mins  69772;  Iontophoresis:                     mins  75657;    Electrical Stimulation:         mins  14816 ( );  Mechanical Traction:          mins  84901  Dry Needling                       mins self-pay     Timed Treatment:   23    mins                  Total Treatment:     23    mins        Carlito Conde, PT  Physical Therapist  Lic # [id number]

## 2024-03-20 ENCOUNTER — TELEPHONE (OUTPATIENT)
Dept: ORTHOPEDIC SURGERY | Facility: HOSPITAL | Age: 74
End: 2024-03-20
Payer: MEDICARE

## 2024-03-20 NOTE — TELEPHONE ENCOUNTER
Attempted to reach Ms. Gonzalez to see how she has been doing since her LTSRA 2/21. Message left at this time.

## 2024-03-25 RX ORDER — ATORVASTATIN CALCIUM 20 MG/1
TABLET, FILM COATED ORAL
Qty: 90 TABLET | Refills: 3 | Status: SHIPPED | OUTPATIENT
Start: 2024-03-25

## 2024-05-13 NOTE — PROGRESS NOTES
"Physical Therapy Re-evaluation     [facility]  [address] [place] [address]  [phone] (phone)  [phone] (fax)      Patient: Gina Gonzalez   : 1950  Diagnosis/ICD-10 Code:  Status post reverse total shoulder replacement, left [Z96.612]  Referring practitioner: Alverto Fish MD  Date of Initial Visit: Type: THERAPY  Noted: 3/6/2024  Today's Date: 2024  Patient seen for 4 sessions    Visit Diagnoses:    ICD-10-CM ICD-9-CM   1. Status post reverse total shoulder replacement, left  Z96.612 V43.61   2. Closed nondisplaced fracture of coracoid process of left shoulder with routine healing, subsequent encounter  S42.135D V54.11         Subjective:     Clinical Progress: improved  Home Program Compliance: N/A  Treatment has included:  therapeutic exercise, manual therapy, therapeutic activity, neuro-muscular retraining , and patient education with home exercise program     Subjective   Pt had a rTSA on 2024. She fell on 2024 and dislocated her shoulder. She went to a few PT sessions (~3) and then pulled her pants up and fractured her coracoid process. Her orthopedic doctor said to wait 8 weeks before coming back to PT. She went to Dr. Fish yesterday and he said her Fx is healed. Pt states that the doctor told her to not lift more than 5 pounds with her L hand, no pushing or pulling.    She states she has ROM limitations with flexion, functional IR. She also has difficulty and soreness with reaching across her body and with sleeping at night. She states sleeping on her L shoulder is sore.     Since the last time she was here, she feels she is doing better--better ROM.    Objective          Active Range of Motion   Left Shoulder   Flexion: 154 degrees   Abduction: 95 (\"popping\" on the way back) degrees   External rotation 45°: 64 degrees with pain  Internal rotation 45°: 58 (prior to humerus shifting anteriorly) degrees     Additional Active " Range of Motion Details  Functional IR  L: T12--discomfort  R: T10     Functional ER  L: T1  R: T4    Strength/Myotome Testing     Left Shoulder     Planes of Motion   Flexion: 4   Abduction: 4-   External rotation at 0°: 4-   Internal rotation at 0°: 4-     Right Shoulder     Planes of Motion   Flexion: 4+   Abduction: 4-   External rotation at 0°: 4+   Internal rotation at 0°: 4     Left Elbow   Flexion: 5    Right Elbow   Flexion: 5      See Exercise, Manual, and Modality Logs for complete treatment.          SPADI: 6/130=5% (Initial eval: 113)      Assessment/Plan  Gina Gonzalez is a 74 y.o. female who has attended 3 therapy sessions since her evaluation on 3/6/2024 for treatment following (L) shoulder reverse TSA on 2/21/2024. Following those 3 sessions, pt fractured her coracoid process in mid March when pulling up her pants. Her orthopedic surgeon recommended an 8 week break from PT to allow fracture to heal. 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 Pt has met 4/9 goals. Pt demonstrates improved AROM and strength. She also has improved functional ability as indicated by her score on the SPADI. She continues to demonstrate limited ROM, weakness, and impaired functional ability. Pt would benefit from continued skilled physical therapy to address these impairments.        Goals  Plan Goals: PT Goal Recert Due Date: 90 days     SHORT TERM GOALS: 6 weeks  STG 1.  Initiation of appropriate HEP for shoulder per rehab guidelines-. MET  STG 2  Pt instructed in and voices knowledge of precautions including positioning of (L) shoulder to prevent dislocation and no active motion or lifting items > a cup/plate, etc ONGOING  STG 3. PROM FE to 120-deg;  ER (pos) to 45 (per guidelines)  MET  STG 4: SPADI Score decreased > 10pts MET      LONG TERM GOALS: 12 weeks (or at time of DISCHARGE)   LTG 1. Pt (I) w/ appropriate HEP for mobility, strengthening, stabilization and functional activities and NMR ONGOING  LTG  2. Patient will demonstrate good postural positioning of upper quarter and LUE w/ minimal cuing ONGOING  LTG 3: AROM (L) Shoulder to allow all ADLs and job duties w/ modifications as needed . ONGOING  LTG 4: Strength (L) shoulder / UE to allow normal ADLs and job duties w/ modifications as needed. ONGOING  LTG 5:  SPADI score from 113  to < 30 MET      Recommendations: Continue as planned  Timeframe: 6 weeks  Prognosis to achieve goals: good    PT Signature: Sima Pablo PT    [place] License Number: [id number]    Electronically signed by Sima Pablo PT, 24, 4:53 PM EDT      Based upon review of the patient's progress and continued therapy plan, it is my medical opinion that Gina Gonzalez should continue physical therapy treatment at [facility]  [address] [place] [address] [address] [place] [address] [phone]. office phone  [phone] office fax      Re-evaluation  Certification Period: 2024  I certify that the therapy services are furnished while this patient is under my care.  The services outlined above are required by this patient, and will be reviewed every 90 days.     PHYSICIAN: Alverto Fish MD   NPI: [id number]                                         DATE:     Signature: __________________________________  Alverto Fish MD      Please sign and return via fax to [phone]   Thank you, [facility].    Timed:  Manual Therapy:    0     mins  17531;  Therapeutic Exercise:    0     mins  67322;     Neuromuscular Jeremi:    0    mins  09516;    Therapeutic Activity:     42     mins  96594;     Gait Trainin     mins  71627;     Ultrasound:     0     mins  42053;    Orthotic Mgmt/training  0     mins 34458;  Orthotic check out  0     mins  41024;  Aquatic Therapy    0     mins 67139;  Self Care                       0     mins   61766      Untimed:  Electrical Stimulation:    0     mins  31340 ( );  Traction:    0     mins  91043;   Dry Needling  (1-2 muscles)            0     mins 20560 (Self-pay)  Dry Needling (3-4 muscles) 0     mins 20561 (Self-pay)  Dry Needling Trial    0     mins DRYNDLTRIAL  (No Charge)  Canalith Repositioning  0     mins 88785;    :  Timed Treatment:   42   mins   Total Treatment:     42   mins

## 2024-05-14 ENCOUNTER — TREATMENT (OUTPATIENT)
Dept: PHYSICAL THERAPY | Facility: CLINIC | Age: 74
End: 2024-05-14
Payer: MEDICARE

## 2024-05-14 DIAGNOSIS — Z96.612 STATUS POST REVERSE TOTAL SHOULDER REPLACEMENT, LEFT: Primary | ICD-10-CM

## 2024-05-14 DIAGNOSIS — S42.135D CLOSED NONDISPLACED FRACTURE OF CORACOID PROCESS OF LEFT SHOULDER WITH ROUTINE HEALING, SUBSEQUENT ENCOUNTER: ICD-10-CM

## 2024-05-14 PROCEDURE — 97530 THERAPEUTIC ACTIVITIES: CPT

## 2024-05-14 NOTE — PROGRESS NOTES
Physical Therapy Daily Treatment Note    [facility]  [address] [place] [address]  [phone] (phone)  [phone] (fax)    Patient: Gina Gonzalez   : 1950  Diagnosis/ICD-10 Code:  Status post reverse total shoulder replacement, left [Z96.612]  Referring practitioner: Alverto Fish MD  Today's Date: 2024  Patient seen for 5 sessions    Visit Diagnoses:    ICD-10-CM ICD-9-CM   1. Status post reverse total shoulder replacement, left  Z96.612 V43.61   2. Closed nondisplaced fracture of coracoid process of left shoulder with routine healing, subsequent encounter  S42.769D V54.11              Subjective   Pt states that her shoulder is feeling pretty good. She states that she wasn't sore after our last visit as anticipated. Pt states her biggest struggle is sleeping at night.    Objective   See Exercise, Manual, and Modality Logs for complete treatment.     THEREX/THERACT/NEURO  - Shoulder flexion stretch w/ dowel 10 sec hold x5  - Shoulder ER stretch w/ dowel 5 sec hold x10    MANUAL THERAPY  - Manual stretching into flex, scaption, ER at 45 degrees, IR degrees at 45 degrees  - Cross friction to scar  - STM to deltoid, biceps, RC mm     Assessment/Plan  Today was pt's first treatment session following re-eval. Reviewed pt's HEP given to her after her surgery to ensure proper form. Continue PT POC.          Timed:    Manual Therapy:    23     mins  02211;  Therapeutic Exercise:    15     mins  66215;     Neuromuscular Jeremi:    0    mins  23457;    Therapeutic Activity:     0     mins  40197;     Gait Trainin     mins  78092;     Ultrasound:     0     mins  28302;    Aquatic Therapy    0     mins 43148;  Self Care                       0     mins   09537        Untimed:  Electrical Stimulation:    0     mins  75431 ( );  Traction:    0     mins  51849;   Dry Needling  (1-2 muscles)            0     mins 66926 (Self-pay)  Dry Needling (3-4  muscles) 0     49168 (Self-pay)  Dry Needling Trial    0     DRYNDLTRIAL  (No Charge)    Timed Treatment:   38   mins   Total Treatment:     38   mins    Sima Pablo PT  Physical Therapist    [place] License:[id number]

## 2024-05-16 ENCOUNTER — TREATMENT (OUTPATIENT)
Dept: PHYSICAL THERAPY | Facility: CLINIC | Age: 74
End: 2024-05-16
Payer: MEDICARE

## 2024-05-16 DIAGNOSIS — S42.135D CLOSED NONDISPLACED FRACTURE OF CORACOID PROCESS OF LEFT SHOULDER WITH ROUTINE HEALING, SUBSEQUENT ENCOUNTER: ICD-10-CM

## 2024-05-16 DIAGNOSIS — Z96.612 STATUS POST REVERSE TOTAL SHOULDER REPLACEMENT, LEFT: Primary | ICD-10-CM

## 2024-05-22 DIAGNOSIS — E78.49 OTHER HYPERLIPIDEMIA: Primary | ICD-10-CM

## 2024-05-22 DIAGNOSIS — I10 ESSENTIAL HYPERTENSION: ICD-10-CM

## 2024-05-23 LAB
ALBUMIN SERPL-MCNC: 4.6 G/DL (ref 3.5–5.2)
ALBUMIN/GLOB SERPL: 1.7 G/DL
ALP SERPL-CCNC: 97 U/L (ref 39–117)
ALT SERPL-CCNC: 21 U/L (ref 1–33)
APPEARANCE UR: CLEAR
AST SERPL-CCNC: 22 U/L (ref 1–32)
BACTERIA #/AREA URNS HPF: ABNORMAL /HPF
BASOPHILS # BLD AUTO: 0.04 10*3/MM3 (ref 0–0.2)
BASOPHILS NFR BLD AUTO: 0.9 % (ref 0–1.5)
BILIRUB SERPL-MCNC: 0.7 MG/DL (ref 0–1.2)
BILIRUB UR QL STRIP: NEGATIVE
BUN SERPL-MCNC: 23 MG/DL (ref 8–23)
BUN/CREAT SERPL: 28.4 (ref 7–25)
CALCIUM SERPL-MCNC: 9.8 MG/DL (ref 8.6–10.5)
CASTS URNS MICRO: ABNORMAL
CHLORIDE SERPL-SCNC: 105 MMOL/L (ref 98–107)
CHOLEST SERPL-MCNC: 207 MG/DL (ref 0–200)
CO2 SERPL-SCNC: 26.4 MMOL/L (ref 22–29)
COLOR UR: YELLOW
CREAT SERPL-MCNC: 0.81 MG/DL (ref 0.57–1)
EGFRCR SERPLBLD CKD-EPI 2021: 76.8 ML/MIN/1.73
EOSINOPHIL # BLD AUTO: 0.14 10*3/MM3 (ref 0–0.4)
EOSINOPHIL NFR BLD AUTO: 3 % (ref 0.3–6.2)
EPI CELLS #/AREA URNS HPF: ABNORMAL /HPF
ERYTHROCYTE [DISTWIDTH] IN BLOOD BY AUTOMATED COUNT: 12.9 % (ref 12.3–15.4)
GLOBULIN SER CALC-MCNC: 2.7 GM/DL
GLUCOSE SERPL-MCNC: 99 MG/DL (ref 65–99)
GLUCOSE UR QL STRIP: NEGATIVE
HCT VFR BLD AUTO: 46.8 % (ref 34–46.6)
HDLC SERPL-MCNC: 105 MG/DL (ref 40–60)
HGB BLD-MCNC: 15.4 G/DL (ref 12–15.9)
HGB UR QL STRIP: NEGATIVE
IMM GRANULOCYTES # BLD AUTO: 0.02 10*3/MM3 (ref 0–0.05)
IMM GRANULOCYTES NFR BLD AUTO: 0.4 % (ref 0–0.5)
KETONES UR QL STRIP: NEGATIVE
LDLC SERPL CALC-MCNC: 90 MG/DL (ref 0–100)
LEUKOCYTE ESTERASE UR QL STRIP: ABNORMAL
LYMPHOCYTES # BLD AUTO: 2 10*3/MM3 (ref 0.7–3.1)
LYMPHOCYTES NFR BLD AUTO: 43.5 % (ref 19.6–45.3)
MCH RBC QN AUTO: 30.3 PG (ref 26.6–33)
MCHC RBC AUTO-ENTMCNC: 32.9 G/DL (ref 31.5–35.7)
MCV RBC AUTO: 91.9 FL (ref 79–97)
MONOCYTES # BLD AUTO: 0.52 10*3/MM3 (ref 0.1–0.9)
MONOCYTES NFR BLD AUTO: 11.3 % (ref 5–12)
NEUTROPHILS # BLD AUTO: 1.88 10*3/MM3 (ref 1.7–7)
NEUTROPHILS NFR BLD AUTO: 40.9 % (ref 42.7–76)
NITRITE UR QL STRIP: NEGATIVE
NRBC BLD AUTO-RTO: 0 /100 WBC (ref 0–0.2)
PH UR STRIP: 6 [PH] (ref 5–8)
PLATELET # BLD AUTO: 272 10*3/MM3 (ref 140–450)
POTASSIUM SERPL-SCNC: 4.6 MMOL/L (ref 3.5–5.2)
PROT SERPL-MCNC: 7.3 G/DL (ref 6–8.5)
PROT UR QL STRIP: NEGATIVE
RBC # BLD AUTO: 5.09 10*6/MM3 (ref 3.77–5.28)
RBC #/AREA URNS HPF: ABNORMAL /HPF
SODIUM SERPL-SCNC: 141 MMOL/L (ref 136–145)
SP GR UR STRIP: 1.02 (ref 1–1.03)
TRIGL SERPL-MCNC: 67 MG/DL (ref 0–150)
TSH SERPL DL<=0.005 MIU/L-ACNC: 2.28 UIU/ML (ref 0.27–4.2)
UROBILINOGEN UR STRIP-MCNC: ABNORMAL MG/DL
VLDLC SERPL CALC-MCNC: 12 MG/DL (ref 5–40)
WBC # BLD AUTO: 4.6 10*3/MM3 (ref 3.4–10.8)
WBC #/AREA URNS HPF: ABNORMAL /HPF

## 2024-05-24 ENCOUNTER — TREATMENT (OUTPATIENT)
Dept: PHYSICAL THERAPY | Facility: CLINIC | Age: 74
End: 2024-05-24
Payer: MEDICARE

## 2024-05-24 DIAGNOSIS — Z96.612 STATUS POST REVERSE TOTAL SHOULDER REPLACEMENT, LEFT: Primary | ICD-10-CM

## 2024-05-24 DIAGNOSIS — S42.135D CLOSED NONDISPLACED FRACTURE OF CORACOID PROCESS OF LEFT SHOULDER WITH ROUTINE HEALING, SUBSEQUENT ENCOUNTER: ICD-10-CM

## 2024-05-24 NOTE — PROGRESS NOTES
"Physical Therapy Daily Treatment Note    [facility]  [address] [place] [address]  [phone] (phone)  [phone] (fax)    Patient: Gina Gonzalez   : 1950  Diagnosis/ICD-10 Code:  Status post reverse total shoulder replacement, left [Z96.612]  Referring practitioner: Alverto Fish MD  Today's Date: 2024  Patient seen for 6 sessions    Visit Diagnoses:    ICD-10-CM ICD-9-CM   1. Status post reverse total shoulder replacement, left  Z96.612 V43.61   2. Closed nondisplaced fracture of coracoid process of left shoulder with routine healing, subsequent encounter  S42.135D V54.11              Subjective I am doing my exercise    When it hurts I am afraid I am doing to hurt it     Objective     THEREX/ NMREd     1- V pattern RHYTHMIC WORK ar the mirror   Start with elbows by your side    PALMS UP   Reach to the mirror GOAL shoulder height   SHOULDER BLADES DOWN/   neck long    Elbows locked    RHYTHMIC WORK     in/ou    up/down   GOAL 1 min   3x/day    2- SHOWER     \"Wash your back \"   \"Pat your back \"    3- MARCHING at the mirror    Head may not move side to side     Isolate and make your hips do it     4- EVERY TIME  you stand up to walk   ADD arm swing with focs on going BACK with arms       MINDFUL ALL DAY for the LEFT arm to move as normally as possible      Verbal cueing for proper technique for all         Assessment/Plan  A: improved scapulo humeral rhythm with scaption and rhythmic work for strengthening   Did well with integration into ADL reaching with good scapulo humeral rhythm   PLAN progress strengthening with verbal cueing for scapulo humeral rhythm           Timed:    Manual Therapy:    0     mins  14639;  Therapeutic Exercise:    30     mins  05895;     Neuromuscular Jeremi:    10    mins  42893;    Therapeutic Activity:     0     mins  93135;     Gait Trainin     mins  96176;     Ultrasound:     0     mins  29087;    Aquatic " Therapy    0     mins 53944;  Self Care                       0     mins   40411        Untimed:  Electrical Stimulation:    0     mins  66376 ( );  Traction:    0     mins  55012;   Dry Needling  (1-2 muscles)            0     mins 20560 (Self-pay)  Dry Needling (3-4 muscles) 0     20561 (Self-pay)  Dry Needling Trial    0     DRYNDLTRIAL  (No Charge)    Timed Treatment:   40   mins   Total Treatment:     40   mins    Marleny Parry PT  Physical Therapist    [place] License:[id number]

## 2024-05-28 ENCOUNTER — TRANSCRIBE ORDERS (OUTPATIENT)
Dept: INTERNAL MEDICINE | Facility: CLINIC | Age: 74
End: 2024-05-28
Payer: MEDICARE

## 2024-05-28 DIAGNOSIS — Z12.31 VISIT FOR SCREENING MAMMOGRAM: Primary | ICD-10-CM

## 2024-05-29 ENCOUNTER — OFFICE VISIT (OUTPATIENT)
Dept: INTERNAL MEDICINE | Facility: CLINIC | Age: 74
End: 2024-05-29
Payer: MEDICARE

## 2024-05-29 VITALS
HEIGHT: 66 IN | WEIGHT: 192 LBS | DIASTOLIC BLOOD PRESSURE: 84 MMHG | BODY MASS INDEX: 30.86 KG/M2 | OXYGEN SATURATION: 98 % | HEART RATE: 63 BPM | SYSTOLIC BLOOD PRESSURE: 128 MMHG

## 2024-05-29 DIAGNOSIS — Z00.00 MEDICARE ANNUAL WELLNESS VISIT, SUBSEQUENT: Primary | ICD-10-CM

## 2024-05-29 DIAGNOSIS — Z00.00 WELL ADULT EXAM: ICD-10-CM

## 2024-05-29 DIAGNOSIS — Z78.0 MENOPAUSE: ICD-10-CM

## 2024-05-29 DIAGNOSIS — D32.0 CEREBRAL MENINGIOMA: ICD-10-CM

## 2024-05-29 NOTE — PATIENT INSTRUCTIONS
Medicare Wellness  Personal Prevention Plan of Service     Date of Office Visit:    Encounter Provider:  Lisa Malone MD  Place of Service:  [facility] PR[facility]  Patient Name: Gina HIGUERA:  1950    As part of the Medicare Wellness portion of your visit today, we are providing you with this personalized preventive plan of services (PPPS). This plan is based upon recommendations of the [place] Preventive Services Task Force (USPSTF) and the Advisory Committee on Immunization Practices (ACIP).    This lists the preventive care services that should be considered, and provides dates of when you are due. Items listed as completed are up-to-date and do not require any further intervention.    Health Maintenance   Topic Date Due    RSV Vaccine - Adults (1 - 1-dose 60+ series) Never done    ZOSTER VACCINE (2 of 3) 2013    DXA SCAN  2023    COVID-19 Vaccine (4 - -24 season) 2023    ANNUAL WELLNESS VISIT  2024    MAMMOGRAM  2024    INFLUENZA VACCINE  2024    BMI FOLLOWUP  2025    LIPID PANEL  2025    TDAP/TD VACCINES (2 - Td or Tdap) 2029    COLORECTAL CANCER SCREENING  2029    Pneumococcal Vaccine 65+  Completed    HEPATITIS C SCREENING  Addressed       No orders of the defined types were placed in this encounter.      No follow-ups on file.        Fall Prevention in the Home, Adult  Falls can cause injuries and affect people of all ages. There are many simple things that you can do to make your home safe and to help prevent falls.  If you need it, ask for help making these changes.  What actions can I take to prevent falls?  General information  Use good lighting in all rooms. Make sure to:  Replace any light bulbs that burn out.  Turn on lights if it is dark and use night-lights.  Keep items that you use often in easy-to-reach places. Lower the shelves around your home if needed.  Move furniture so that there are  clear paths around it.  Do not keep throw rugs or other things on the floor that can make you trip.  If any of your floors are uneven, fix them.  Add color or contrast paint or tape to clearly caleb and help you see:  Grab bars or handrails.  First and last steps of staircases.  Where the edge of each step is.  If you use a ladder or stepladder:  Make sure that it is fully opened. Do not climb a closed ladder.  Make sure the sides of the ladder are locked in place.  Have someone hold the ladder while you use it.  Know where your pets are as you move through your home.  What can I do in the bathroom?         Keep the floor dry. Clean up any water that is on the floor right away.  Remove soap buildup in the bathtub or shower. Buildup makes bathtubs and showers slippery.  Use non-skid mats or decals on the floor of the bathtub or shower.  Attach bath mats securely with double-sided, non-slip rug tape.  If you need to sit down while you are in the shower, use a non-slip stool.  Install grab bars by the toilet and in the bathtub and shower. Do not use towel bars as grab bars.  What can I do in the bedroom?  Make sure that you have a light by your bed that is easy to reach.  Do not use any sheets or blankets on your bed that hang to the floor.  Have a firm bench or chair with side arms that you can use for support when you get dressed.  What can I do in the kitchen?  Clean up any spills right away.  If you need to reach something above you, use a sturdy step stool that has a grab bar.  Keep electrical cables out of the way.  Do not use floor polish or wax that makes floors slippery.  What can I do with my stairs?  Do not leave anything on the stairs.  Make sure that you have a light switch at the top and the bottom of the stairs. Have them installed if you do not have them.  Make sure that there are handrails on both sides of the stairs. Fix handrails that are broken or loose. Make sure that handrails are as long as the  staircases.  Install non-slip stair treads on all stairs in your home if they do not have carpet.  Avoid having throw rugs at the top or bottom of stairs, or secure the rugs with carpet tape to prevent them from moving.  Choose a carpet design that does not hide the edge of steps on the stairs. Make sure that carpet is firmly attached to the stairs. Fix any carpet that is loose or worn.  What can I do on the outside of my home?  Use bright outdoor lighting.  Repair the edges of walkways and driveways and fix any cracks. Clear paths of anything that can make you trip, such as tools or rocks.  Add color or contrast paint or tape to clearly caleb and help you see high doorway thresholds.  Trim any bushes or trees on the main path into your home.  Check that handrails are securely fastened and in good repair. Both sides of all steps should have handrails.  Install guardrails along the edges of any raised decks or porches.  Have leaves, snow, and ice cleared regularly. Use sand, salt, or ice melt on walkways during winter months if you live where there is ice and snow.  In the garage, clean up any spills right away, including grease or oil spills.  What other actions can I take?  Review your medicines with your health care provider. Some medicines can make you confused or feel dizzy. This can increase your chance of falling.  Wear closed-toe shoes that fit well and support your feet. Wear shoes that have rubber soles and low heels.  Use a cane, walker, scooter, or crutches that help you move around if needed.  Talk with your provider about other ways that you can decrease your risk of falls. This may include seeing a physical therapist to learn to do exercises to improve movement and strength.  Where to find more information  Centers for Disease Control and Prevention, STEADI: cdc.gov  National [place] on Aging: rocael.nih.gov  National [place] on Aging: rocael.nih.gov  Contact a health care provider if:  You are afraid of  falling at home.  You feel weak, drowsy, or dizzy at home.  You fall at home.  Get help right away if you:  Lose consciousness or have trouble moving after a fall.  Have a fall that causes a head injury.  These symptoms may be an emergency. Get help right away. Call 911.  Do not wait to see if the symptoms will go away.  Do not drive yourself to the hospital.  This information is not intended to replace advice given to you by your health care provider. Make sure you discuss any questions you have with your health care provider.  Document Revised: 2023 Document Reviewed: 2023  [employer] Patient Education ©  [employer]    Medicare Wellness  Personal Prevention Plan of Service     Date of Office Visit:    Encounter Provider:  Lisa Malone MD  Place of Service:  [facility] PRIMARY CARE  Patient Name: Gina Gonzalez  :  1950    As part of the Medicare Wellness portion of your visit today, we are providing you with this personalized preventive plan of services (PPPS). This plan is based upon recommendations of the [place] Preventive Services Task Force (USPSTF) and the Advisory Committee on Immunization Practices (ACIP).    This lists the preventive care services that should be considered, and provides dates of when you are due. Items listed as completed are up-to-date and do not require any further intervention.    Health Maintenance   Topic Date Due    RSV Vaccine - Adults (1 - 1-dose 60+ series) Never done    ZOSTER VACCINE (2 of 3) 2013    DXA SCAN  2023    COVID-19 Vaccine ( -  season) 2023    ANNUAL WELLNESS VISIT  2024    MAMMOGRAM  2024    INFLUENZA VACCINE  2024    BMI FOLLOWUP  2025    LIPID PANEL  2025    TDAP/TD VACCINES (2 - Td or Tdap) 2029    COLORECTAL CANCER SCREENING  2029    Pneumococcal Vaccine 65+  Completed    HEPATITIS C SCREENING  Addressed       Orders Placed This Encounter    Procedures    DEXA Bone Density Axial     Standing Status:   Future     Order Specific Question:   Is patient taking or have taken long term Glucocorticoid (steroids)?     Answer:   No     Order Specific Question:   Does the patient have rheumatoid arthritis?     Answer:   No     Order Specific Question:   Reason for Exam:     Answer:   screen     Order Specific Question:   Does this patient have a diabetic monitoring/medication delivering device on?     Answer:   No     Order Specific Question:   Release to patient     Answer:   Routine Release [[id number]]     Order Specific Question:   Does the patient have secondary osteoporosis?     Answer:   No       Return in about 1 year (around 5/29/2025).

## 2024-05-29 NOTE — PROGRESS NOTES
The ABCs of the Annual Wellness Visit  Subsequent Medicare Wellness Visit    Subjective    Gina Gonzalez is a 74 y.o. female who presents for a Subsequent Medicare Wellness Visit.    The following portions of the patient's history were reviewed and   updated as appropriate: allergies, current medications, past family history, past medical history, past social history, past surgical history, and problem list.    Compared to one year ago, the patient feels her physical   health is the same.    Compared to one year ago, the patient feels her mental   health is the same.    Recent Hospitalizations:  This patient has had a [facility] admission record on file within the last 365 days.    Current Medical Providers:  Patient Care Team:  Lisa Malone MD as PCP - General (Internal Medicine)    Outpatient Medications Prior to Visit   Medication Sig Dispense Refill    atorvastatin (LIPITOR) 20 MG tablet TAKE ONE TABLET BY MOUTH DAILY 90 tablet 3    Cholecalciferol 25 MCG (1000 UT) tablet Take 1 tablet by mouth Daily.      dicyclomine (BENTYL) 20 MG tablet Take 1 tablet by mouth Every 8 (Eight) Hours As Needed (abdominal pain). 20 tablet 0    DULoxetine (Cymbalta) 30 MG capsule Take 1 capsule by mouth Daily. 30 capsule 5    lisinopril (PRINIVIL,ZESTRIL) 10 MG tablet TAKE 1 TABLET BY MOUTH DAILY 90 tablet 1    vitamin B-12 (VITAMIN B-12) 1000 MCG tablet Take 1 tablet by mouth Daily.      ondansetron (Zofran) 4 MG tablet Take 1 tablet by mouth Every 8 (Eight) Hours As Needed for Nausea or Vomiting. 30 tablet 0    oxyCODONE-acetaminophen (PERCOCET) 5-325 MG per tablet Take 1 tablet by mouth Every 4 (Four) Hours As Needed for Moderate Pain. (Patient not taking: Reported on 5/29/2024) 30 tablet 0     No facility-administered medications prior to visit.       No opioid medication identified on active medication list. I have reviewed chart for other potential  high risk medication/s and harmful drug interactions in the  "elderly.        Aspirin is not on active medication list.  Aspirin use is not indicated based on review of current medical condition/s. Risk of harm outweighs potential benefits.  .    Patient Active Problem List   Diagnosis    Primary osteoarthritis of both hips    Other hyperlipidemia    Essential hypertension    Anxiety    Cerebral meningioma    Shoulder arthritis     Advance Care Planning   Advance Care Planning     Advance Directive is not on file.  ACP discussion was held with the patient during this visit. Patient has an advance directive (not in EMR), copy requested.     Objective    Vitals:    24 1355   BP: 128/84   Pulse: 63   SpO2: 98%   Weight: 87.1 kg (192 lb)   Height: 167.6 cm (65.98\")   PainSc: 0-No pain     Estimated body mass index is 31 kg/m² as calculated from the following:    Height as of this encounter: 167.6 cm (65.98\").    Weight as of this encounter: 87.1 kg (192 lb).           Does the patient have evidence of cognitive impairment? No    Lab Results   Component Value Date    CHLPL 207 (H) 2024    TRIG 67 2024     (H) 2024    LDL 90 2024    VLDL 12 2024        HEALTH RISK ASSESSMENT    Smoking Status:  Social History     Tobacco Use   Smoking Status Former    Current packs/day: 0.00    Types: Cigarettes    Quit date: 1980    Years since quittin.4   Smokeless Tobacco Never     Alcohol Consumption:  Social History     Substance and Sexual Activity   Alcohol Use Yes    Alcohol/week: 7.0 standard drinks of alcohol    Types: 7 Glasses of wine per week     Fall Risk Screen:    RADHA Fall Risk Assessment was completed, and patient is at HIGH risk for falls. Assessment completed on:2024    Depression Screenin/29/2024     1:51 PM   PHQ-2/PHQ-9 Depression Screening   Little Interest or Pleasure in Doing Things 0-->not at all   Feeling Down, Depressed or Hopeless 0-->not at all   PHQ-9: Brief Depression Severity Measure Score 0 "       Health Habits and Functional and Cognitive Screenin/29/2024     1:53 PM   Functional & Cognitive Status   Do you have difficulty preparing food and eating? No   Do you have difficulty bathing yourself, getting dressed or grooming yourself? No   Do you have difficulty using the toilet? No   Do you have difficulty moving around from place to place? No   Do you have trouble with steps or getting out of a bed or a chair? No   Current Diet Well Balanced Diet   Dental Exam Up to date   Eye Exam Not up to date   Exercise (times per week) 3 times per week   Current Exercises Include Walking   Do you need help using the phone?  No   Are you deaf or do you have serious difficulty hearing?  No   Do you need help to go to places out of walking distance? No   Do you need help shopping? No   Do you need help preparing meals?  No   Do you need help with housework?  No   Do you need help with laundry? No   Do you need help taking your medications? No   Do you need help managing money? No   Do you ever drive or ride in a car without wearing a seat belt? No   Have you felt unusual stress, anger or loneliness in the last month? Yes   Who do you live with? Spouse   If you need help, do you have trouble finding someone available to you? No   Have you been bothered in the last four weeks by sexual problems? No   Do you have difficulty concentrating, remembering or making decisions? No       Age-appropriate Screening Schedule:  Refer to the list below for future screening recommendations based on patient's age, sex and/or medical conditions. Orders for these recommended tests are listed in the plan section. The patient has been provided with a written plan.    Health Maintenance   Topic Date Due    RSV Vaccine - Adults (1 - 1-dose 60+ series) Never done    ZOSTER VACCINE (2 of 3) 2013    DXA SCAN  2023    COVID-19 Vaccine (2023- season) 2023    ANNUAL WELLNESS VISIT  2024    MAMMOGRAM   "07/12/2024    INFLUENZA VACCINE  08/01/2024    BMI FOLLOWUP  02/21/2025    LIPID PANEL  05/22/2025    TDAP/TD VACCINES (2 - Td or Tdap) 02/28/2029    COLORECTAL CANCER SCREENING  05/24/2029    Pneumococcal Vaccine 65+  Completed    HEPATITIS C SCREENING  Addressed                  CMS Preventative Services Quick Reference  Risk Factors Identified During Encounter  Immunizations Discussed/Encouraged: Shingrix, COVID19, and RSV (Respiratory Syncytial Virus)  The above risks/problems have been discussed with the patient.  Pertinent information has been shared with the patient in the After Visit Summary.  An After Visit Summary and PPPS were made available to the patient.    Follow Up:   Next Medicare Wellness visit to be scheduled in 1 year.       Additional E&M Note during same encounter follows:  Patient has multiple medical problems which are significant and separately identifiable that require additional work above and beyond the Medicare Wellness Visit.      Chief Complaint  Medicare Wellness-subsequent    Subjective        HPI  Gina Gonzalez is also being seen today for CPE    HLD.  Control is good.    HTN.  Control is good.     Review of Systems   Respiratory: Negative.     Cardiovascular: Negative.        Objective   Vital Signs:  /84   Pulse 63   Ht 167.6 cm (65.98\")   Wt 87.1 kg (192 lb)   SpO2 98%   BMI 31.00 kg/m²     Physical Exam  Constitutional:       Appearance: She is well-developed.   HENT:      Head: Normocephalic and atraumatic.      Right Ear: Hearing, tympanic membrane and external ear normal.      Left Ear: Hearing, tympanic membrane and external ear normal.      Nose: Nose normal.   Neck:      Thyroid: No thyromegaly.   Cardiovascular:      Rate and Rhythm: Normal rate and regular rhythm.      Heart sounds: Normal heart sounds. No murmur heard.  Pulmonary:      Effort: Pulmonary effort is normal.      Breath sounds: Normal breath sounds.   Abdominal:      General: There is no " distension.      Palpations: Abdomen is soft.      Tenderness: There is no abdominal tenderness.   Musculoskeletal:      Cervical back: Neck supple.   Lymphadenopathy:      Cervical: No cervical adenopathy.   Skin:     General: Skin is warm and dry.   Neurological:      Mental Status: She is alert and oriented to person, place, and time.   Psychiatric:         Speech: Speech normal.         Behavior: Behavior normal.         Thought Content: Thought content normal.         Judgment: Judgment normal.          The following data was reviewed by: Lisa Malone MD on 05/29/2024:  Common labs          1/20/2024    15:09 2/19/2024    15:49 5/22/2024    14:18   Common Labs   Glucose 105  90  99    BUN 17  23  23    Creatinine 0.84  0.84  0.81    Sodium 140  140  141    Potassium 4.0  4.2  4.6    Chloride 104  104  105    Calcium 9.8  9.4  9.8    Total Protein   7.3    Albumin  4.3  4.6    Total Bilirubin  0.8  0.7    Alkaline Phosphatase  87  97    AST (SGOT)  22  22    ALT (SGPT)  21  21    WBC 11.08  5.27  4.60    Hemoglobin 14.6  14.3  15.4    Hematocrit 44.2  42.6  46.8    Platelets 255  258  272    Total Cholesterol   207    Triglycerides   67    HDL Cholesterol   105    LDL Cholesterol    90                 Assessment and Plan   Diagnoses and all orders for this visit:    1. Medicare annual wellness visit, subsequent (Primary)    2. Well adult exam    3. Menopause  -     DEXA Bone Density Axial; Future    4. Cerebral meningioma  -     MRI Brain With & Without Contrast; Future    Discussed importance of preventative care including vaccinations, age appropriate cancer screening, routine lab work, healthy diet, and active lifestyle.           Follow Up   Return in about 1 year (around 5/29/2025).  Patient was given instructions and counseling regarding her condition or for health maintenance advice. Please see specific information pulled into the AVS if appropriate.

## 2024-05-31 ENCOUNTER — TREATMENT (OUTPATIENT)
Dept: PHYSICAL THERAPY | Facility: CLINIC | Age: 74
End: 2024-05-31
Payer: MEDICARE

## 2024-05-31 DIAGNOSIS — Z96.612 STATUS POST REVERSE TOTAL SHOULDER REPLACEMENT, LEFT: Primary | ICD-10-CM

## 2024-05-31 DIAGNOSIS — S42.135D CLOSED NONDISPLACED FRACTURE OF CORACOID PROCESS OF LEFT SHOULDER WITH ROUTINE HEALING, SUBSEQUENT ENCOUNTER: ICD-10-CM

## 2024-05-31 NOTE — PROGRESS NOTES
Physical Therapy Re-certification     [facility] Physical Therapy [facility]  [address] [place] [address]  [phone] (phone)  [phone] (fax)      Patient: Gina Gonzalez   : 1950  Diagnosis/ICD-10 Code:  Status post reverse total shoulder replacement, left [Z96.612]  Referring practitioner: No ref. provider found  Date of Initial Visit: Type: THERAPY  Noted: 3/6/2024  Today's Date: 2024  Patient seen for 7 sessions    Visit Diagnoses:    ICD-10-CM ICD-9-CM   1. Status post reverse total shoulder replacement, left  Z96.612 V43.61   2. Closed nondisplaced fracture of coracoid process of left shoulder with routine healing, subsequent encounter  S42.135D V54.11         Subjective:     Clinical Progress: improved  Home Program Compliance: Yes  Treatment has included:  therapeutic exercise, manual therapy, therapeutic activity, neuro-muscular retraining , and patient education with home exercise program     Subjective   pain   Trying to go back to work the 3  week of  or by the end of   Not sleeping well at night   wake up a lot and not able to lay on that shoulder     Objective   PROM ER at 30 = 82   ER at 90= 9   Scaption =160    MMT  4+/5 bilaterally in available ROM     AROM    Scaption   LEFT  133   RIGHT  155   Functional IR L1 with assistnace      THEREX/  NMREd   SIDE LYING sleeper  stretches  STICK behind the back   WASH the back   PAT the back     Verbal cueing for proper technique   Work at mirror for scapulo humeral rhythm     Functional Outcome Score: SPADI  13/130= 10% perceived disability       Assessment/Plan    Gina Gonzalez is a 74 y.o. female who has attended 7 therapy sessions since her evaluation on 3/6/2024 for treatment following (L) shoulder reverse TSA on 2024. Following those 3 sessions, pt fractured her coracoid process in mid March when pulling up her pants. Her orthopedic surgeon recommended an 8 week break from PT to allow fracture to heal.  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 Pt has met 6/9 goals. Pt demonstrates improved AROM and strength. She also has improved functional ability as indicated by her score on the SPADI. She continues to demonstrate limited ROM, weakness, and impaired functional ability. Pt would benefit from continued skilled physical therapy to address these impairments.      Goals  Plan Goals: PT Goal Recert Due Date: 90 days     SHORT TERM GOALS: 6 weeks  STG 1.  Initiation of appropriate HEP for shoulder per rehab guidelines-. MET  STG 2  Pt instructed in and voices knowledge of precautions including positioning of (L) shoulder to prevent dislocation and no active motion or lifting items > a cup/plate, etc . MET  STG 3. PROM FE to 120-deg;  ER (pos) to 45 (per guidelines)  MET  STG 4: SPADI Score decreased > 10pts MET     LONG TERM GOALS: 12 weeks (or at time of DISCHARGE)   LTG 1. Pt (I) w/ appropriate HEP for mobility, strengthening, stabilization and functional activities and NMR ONGOING  LTG  2. Patient will demonstrate good postural positioning of upper quarter and LUE w/ minimal cuing . MET  LTG 3: AROM (L) Shoulder to allow all ADLs and job duties w/ modifications as needed . ONGOING  LTG 4: Strength (L) shoulder / UE to allow normal ADLs and job duties w/ modifications as needed. ONGOING  LTG 5:  SPADI score from 113  to < 30 MET     Recommendations: Continue as planned  Timeframe: 2 months  Prognosis to achieve goals: good    PT Signature: Marleny Parry PT    [place] License Number: [id number]    Electronically signed by Marleny Parry PT, 05/31/24, 11:10 AM EDT      Based upon review of the patient's progress and continued therapy plan, it is my medical opinion that Gina Gonzalez should continue physical therapy treatment at [facility]  [facility]  [facility] PHYSICAL THERAP[facility]  [address]   [place] [address]  [phone]. office phone  [phone] office fax      Re-certification  Certification Period: 2024  I certify that the therapy services are furnished while this patient is under my care.  The services outlined above are required by this patient, and will be reviewed every 90 days.     PHYSICIAN:    NPI:                                          DATE:     Signature: __________________________________        Please sign and return via fax to [phone]   Thank you, [facility].    Timed:  Manual Therapy:    0     mins  71007;  Therapeutic Exercise:    25     mins  03682;     Neuromuscular Jeremi:    15    mins  49236;    Therapeutic Activity:     0     mins  77328;     Gait Trainin     mins  60613;     Ultrasound:     0     mins  25526;    Orthotic Mgmt/training  0     mins 95723;  Orthotic check out  0     mins 39420;  Aquatic Therapy    0     mins 80579;  Self Care                       0     mins   05401      Untimed:  Electrical Stimulation:    0     mins  03208 ( );  Traction:    0     mins  56972;   Dry Needling  (1-2 muscles)            0     mins 61534 (Self-pay)  Dry Needling (3-4 muscles) 0     mins 86658 (Self-pay)  Dry Needling Trial    0     mins DRYNDLTRIAL  (No Charge)  Canalith Repositioning  0     mins 63731;    :  Timed Treatment:   40   mins   Total Treatment:     40   mins

## 2024-06-04 NOTE — PROGRESS NOTES
"Physical Therapy Daily Treatment Note    [facility] Physical Therapy [facility]  [address] [place] [address]  [phone] (phone)  [phone] (fax)    Patient: Gina Gonzalez   : 1950  Diagnosis/ICD-10 Code:  Status post reverse total shoulder replacement, left [Z96.612]  Referring practitioner: Alverto Fish MD  Today's Date: 2024  Patient seen for 8 sessions    Visit Diagnoses:    ICD-10-CM ICD-9-CM   1. Status post reverse total shoulder replacement, left  Z96.612 V43.61   2. Closed nondisplaced fracture of coracoid process of left shoulder with routine healing, subsequent encounter  S42.135D V54.11              Subjective   Pt states that she has been doing the exercises that Marleny gave her which have been very helpful.     Objective   See Exercise, Manual, and Modality Logs for complete treatment.       THEREX/THERACT/NEURO  - Attempted ER w/ band, discontinued due to pain  - Sidelying ER, no weight, x12  - Supine alphabet, 1#   - Sleeper stretch 20 sec x4  - \"V\" pulses--up and down  - Practicing shoulder scaption in mirror--no shrugging/UT compensation x10     MANUAL THERAPY  - Manual stretching into flex, scaption, ER at 45 degrees, IR degrees at 45 degrees  - STM to deltoid, biceps, RC mm      Assessment/Plan  Pt tolerated session well overall, reporting an appropriate level of muscle fatigue following strength exercises. Attempted shoulder ER w/ resistance this session but d/c due to pain and decreased available ROM. Practiced proper scapulohumeral rhythm with shoulder flexion in the mirror. Encouraged pt to practice this throughout the day. Continue PT POC.          Timed:    Manual Therapy:    10     mins  09807;  Therapeutic Exercise:    20     mins  59534;     Neuromuscular Jeremi:    10    mins  68259;    Therapeutic Activity:     0     mins  26364;     Gait Trainin     mins  82548;     Ultrasound:     0     mins  79917;    Aquatic Therapy    0     mins " 15913;  Self Care                       0     mins   49513        Untimed:  Electrical Stimulation:    0     mins  04555 ( );  Traction:    0     mins  01348;   Dry Needling  (1-2 muscles)            0     mins 20560 (Self-pay)  Dry Needling (3-4 muscles) 0     20561 (Self-pay)  Dry Needling Trial    0     DRYNDLTRIAL  (No Charge)    Timed Treatment:   40   mins   Total Treatment:     40   mins    Sima Pablo PT  Physical Therapist    [place] License:[id number]

## 2024-06-05 ENCOUNTER — TREATMENT (OUTPATIENT)
Dept: PHYSICAL THERAPY | Facility: CLINIC | Age: 74
End: 2024-06-05
Payer: MEDICARE

## 2024-06-05 DIAGNOSIS — Z96.612 STATUS POST REVERSE TOTAL SHOULDER REPLACEMENT, LEFT: Primary | ICD-10-CM

## 2024-06-05 DIAGNOSIS — S42.135D CLOSED NONDISPLACED FRACTURE OF CORACOID PROCESS OF LEFT SHOULDER WITH ROUTINE HEALING, SUBSEQUENT ENCOUNTER: ICD-10-CM

## 2024-06-06 NOTE — PROGRESS NOTES
"Physical Therapy Daily Treatment Note    [facility] Physical Therapy [facility]  [address] [place] [address]  [phone] (phone)  [phone] (fax)    Patient: Gina Gonzalez   : 1950  Diagnosis/ICD-10 Code:  Status post reverse total shoulder replacement, left [Z96.612]  Referring practitioner: Alverto Fish MD  Today's Date: 2024  Patient seen for 9 sessions    Visit Diagnoses:    ICD-10-CM ICD-9-CM   1. Status post reverse total shoulder replacement, left  Z96.612 V43.61   2. Closed nondisplaced fracture of coracoid process of left shoulder with routine healing, subsequent encounter  S42.135D V54.11              Subjective   Pt states her shoulder feels tight again today.    Objective   See Exercise, Manual, and Modality Logs for complete treatment.     THEREX/THERACT/NEURO  - ER stretch at wall 10-15 sec   - Sidelying ER, no weight, 2x12  - Supine alphabet, 1# *defer  - Sleeper stretch 20 sec x4 *defer  - \"V\" pulses--up and down, L&R x30 sec  - ER pulses x1 min  - Serratus punches 2x10  - Practicing shoulder scaption in mirror--no shrugging/UT compensation x10 *defer  - Ambulation 1 lap hold 2.5 dumbbells w/ 90 degrees flexion     MANUAL THERAPY  - Manual stretching into (defer: flex, scaption) ER at 45 degrees, IR degrees at 45 degrees  - STM to deltoid, biceps, RC mm--focus on posterior RC    Assessment/Plan  Pt tolerated session well overall, reporting an appropriate level of muscle fatigue following strength exercises. Required mod vcs for form during serratus punches (to keep elbow ext), which she was able to correct. Added ER stretch at wall so pt can stretch this motion independently. Continue PT POC.          Timed:    Manual Therapy:    12     mins  44553;  Therapeutic Exercise:    20     mins  93028;     Neuromuscular Jeremi:    8    mins  69987;    Therapeutic Activity:     0     mins  23505;     Gait Trainin     mins  11204;     Ultrasound:     0     mins  " 74276;    Aquatic Therapy    0     mins 88727;  Self Care                       0     mins   10353        Untimed:  Electrical Stimulation:    0     mins  20402 ( );  Traction:    0     mins  02933;   Dry Needling  (1-2 muscles)            0     mins 20560 (Self-pay)  Dry Needling (3-4 muscles) 0     20561 (Self-pay)  Dry Needling Trial    0     DRYNDLTRIAL  (No Charge)    Timed Treatment:   40   mins   Total Treatment:     40   mins    Sima Pablo PT  Physical Therapist    [place] License:[id number]

## 2024-06-07 ENCOUNTER — TREATMENT (OUTPATIENT)
Dept: PHYSICAL THERAPY | Facility: CLINIC | Age: 74
End: 2024-06-07
Payer: MEDICARE

## 2024-06-07 DIAGNOSIS — Z96.612 STATUS POST REVERSE TOTAL SHOULDER REPLACEMENT, LEFT: Primary | ICD-10-CM

## 2024-06-07 DIAGNOSIS — S42.135D CLOSED NONDISPLACED FRACTURE OF CORACOID PROCESS OF LEFT SHOULDER WITH ROUTINE HEALING, SUBSEQUENT ENCOUNTER: ICD-10-CM

## 2024-07-02 NOTE — PROGRESS NOTES
"Physical Therapy Daily Treatment Note    [facility] [place] [address]  [phone] (phone)  [phone] (fax)    Patient: Gina Gonzalez   : 1950  Diagnosis/ICD-10 Code:  Status post reverse total shoulder replacement, left [Z96.612]  Referring practitioner: Alverto Fish MD  Today's Date: 7/3/2024  Patient seen for 10 sessions    Visit Diagnoses:    ICD-10-CM ICD-9-CM   1. Status post reverse total shoulder replacement, left  Z96.612 V43.61   2. Closed nondisplaced fracture of coracoid process of left shoulder with routine healing, subsequent encounter  S42.135D V54.11              Subjective   Pt reports that since her last PT session, she started working again at [employer]. She states her shoulder hasn't bothered her at all. Occasionally, when she wakes up in the morning, her shoulder feels stiff. She also went to Dr. Fish who cleared her.    Objective   See Exercise, Manual, and Modality Logs for complete treatment.     THEREX/THERACT/NEURO  - Sidelying ER, 1#, 2x12  - Lateral raises, 1# 2x10  - Triceps push downs 20# x10, 25# x10, 30# x10  - Rows 3 sec hold RTB 2x10    *defer  - ER stretch at wall 10-15 sec   - Supine alphabet, 1# *defer  - Sleeper stretch 20 sec x4 *defer  - \"V\" pulses--up and down, L&R x30 sec  - ER pulses x1 min  - Serratus punches 2x10  - Practicing shoulder scaption in mirror--no shrugging/UT compensation x10 *defer  - Ambulation 1 lap hold 2.5 dumbbells w/ 90 degrees flexion     MANUAL THERAPY  - Manual stretching into flexion, abduction    Assessment/Plan  Pt tolerated session well overall, reporting an appropriate level of muscle fatigue following strength exercises. Today, pt's passive ROM was functional in all planes, so initiated progressed strengthening exercises. Added triceps push downs, rows, and shoulder abduction with resistance this session to strengthen triceps, middle trap, and deltoid mm, respectively. " Continue PT POC.          Timed:    Manual Therapy:    8     mins  56021;  Therapeutic Exercise:    18     mins  43680;     Neuromuscular Jeremi:    18    mins  74763;    Therapeutic Activity:     0     mins  38274;     Gait Trainin     mins  03714;     Ultrasound:     0     mins  89415;    Aquatic Therapy    0     mins 19062;  Self Care                       0     mins   99128        Untimed:  Electrical Stimulation:    0     mins  19156 ( );  Traction:    0     mins  84811;   Dry Needling  (1-2 muscles)            0     mins 60279 (Self-pay)  Dry Needling (3-4 muscles) 0      (Self-pay)  Dry Needling Trial    0     DRYNDLTRIAL  (No Charge)    Timed Treatment:   44   mins   Total Treatment:     44   mins    Sima Pablo PT  Physical Therapist    [place] License:[id number]

## 2024-07-03 ENCOUNTER — TREATMENT (OUTPATIENT)
Dept: PHYSICAL THERAPY | Facility: CLINIC | Age: 74
End: 2024-07-03
Payer: MEDICARE

## 2024-07-03 DIAGNOSIS — S42.135D CLOSED NONDISPLACED FRACTURE OF CORACOID PROCESS OF LEFT SHOULDER WITH ROUTINE HEALING, SUBSEQUENT ENCOUNTER: ICD-10-CM

## 2024-07-03 DIAGNOSIS — Z96.612 STATUS POST REVERSE TOTAL SHOULDER REPLACEMENT, LEFT: Primary | ICD-10-CM

## 2024-07-05 ENCOUNTER — HOSPITAL ENCOUNTER (OUTPATIENT)
Dept: MRI IMAGING | Facility: HOSPITAL | Age: 74
Discharge: HOME | End: 2024-07-05
Payer: MEDICARE

## 2024-07-05 DIAGNOSIS — D32.0 CEREBRAL MENINGIOMA: ICD-10-CM

## 2024-07-05 PROCEDURE — 0 GADOBENATE DIMEGLUMINE 529 MG/ML SOLUTION: Performed by: INTERNAL MEDICINE

## 2024-07-05 PROCEDURE — 70553 MRI BRAIN STEM W/O & W/DYE: CPT

## 2024-07-05 PROCEDURE — A9577 INJ MULTIHANCE: HCPCS | Performed by: INTERNAL MEDICINE

## 2024-07-05 RX ADMIN — GADOBENATE DIMEGLUMINE 18 ML: 529 INJECTION, SOLUTION INTRAVENOUS at 16:47

## 2024-07-07 NOTE — PROGRESS NOTES
"Physical Therapy Daily Treatment Note    [facility]  [address] [place] [address]  [phone] (phone)  [phone] (fax)    Patient: Gina Gonzalez   : 1950  Diagnosis/ICD-10 Code:  Status post reverse total shoulder replacement, left [Z96.612]  Referring practitioner: Alverto Fish MD  Today's Date: 2024  Patient seen for 11 sessions    Visit Diagnoses:    ICD-10-CM ICD-9-CM   1. Status post reverse total shoulder replacement, left  Z96.612 V43.61   2. Closed nondisplaced fracture of coracoid process of left shoulder with routine healing, subsequent encounter  S42.135D V54.11              Subjective   Pt states that she went to a pool party an played volleyball with a beach ball and felt perfectly fine.     Objective   See Exercise, Manual, and Modality Logs for complete treatment.     THEREX/THERACT/NEURO  - UB ergometer, 4 min  - Rows 3 sec hold RTB 3x10  - IR, RTB, 3x10  - ER, YTB, small ROM, 3x10  - B ER x20  - D2 flex/ext x20  - UT stretch 30 sec x2   - Triceps push downs 35# 2x10     *defer  - ER stretch at wall 10-15 sec   - Supine alphabet, 1# *defer  - Sleeper stretch 20 sec x4 *defer  - \"V\" pulses--up and down, L&R x30 sec  - ER pulses x1 min  - Serratus punches 2x10  - Practicing shoulder scaption in mirror--no shrugging/UT compensation x10 *defer  - Ambulation 1 lap hold 2.5 dumbbells w/ 90 degrees flexion  - Sidelying ER, 1#, 2x12  - Lateral raises, 1# 2x10     MANUAL THERAPY *defer  - Manual stretching into flexion, abduction    Assessment/Plan  Pt tolerated session well overall, reporting an appropriate level of muscle fatigue following strength exercises. Added D2 flex and ext to functionally strengthen shoulder and facilitate ease with activities like buckling seat belt. Pt continues to demonstrate most limitation with active abduction. Continue PT POC.          Timed:    Manual Therapy:    0     mins  47821;  Therapeutic Exercise:    " 15     mins  21162;     Neuromuscular Jeremi:    12    mins  86675;    Therapeutic Activity:     15     mins  59812;     Gait Trainin     mins  25789;     Ultrasound:     0     mins  91969;    Aquatic Therapy    0     mins 28036;  Self Care                       0     mins   66037        Untimed:  Electrical Stimulation:    0     mins  41640 ( );  Traction:    0     mins  69315;   Dry Needling  (1-2 muscles)            0     mins  (Self-pay)  Dry Needling (3-4 muscles) 0      (Self-pay)  Dry Needling Trial    0     DRYNDLTRIAL  (No Charge)    Timed Treatment:   42   mins   Total Treatment:     42   mins    Sima Pablo PT  Physical Therapist    [place] License:[id number]

## 2024-07-08 ENCOUNTER — TREATMENT (OUTPATIENT)
Dept: PHYSICAL THERAPY | Facility: CLINIC | Age: 74
End: 2024-07-08
Payer: MEDICARE

## 2024-07-08 DIAGNOSIS — Z96.612 STATUS POST REVERSE TOTAL SHOULDER REPLACEMENT, LEFT: Primary | ICD-10-CM

## 2024-07-08 DIAGNOSIS — S42.135D CLOSED NONDISPLACED FRACTURE OF CORACOID PROCESS OF LEFT SHOULDER WITH ROUTINE HEALING, SUBSEQUENT ENCOUNTER: ICD-10-CM
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
## 2024-07-10 ENCOUNTER — TREATMENT (OUTPATIENT)
Dept: PHYSICAL THERAPY | Facility: CLINIC | Age: 74
End: 2024-07-10
Payer: MEDICARE

## 2024-07-10 DIAGNOSIS — S42.135D CLOSED NONDISPLACED FRACTURE OF CORACOID PROCESS OF LEFT SHOULDER WITH ROUTINE HEALING, SUBSEQUENT ENCOUNTER: ICD-10-CM

## 2024-07-10 DIAGNOSIS — Z96.612 STATUS POST REVERSE TOTAL SHOULDER REPLACEMENT, LEFT: Primary | ICD-10-CM
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
## 2024-07-15 ENCOUNTER — TREATMENT (OUTPATIENT)
Dept: PHYSICAL THERAPY | Facility: CLINIC | Age: 74
End: 2024-07-15
Payer: MEDICARE

## 2024-07-15 DIAGNOSIS — S42.135D CLOSED NONDISPLACED FRACTURE OF CORACOID PROCESS OF LEFT SHOULDER WITH ROUTINE HEALING, SUBSEQUENT ENCOUNTER: ICD-10-CM

## 2024-07-15 DIAGNOSIS — Z96.612 STATUS POST REVERSE TOTAL SHOULDER REPLACEMENT, LEFT: Primary | ICD-10-CM

## 2024-07-16 NOTE — PROGRESS NOTES
"Physical Therapy Daily Treatment Note    [facility]  [address] [place] [address]  [phone] (phone)  [phone] (fax)    Patient: Gina Gonzalez   : 1950  Diagnosis/ICD-10 Code:  Status post reverse total shoulder replacement, left [Z96.612]  Referring practitioner: Alverto Fish MD  Today's Date: 2024  Patient seen for 14 sessions    Visit Diagnoses:    ICD-10-CM ICD-9-CM   1. Status post reverse total shoulder replacement, left  Z96.612 V43.61   2. Closed nondisplaced fracture of coracoid process of left shoulder with routine healing, subsequent encounter  S42.135D V54.11              Subjective   Pt states that her shoulder feels fine.    Objective   See Exercise, Manual, and Modality Logs for complete treatment.     THEREX/THERACT/NEURO  - UB ergometer, 2 min retro, 3 min forward  - Rows 3 sec hold GTB x15  - IR, RTB, 2x15  - B ER 2x15  - Sidelying ER, 1#, 2x15  - Shoulder abduction, looking in mirror to ensure no upper trap compensation, x5  - Lateral raises to 90 degrees, 1#, slow ecc phase, 2x10  - Straight raises to 90 degrees, 1#, slow ecc phase, x10       *defer  - ER stretch at wall 10-15 sec   - ER, YTB, small ROM, 3x10  - Sleeper stretch 20 sec x4 *defer  - \"V\" pulses--up and down, L&R x30 sec  - ER pulses x1 min  - Serratus punches 2x10  - Practicing shoulder scaption in mirror--no shrugging/UT compensation x10 *defer  - Ambulation 1 lap hold 2.5 dumbbells w/ 90 degrees flexion  - D2 flex/ext x10  - UT stretch 30 sec x2   - Triceps push downs 35# 2x10  - Shoulder flexion, looking in mirror to ensure no upper trap compensation, x5  - Standing alphabet, at 90 degrees flex, 1#      Assessment/Plan  Pt tolerated session well overall, reporting an appropriate level of muscle fatigue following strength exercises. Required min vcs for form during IR (to not rotate whole body and just rotate from shoulder), which she was able to correct. Added " lateral and straight raises to strengthen deltoid. Continue PT POC.          Timed:    Manual Therapy:    0     mins  03349;  Therapeutic Exercise:    20     mins  28232;     Neuromuscular Jeremi:    10    mins  77542;    Therapeutic Activity:     10     mins  80909;     Gait Trainin     mins  45884;     Ultrasound:     0     mins  69933;    Aquatic Therapy    0     mins 31938;  Self Care                       0     mins   60224        Untimed:  Electrical Stimulation:    0     mins  00268 ( );  Traction:    0     mins  79519;   Dry Needling  (1-2 muscles)            0     mins  (Self-pay)  Dry Needling (3-4 muscles) 0      (Self-pay)  Dry Needling Trial    0     DRYNDLTRIAL  (No Charge)    Timed Treatment:   40   mins   Total Treatment:     40   mins    Sima Pablo PT  Physical Therapist    [place] License:[id number]

## 2024-07-17 ENCOUNTER — HOSPITAL ENCOUNTER (OUTPATIENT)
Dept: MAMMOGRAPHY | Facility: HOSPITAL | Age: 74
Discharge: HOME | End: 2024-07-17
Admitting: INTERNAL MEDICINE
Payer: MEDICARE

## 2024-07-17 ENCOUNTER — TREATMENT (OUTPATIENT)
Dept: PHYSICAL THERAPY | Facility: CLINIC | Age: 74
End: 2024-07-17
Payer: MEDICARE

## 2024-07-17 DIAGNOSIS — Z12.31 VISIT FOR SCREENING MAMMOGRAM: ICD-10-CM

## 2024-07-17 DIAGNOSIS — S42.135D CLOSED NONDISPLACED FRACTURE OF CORACOID PROCESS OF LEFT SHOULDER WITH ROUTINE HEALING, SUBSEQUENT ENCOUNTER: ICD-10-CM

## 2024-07-17 DIAGNOSIS — Z96.612 STATUS POST REVERSE TOTAL SHOULDER REPLACEMENT, LEFT: Primary | ICD-10-CM

## 2024-07-17 PROCEDURE — 77067 SCR MAMMO BI INCL CAD: CPT

## 2024-07-17 PROCEDURE — 77063 BREAST TOMOSYNTHESIS BI: CPT

## 2024-07-20 NOTE — PROGRESS NOTES
"Physical Therapy Daily Treatment Note    [facility]  [address] [place] [address]  [phone] (phone)  [phone] (fax)    Patient: Gina Gonzalez   : 1950  Diagnosis/ICD-10 Code:  Status post reverse total shoulder replacement, left [Z96.612]  Referring practitioner: Alverto Fish MD  Today's Date: 2024  Patient seen for 15 sessions    Visit Diagnoses:    ICD-10-CM ICD-9-CM   1. Status post reverse total shoulder replacement, left  Z96.612 V43.61   2. Closed nondisplaced fracture of coracoid process of left shoulder with routine healing, subsequent encounter  S42.135D V54.11              Subjective   Pt states that she did a lot over the weekend. She trimmed bushes and trees and went to the pool to do UB exercises. She stated her shoulder felt good.     Objective   See Exercise, Manual, and Modality Logs for complete treatment.     THEREX/THERACT/NEURO  - UB ergometer, 2.5 min retro, 2.5 min forward  - Triceps push downs 30# x10, 40# 2x10  - Row Machine M 20# 3x10  - M Biceps Machine 20# x10, x8  - Sidelying ER, 1#, 2x15   - Serratus punches 2# 3x10  - Supine alphabet, at 90 degrees flex, 2#     *defer  - ER stretch at wall 10-15 sec   - ER, YTB, small ROM, 3x10  - Sleeper stretch 20 sec x4 *defer  - \"V\" pulses--up and down, L&R x30 sec  - ER pulses x1 min  - Practicing shoulder scaption in mirror--no shrugging/UT compensation x10 *defer  - Ambulation 1 lap hold 2.5 dumbbells w/ 90 degrees flexion  - D2 flex/ext x10  - UT stretch 30 sec x2   - Shoulder flexion, looking in mirror to ensure no upper trap compensation, x5  - Rows 3 sec hold GTB x15  - IR, RTB, 2x15  - B ER 2x15  - Shoulder abduction, looking in mirror to ensure no upper trap compensation, x5  - Lateral raises to 90 degrees, 1#, slow ecc phase, 2x10  - Straight raises to 90 degrees, 1#, slow ecc phase, x10    Assessment/Plan  Pt tolerated session well overall, reporting an appropriate " level of muscle fatigue following strength exercises. Added resistance machines this session including bicep curls and rows to further strength UE musculature. Required mod vcs for form during serratus punches (keep elbow straight), which she was able to correct. Continue PT POC.          Timed:    Manual Therapy:    0     mins  30984;  Therapeutic Exercise:    25     mins  70735;     Neuromuscular Jeremi:    10    mins  56308;    Therapeutic Activity:     8     mins  70557;     Gait Training:           mins  20938;     Ultrasound:     0     mins  40732;    Aquatic Therapy    0     mins 29708;  Self Care                       0     mins   76290        Untimed:  Electrical Stimulation:    0     mins  14198 ( );  Traction:    0     mins  89487;   Dry Needling  (1-2 muscles)            0     mins 44933 (Self-pay)  Dry Needling (3-4 muscles) 0     20561 (Self-pay)  Dry Needling Trial    0     DRYNDLTRIAL  (No Charge)    Timed Treatment:   43   mins   Total Treatment:     43   mins    Sima Pablo PT  Physical Therapist    [place] License:[id number]

## 2024-07-22 ENCOUNTER — TREATMENT (OUTPATIENT)
Dept: PHYSICAL THERAPY | Facility: CLINIC | Age: 74
End: 2024-07-22
Payer: MEDICARE

## 2024-07-22 DIAGNOSIS — Z96.612 STATUS POST REVERSE TOTAL SHOULDER REPLACEMENT, LEFT: Primary | ICD-10-CM

## 2024-07-22 DIAGNOSIS — S42.135D CLOSED NONDISPLACED FRACTURE OF CORACOID PROCESS OF LEFT SHOULDER WITH ROUTINE HEALING, SUBSEQUENT ENCOUNTER: ICD-10-CM

## 2024-07-22 PROCEDURE — 97530 THERAPEUTIC ACTIVITIES: CPT

## 2024-07-22 PROCEDURE — 97110 THERAPEUTIC EXERCISES: CPT

## 2024-07-22 PROCEDURE — 97112 NEUROMUSCULAR REEDUCATION: CPT

## 2024-07-23 NOTE — PROGRESS NOTES
"Physical Therapy Daily Treatment Note    [facility]  [address] [place] [address]  [phone] (phone)  [phone] (fax)    Patient: Gina Gonzalez   : 1950  Diagnosis/ICD-10 Code:  Status post reverse total shoulder replacement, left [Z96.612]  Referring practitioner: Alverto Fish MD  Today's Date: 2024  Patient seen for 16 sessions    Visit Diagnoses:    ICD-10-CM ICD-9-CM   1. Status post reverse total shoulder replacement, left  Z96.612 V43.61   2. Closed nondisplaced fracture of coracoid process of left shoulder with routine healing, subsequent encounter  S42.135D V54.11              Subjective   Pt states that her shoulder felt great after last session.    Objective   See Exercise, Manual, and Modality Logs for complete treatment.     THEREX/THERACT/NEURO  - UB ergometer, 2.5 min retro, 2.5 min forward  - Row Machine M 30# 3x10  - M Biceps Machine 15# 3x10  - Lateral raises to 90 degrees, 1#, slow ecc phase, 2x10  - Straight raises to 90 degrees, 1#, slow ecc phase, x10  - Serratus punches 2# 3x10  - Supine alphabet, at 90 degrees flex, 2#  - UT stretch 30 sec x2        *defer  - ER stretch at wall 10-15 sec   - ER, YTB, small ROM, 3x10  - Sleeper stretch 20 sec x4 *defer  - \"V\" pulses--up and down, L&R x30 sec  - ER pulses x1 min  - Practicing shoulder scaption in mirror--no shrugging/UT compensation x10 *defer  - Ambulation 1 lap hold 2.5 dumbbells w/ 90 degrees flexion  - D2 flex/ext x10  - Shoulder flexion, looking in mirror to ensure no upper trap compensation, x5  - Rows 3 sec hold GTB x15  - IR, RTB, 2x15  - B ER 2x15  - Shoulder abduction, looking in mirror to ensure no upper trap compensation, x5  - Triceps push downs 30# x10, 40# 2x10  - Sidelying ER, 1#, 2x15     Assessment/Plan  Pt tolerated session well overall, reporting an appropriate level of muscle fatigue following strength exercises. Progressed rows by adding more weight this " session. Cued pt to retract shoulder blades. Plan to d/c next session w/ ind HEP.  Continue PT POC.          Timed:    Manual Therapy:    0     mins  92640;  Therapeutic Exercise:    25     mins  21887;     Neuromuscular Jeremi:    0    mins  83802;    Therapeutic Activity:     15     mins  86173;     Gait Trainin     mins  39731;     Ultrasound:     0     mins  85999;    Aquatic Therapy    0     mins 96445;  Self Care                       0     mins   36615        Untimed:  Electrical Stimulation:    0     mins  79279 ( );  Traction:    0     mins  19127;   Dry Needling  (1-2 muscles)            0     mins 61930 (Self-pay)  Dry Needling (3-4 muscles) 0     59754 (Self-pay)  Dry Needling Trial    0     DRYNDLTRIAL  (No Charge)    Timed Treatment:   40   mins   Total Treatment:     40   mins    Sima Pablo PT  Physical Therapist    [place] License:[id number]

## 2024-07-24 ENCOUNTER — TREATMENT (OUTPATIENT)
Dept: PHYSICAL THERAPY | Facility: CLINIC | Age: 74
End: 2024-07-24
Payer: MEDICARE

## 2024-07-24 DIAGNOSIS — Z96.612 STATUS POST REVERSE TOTAL SHOULDER REPLACEMENT, LEFT: Primary | ICD-10-CM

## 2024-07-24 DIAGNOSIS — S42.135D CLOSED NONDISPLACED FRACTURE OF CORACOID PROCESS OF LEFT SHOULDER WITH ROUTINE HEALING, SUBSEQUENT ENCOUNTER: ICD-10-CM

## 2024-07-30 NOTE — PROGRESS NOTES
Physical Therapy Daily Treatment Note/Discharge Summary    [facility]  [address] [place] [address]  [phone] (phone)  [phone] (fax)    Patient: Gina Gonzalez   : 1950  Diagnosis/ICD-10 Code:  Status post reverse total shoulder replacement, left [Z96.612]  Referring practitioner: Alverto Fish MD  Today's Date: 2024  Patient seen for 17 sessions    Visit Diagnoses:    ICD-10-CM ICD-9-CM   1. Status post reverse total shoulder replacement, left  Z96.612 V43.61   2. Closed nondisplaced fracture of coracoid process of left shoulder with routine healing, subsequent encounter  S42.135D V54.11              Subjective   Pt states that her shoulder is feeling fine.    Objective   See Exercise, Manual, and Modality Logs for complete treatment.      Strength/Myotome Testing      Left Shoulder   Flexion: 5-  Abduction: 5-  External rotation at 0°: 4-   Internal rotation at 0°: 4     Right Shoulder   Flexion: 4+   Abduction: 5-  External rotation at 0°: 4+   Internal rotation at 0°: 4+     See Exercise, Manual, and Modality Logs for complete treatment.            SPADI: 3130=2% (Initial eval: 113)--stayed the same this date     THEREX/THERACT/NEURO  - Row Machine M 30# 2x10  - M Biceps Machine 15# 2x10  - Triceps push downs 30# 2x10    Assessment/Plan  Today is pt's last scheduled treatment session. Pt was given printout of HEP and feels confident with independently performing exercises. Pt is agreeable to be discharged from therapy at this time.      Goals  Plan Goals: PT Goal Recert Due Date: 90 days     SHORT TERM GOALS: 6 weeks  STG 1.  Initiation of appropriate HEP for shoulder per rehab guidelines-. MET  STG 2  Pt instructed in and voices knowledge of precautions including positioning of (L) shoulder to prevent dislocation and no active motion or lifting items > a cup/plate, etc . MET  STG 3. PROM FE to 120-deg;  ER (pos) to 45 (per guidelines)   MET  STG 4: SPADI Score decreased > 10pts MET     LONG TERM GOALS: 12 weeks (or at time of DISCHARGE)   LTG 1. Pt (I) w/ appropriate HEP for mobility, strengthening, stabilization and functional activities and NMR MET  LTG  2. Patient will demonstrate good postural positioning of upper quarter and LUE w/ minimal cuing . MET  LTG 3: AROM (L) Shoulder to allow all ADLs and job duties w/ modifications as needed . MET  LTG 4: Strength (L) shoulder / UE to allow normal ADLs and job duties w/ modifications as needed. MET  LTG 5:  SPADI score from 113  to < 30 MET        Timed:    Manual Therapy:    0     mins  15004;  Therapeutic Exercise:    15     mins  09267;     Neuromuscular Jeremi:    0    mins  28849;    Therapeutic Activity:     25     mins  09589;     Gait Trainin     mins  36434;     Ultrasound:     0     mins  52453;    Aquatic Therapy    0     mins 68797;  Self Care                       0     mins   38968        Untimed:  Electrical Stimulation:    0     mins  57319 ( );  Traction:    0     mins  36975;   Dry Needling  (1-2 muscles)            0     mins  (Self-pay)  Dry Needling (3-4 muscles) 0      (Self-pay)  Dry Needling Trial    0     DRYNDLTRIAL  (No Charge)    Timed Treatment:   40   mins   Total Treatment:     40   mins    Sima Pablo PT  Physical Therapist    [place] License:[id number]

## 2024-07-31 ENCOUNTER — TREATMENT (OUTPATIENT)
Dept: PHYSICAL THERAPY | Facility: CLINIC | Age: 74
End: 2024-07-31
Payer: MEDICARE

## 2024-07-31 DIAGNOSIS — Z96.612 STATUS POST REVERSE TOTAL SHOULDER REPLACEMENT, LEFT: Primary | ICD-10-CM

## 2024-07-31 DIAGNOSIS — S42.135D CLOSED NONDISPLACED FRACTURE OF CORACOID PROCESS OF LEFT SHOULDER WITH ROUTINE HEALING, SUBSEQUENT ENCOUNTER: ICD-10-CM

## 2024-08-09 RX ORDER — LISINOPRIL 10 MG/1
10 TABLET ORAL DAILY
Qty: 90 TABLET | Refills: 1 | Status: SHIPPED | OUTPATIENT
Start: 2024-08-09

## 2024-09-07 ENCOUNTER — HOSPITAL ENCOUNTER (OUTPATIENT)
Dept: BONE DENSITY | Facility: HOSPITAL | Age: 74
Discharge: HOME | End: 2024-09-07
Payer: MEDICARE

## 2024-09-07 DIAGNOSIS — Z78.0 MENOPAUSE: ICD-10-CM

## 2024-09-07 PROCEDURE — 77080 DXA BONE DENSITY AXIAL: CPT

## 2024-11-11 RX ORDER — DULOXETIN HYDROCHLORIDE 30 MG/1
30 CAPSULE, DELAYED RELEASE ORAL DAILY
Qty: 90 CAPSULE | Refills: 1 | Status: SHIPPED | OUTPATIENT
Start: 2024-11-11

## 2024-11-26 DIAGNOSIS — E78.49 OTHER HYPERLIPIDEMIA: Primary | ICD-10-CM

## 2024-11-26 DIAGNOSIS — I10 ESSENTIAL HYPERTENSION: ICD-10-CM

## 2024-11-27 LAB
ALBUMIN SERPL-MCNC: 4.1 G/DL (ref 3.5–5.2)
ALBUMIN/GLOB SERPL: 1.9 G/DL
ALP SERPL-CCNC: 81 U/L (ref 39–117)
ALT SERPL-CCNC: 14 U/L (ref 1–33)
AST SERPL-CCNC: 20 U/L (ref 1–32)
BASOPHILS # BLD AUTO: 0.03 10*3/MM3 (ref 0–0.2)
BASOPHILS NFR BLD AUTO: 0.6 % (ref 0–1.5)
BILIRUB SERPL-MCNC: 0.5 MG/DL (ref 0–1.2)
BUN SERPL-MCNC: 25 MG/DL (ref 8–23)
BUN/CREAT SERPL: 32.5 (ref 7–25)
CALCIUM SERPL-MCNC: 8.9 MG/DL (ref 8.6–10.5)
CHLORIDE SERPL-SCNC: 106 MMOL/L (ref 98–107)
CHOLEST SERPL-MCNC: 187 MG/DL (ref 0–200)
CO2 SERPL-SCNC: 26.1 MMOL/L (ref 22–29)
CREAT SERPL-MCNC: 0.77 MG/DL (ref 0.57–1)
EGFRCR SERPLBLD CKD-EPI 2021: 81.6 ML/MIN/1.73
EOSINOPHIL # BLD AUTO: 0.2 10*3/MM3 (ref 0–0.4)
EOSINOPHIL NFR BLD AUTO: 4.1 % (ref 0.3–6.2)
ERYTHROCYTE [DISTWIDTH] IN BLOOD BY AUTOMATED COUNT: 12.6 % (ref 12.3–15.4)
GLOBULIN SER CALC-MCNC: 2.2 GM/DL
GLUCOSE SERPL-MCNC: 87 MG/DL (ref 65–99)
HCT VFR BLD AUTO: 42.4 % (ref 34–46.6)
HDLC SERPL-MCNC: 82 MG/DL (ref 40–60)
HGB BLD-MCNC: 14.2 G/DL (ref 12–15.9)
IMM GRANULOCYTES # BLD AUTO: 0.01 10*3/MM3 (ref 0–0.05)
IMM GRANULOCYTES NFR BLD AUTO: 0.2 % (ref 0–0.5)
LDLC SERPL CALC-MCNC: 94 MG/DL (ref 0–100)
LYMPHOCYTES # BLD AUTO: 1.66 10*3/MM3 (ref 0.7–3.1)
LYMPHOCYTES NFR BLD AUTO: 34.4 % (ref 19.6–45.3)
MCH RBC QN AUTO: 31.1 PG (ref 26.6–33)
MCHC RBC AUTO-ENTMCNC: 33.5 G/DL (ref 31.5–35.7)
MCV RBC AUTO: 93 FL (ref 79–97)
MONOCYTES # BLD AUTO: 0.52 10*3/MM3 (ref 0.1–0.9)
MONOCYTES NFR BLD AUTO: 10.8 % (ref 5–12)
NEUTROPHILS # BLD AUTO: 2.4 10*3/MM3 (ref 1.7–7)
NEUTROPHILS NFR BLD AUTO: 49.9 % (ref 42.7–76)
NRBC BLD AUTO-RTO: 0 /100 WBC (ref 0–0.2)
PLATELET # BLD AUTO: 258 10*3/MM3 (ref 140–450)
POTASSIUM SERPL-SCNC: 4.2 MMOL/L (ref 3.5–5.2)
PROT SERPL-MCNC: 6.3 G/DL (ref 6–8.5)
RBC # BLD AUTO: 4.56 10*6/MM3 (ref 3.77–5.28)
SODIUM SERPL-SCNC: 142 MMOL/L (ref 136–145)
TRIGL SERPL-MCNC: 58 MG/DL (ref 0–150)
VLDLC SERPL CALC-MCNC: 11 MG/DL (ref 5–40)
WBC # BLD AUTO: 4.82 10*3/MM3 (ref 3.4–10.8)

## 2024-12-10 ENCOUNTER — OFFICE VISIT (OUTPATIENT)
Dept: INTERNAL MEDICINE | Facility: CLINIC | Age: 74
End: 2024-12-10
Payer: MEDICARE

## 2024-12-10 VITALS
SYSTOLIC BLOOD PRESSURE: 138 MMHG | HEART RATE: 72 BPM | WEIGHT: 182 LBS | BODY MASS INDEX: 29.39 KG/M2 | DIASTOLIC BLOOD PRESSURE: 80 MMHG

## 2024-12-10 DIAGNOSIS — I10 ESSENTIAL HYPERTENSION: Primary | ICD-10-CM

## 2024-12-10 DIAGNOSIS — E78.49 OTHER HYPERLIPIDEMIA: ICD-10-CM

## 2024-12-10 PROCEDURE — 3079F DIAST BP 80-89 MM HG: CPT | Performed by: INTERNAL MEDICINE

## 2024-12-10 PROCEDURE — 3075F SYST BP GE 130 - 139MM HG: CPT | Performed by: INTERNAL MEDICINE

## 2024-12-10 PROCEDURE — G2211 COMPLEX E/M VISIT ADD ON: HCPCS | Performed by: INTERNAL MEDICINE

## 2024-12-10 PROCEDURE — 1126F AMNT PAIN NOTED NONE PRSNT: CPT | Performed by: INTERNAL MEDICINE

## 2024-12-10 PROCEDURE — 1159F MED LIST DOCD IN RCRD: CPT | Performed by: INTERNAL MEDICINE

## 2024-12-10 PROCEDURE — 1160F RVW MEDS BY RX/DR IN RCRD: CPT | Performed by: INTERNAL MEDICINE

## 2024-12-10 PROCEDURE — 99214 OFFICE O/P EST MOD 30 MIN: CPT | Performed by: INTERNAL MEDICINE

## 2024-12-10 NOTE — PROGRESS NOTES
Subjective     Gina Gonzalez is a 74 y.o. female who presents with   Chief Complaint   Patient presents with    Hypertension    Hyperlipidemia       Hypertension    Hyperlipidemia         The following data was reviewed by: Lisa Malone MD on 12/10/2024:  Common labs          2/19/2024    15:49 5/22/2024    14:18 11/27/2024    09:19   Common Labs   Glucose 90  99  87    BUN 23  23  25    Creatinine 0.84  0.81  0.77    Sodium 140  141  142    Potassium 4.2  4.6  4.2    Chloride 104  105  106    Calcium 9.4  9.8  8.9    Total Protein  7.3  6.3    Albumin 4.3  4.6  4.1    Total Bilirubin 0.8  0.7  0.5    Alkaline Phosphatase 87  97  81    AST (SGOT) 22  22  20    ALT (SGPT) 21  21  14    WBC 5.27  4.60  4.82    Hemoglobin 14.3  15.4  14.2    Hematocrit 42.6  46.8  42.4    Platelets 258  272  258    Total Cholesterol  207  187    Triglycerides  67  58    HDL Cholesterol  105  82    LDL Cholesterol   90  94      HTN.  Blood pressure is under good control.  HLD.  LDL cholesterol is under good control.        Review of Systems   Respiratory: Negative.     Cardiovascular: Negative.        The following portions of the patient's history were reviewed and updated as appropriate: allergies, current medications and problem list.    Patient Active Problem List    Diagnosis Date Noted    Shoulder arthritis 02/21/2024    Cerebral meningioma 03/17/2021    Anxiety 12/02/2020    Essential hypertension 02/19/2020    Other hyperlipidemia 02/22/2019    Primary osteoarthritis of both hips 02/11/2019       Current Outpatient Medications on File Prior to Visit   Medication Sig Dispense Refill    atorvastatin (LIPITOR) 20 MG tablet TAKE ONE TABLET BY MOUTH DAILY 90 tablet 3    Cholecalciferol 25 MCG (1000 UT) tablet Take 1 tablet by mouth Daily.      dicyclomine (BENTYL) 20 MG tablet Take 1 tablet by mouth Every 8 (Eight) Hours As Needed (abdominal pain). 20 tablet 0    DULoxetine (CYMBALTA) 30 MG capsule TAKE 1 CAPSULE BY MOUTH DAILY 90  capsule 1    lisinopril (PRINIVIL,ZESTRIL) 10 MG tablet TAKE 1 TABLET BY MOUTH DAILY 90 tablet 1    vitamin B-12 (VITAMIN B-12) 1000 MCG tablet Take 1 tablet by mouth Daily.       No current facility-administered medications on file prior to visit.       Objective     /80   Pulse 72   Wt 82.6 kg (182 lb)   BMI 29.39 kg/m²     Physical Exam  Constitutional:       Appearance: She is well-developed.   HENT:      Head: Normocephalic and atraumatic.   Cardiovascular:      Rate and Rhythm: Normal rate and regular rhythm.      Heart sounds: Normal heart sounds.   Pulmonary:      Effort: Pulmonary effort is normal.      Breath sounds: Normal breath sounds.   Skin:     General: Skin is warm and dry.   Neurological:      Mental Status: She is alert and oriented to person, place, and time.   Psychiatric:         Behavior: Behavior normal.         Assessment & Plan   Diagnoses and all orders for this visit:    1. Essential hypertension (Primary)    2. Other hyperlipidemia        Discussion    HTN.  Control is good.  The patient is advised to continue current dosage of lisinopril.    HLD.  Control is good.  The patient is advised to continue current dosage of atorvastatin.           Future Appointments   Date Time Provider Department Center   6/4/2025  8:20 AM [facility]   6/11/2025  3:30 PM Lisa Malone MD [facility]

## 2025-02-03 RX ORDER — LISINOPRIL 10 MG/1
10 TABLET ORAL DAILY
Qty: 90 TABLET | Refills: 1 | Status: SHIPPED | OUTPATIENT
Start: 2025-02-03

## 2025-03-04 ENCOUNTER — HOSPITAL ENCOUNTER (EMERGENCY)
Facility: HOSPITAL | Age: 75
Discharge: HOME | End: 2025-03-04
Attending: STUDENT IN AN ORGANIZED HEALTH CARE EDUCATION/TRAINING PROGRAM | Admitting: STUDENT IN AN ORGANIZED HEALTH CARE EDUCATION/TRAINING PROGRAM
Payer: OTHER MISCELLANEOUS

## 2025-03-04 VITALS
HEIGHT: 66 IN | OXYGEN SATURATION: 94 % | TEMPERATURE: 98.3 F | SYSTOLIC BLOOD PRESSURE: 153 MMHG | WEIGHT: 180 LBS | RESPIRATION RATE: 18 BRPM | DIASTOLIC BLOOD PRESSURE: 88 MMHG | HEART RATE: 85 BPM | BODY MASS INDEX: 28.93 KG/M2

## 2025-03-04 DIAGNOSIS — S61.512A LACERATION OF LEFT WRIST, INITIAL ENCOUNTER: Primary | ICD-10-CM

## 2025-03-04 PROCEDURE — 99282 EMERGENCY DEPT VISIT SF MDM: CPT

## 2025-03-04 NOTE — ED PROVIDER NOTES
EMERGENCY DEPARTMENT ENCOUNTER      Room Number:  43/43  PCP: Lisa Malone MD  Independent Historians: Patient  Patient Care Team:  Lisa Malone MD as PCP - General (Internal Medicine)       HPI:  Chief Complaint: Left wrist LAC    A complete HPI/ROS/PMH/PSH/SH/FH are unobtainable due to: None    Chronic or social conditions impacting patient care (Social Determinants of Health): None      Context: Gina Gonzalez is a 75 y.o. female with a PMH significant for HLD, anxiety, GERD, HTN who presents to the ED c/o acute superficial laceration of the left forearm. The patient states that she was using a  when she accidentally cut her left forearm. The patient is in no distress, but voices concern with her LAC being close to an underlying vein. The patient denies any syncopal episodes, any dizziness or weakness.  No blood thinner use, up-to-date on tetanus vaccination.      Upon review of prior external notes (non-ED) -and- Review of prior external test results outside of this encounter it appears the patient was evaluated in the office with Internal Medicine for hypertension on December 10, 2024.  The patient had a normal CBC on 2/19/2024 and a normal TSH on 5/22/2024.      PAST MEDICAL HISTORY  Active Ambulatory Problems     Diagnosis Date Noted    Primary osteoarthritis of both hips 02/11/2019    Other hyperlipidemia 02/22/2019    Essential hypertension 02/19/2020    Anxiety 12/02/2020    Cerebral meningioma 03/17/2021    Shoulder arthritis 02/21/2024     Resolved Ambulatory Problems     Diagnosis Date Noted    Abnormal TSH 02/12/2019    Colon cancer screening 03/20/2019    TGA (transient global amnesia) 10/13/2020    Meningioma 10/20/2020    TGA (transient global amnesia) 10/13/2020     Past Medical History:   Diagnosis Date    Anxiety and depression     Arthritis     GERD (gastroesophageal reflux disease)     Hyperlipidemia     Hypertension     Left shoulder pain     PONV (postoperative nausea and  vomiting)     Right shoulder pain     Skipped beats          PAST SURGICAL HISTORY  Past Surgical History:   Procedure Laterality Date    ABDOMINOPLASTY      BLEPHAROPLASTY Bilateral     BREAST BIOPSY Left     BENIGN    COLONOSCOPY N/A 2019    Procedure: COLONOSCOPY TO CECUM AND TI;  Surgeon: Cecilia Aviles MD;  [place] [facility] [place];  Service: Gastroenterology    MENISCECTOMY Right     TOTAL HIP ARTHROPLASTY Bilateral     TOTAL SHOULDER ARTHROPLASTY W/ DISTAL CLAVICLE EXCISION Left 2024    Procedure: TOTAL SHOULDER REVERSE ARTHROPLASTY;  Surgeon: Alverto Fish MD;  [place] [facility] [place] [facility] [place]  Service: Orthopedics;  Laterality: Left;         FAMILY HISTORY  Family History   Problem Relation Age of Onset    Aneurysm Mother         cerebral    Lung cancer Father     Coronary artery disease Brother     Aortic aneurysm Brother     Malig Hyperthermia Neg Hx          SOCIAL HISTORY  Social History     Socioeconomic History    Marital status:    Tobacco Use    Smoking status: Former     Current packs/day: 0.00     Types: Cigarettes     Quit date:      Years since quittin.2    Smokeless tobacco: Never   Vaping Use    Vaping status: Never Used   Substance and Sexual Activity    Alcohol use: Yes     Alcohol/week: 7.0 standard drinks of alcohol     Types: 7 Glasses of wine per week    Drug use: No    Sexual activity: Defer         ALLERGIES  Patient has no known allergies.      REVIEW OF SYSTEMS  Included in HPI  All systems reviewed and negative except for those discussed in HPI.      PHYSICAL EXAM    I have reviewed the triage vital signs and nursing notes.    ED Triage Vitals   Temp Heart Rate Resp BP SpO2   25 1642 25 1642 25 1642 25 1646 25 1642   98.3 °F (36.8 °C) 85 18 153/88 94 %      Temp src Heart Rate Source Patient Position BP Location FiO2 (%)   25 1642 -- -- 25 1646 --   Tympanic   Right arm        Physical Exam  Constitutional:        General: She is not in acute distress.     Appearance: She is well-developed.   HENT:      Head: Normocephalic and atraumatic.   Eyes:      General: No scleral icterus.     Conjunctiva/sclera: Conjunctivae normal.   Neck:      Trachea: No tracheal deviation.   Cardiovascular:      Rate and Rhythm: Normal rate and regular rhythm.   Pulmonary:      Effort: Pulmonary effort is normal.      Breath sounds: Normal breath sounds.   Abdominal:      Palpations: Abdomen is soft.      Tenderness: There is no abdominal tenderness.   Musculoskeletal:         General: No deformity.        Arms:       Cervical back: Normal range of motion.   Lymphadenopathy:      Cervical: No cervical adenopathy.   Skin:     General: Skin is warm and dry.   Neurological:      Mental Status: She is alert and oriented to person, place, and time.   Psychiatric:         Behavior: Behavior normal.         Vital signs and nursing notes reviewed.      PPE: I wore a surgical mask throughout my encounters with the pt. I performed hand hygiene on entry into the pt room and upon exit.     LAB RESULTS  No results found for this or any previous visit (from the past 24 hours).      RADIOLOGY  No Radiology Exams Resulted Within Past 24 Hours      MEDICATIONS GIVEN IN ER  Medications - No data to display      ORDERS PLACED DURING THIS VISIT:  Orders Placed This Encounter   Procedures    Laceration Repair         OUTPATIENT MEDICATION MANAGEMENT:  No current Epic-ordered facility-administered medications on file.     Current Outpatient Medications Ordered in Epic   Medication Sig Dispense Refill    atorvastatin (LIPITOR) 20 MG tablet TAKE ONE TABLET BY MOUTH DAILY 90 tablet 3    Cholecalciferol 25 MCG (1000 UT) tablet Take 1 tablet by mouth Daily.      dicyclomine (BENTYL) 20 MG tablet Take 1 tablet by mouth Every 8 (Eight) Hours As Needed (abdominal pain). 20 tablet 0    DULoxetine (CYMBALTA) 30 MG capsule TAKE 1 CAPSULE BY MOUTH DAILY 90 capsule 1    lisinopril  (PRINIVIL,ZESTRIL) 10 MG tablet TAKE 1 TABLET BY MOUTH DAILY 90 tablet 1    vitamin B-12 (VITAMIN B-12) 1000 MCG tablet Take 1 tablet by mouth Daily.           PROCEDURES  Laceration Repair    Date/Time: 3/4/2025 6:15 PM    Performed by: Rebel León PA  Authorized by: Willian Alexander MD    Consent:     Consent obtained:  Verbal    Consent given by:  Patient    Risks, benefits, and alternatives were discussed: yes      Risks discussed:  Infection    Alternatives discussed:  No treatment  Universal protocol:     Procedure explained and questions answered to patient or proxy's satisfaction: yes      Relevant documents present and verified: yes      Patient identity confirmed:  Verbally with patient  Anesthesia:     Anesthesia method:  None  Laceration details:     Location:  Shoulder/arm    Shoulder/arm location:  L lower arm    Length (cm):  1  Exploration:     Limited defect created (wound extended): yes      Hemostasis achieved with:  Direct pressure  Treatment:     Area cleansed with:  Chlorhexidine    Amount of cleaning:  Extensive    Irrigation solution:  Sterile saline    Irrigation method:  Tap  Skin repair:     Repair method:  Tissue adhesive  Approximation:     Approximation:  Close  Repair type:     Repair type:  Complex  Post-procedure details:     Dressing:  Open (no dressing)    Procedure completion:  Tolerated well, no immediate complications          PROGRESS, DATA ANALYSIS, CONSULTS, AND MEDICAL DECISION MAKING  All labs have been independently interpreted by me.  All radiology studies have been reviewed by me. All EKG's have been independently viewed and interpreted by me.  Discussion below represents my analysis of pertinent findings related to patient's condition, differential diagnosis, treatment plan and final disposition.    Patient presentation and evaluation most consistent with uncomplicated minor laceration to the volar aspect of the left forearm.  No concern for foreign body.   Closed with tissue adhesive.  PCP follow-up recommended and close return precautions given.  No indication for further workup or admission.    DIFFERENTIAL DIAGNOSIS INCLUDE BUT NOT LIMITED TO:     Skin tear, laceration, wound infection    Clinical Scores: N/A             I rechecked the patient.  I discussed the patient's diagnosis, and plan for discharge.  A repeat exam reveals no new worrisome changes from my initial exam findings.  The patient understands that the fact that they are being discharged does not denote that nothing is abnormal, it indicates that no clinical emergency is present and that they must follow-up as directed in order to properly maintain their health.  Follow-up instructions (specifically listed below) and return to ER precautions were given at this time.  I specifically instructed the patient to follow-up with their PCP.  The patient understands and agrees with the plan, and is ready for discharge.  All questions answered.         AS OF 19:25 EST VITALS:    BP - 153/88  HR - 85  TEMP - 98.3 °F (36.8 °C) (Tympanic)  O2 SATS - 94%    COMPLEXITY OF CARE  Admission was considered but after careful review of the patient's presentation, physical examination, diagnostic results, and response to treatment the patient may be safely discharged with outpatient follow-up.      DIAGNOSIS  Final diagnoses:   Laceration of left wrist, initial encounter         DISPOSITION  ED Disposition       ED Disposition   Discharge    Condition   Stable    Comment   --                Please note that portions of this document were completed with a voice recognition program.    Note Disclaimer: At [facility], we believe that sharing information builds trust and better relationships. You are receiving this note because you recently visited [facility]. It is possible you will see health information before a provider has talked with you about it. This kind of information can be easy to misunderstand. To help  you fully understand what it means for your health, we urge you to discuss this note with your provider.         Rebel León PA  03/05/25 1926

## 2025-03-04 NOTE — ED NOTES
Patient to er from work with c/o she cut her wrist with . Bleeding controled in triage. Dressing applied.

## 2025-03-12 ENCOUNTER — TELEPHONE (OUTPATIENT)
Dept: INTERNAL MEDICINE | Facility: CLINIC | Age: 75
End: 2025-03-12
Payer: MEDICARE

## 2025-03-12 NOTE — TELEPHONE ENCOUNTER
She is wanting a medication that is not a capsule. She states her body does not break down capsules well. She has been taking a probiotic lately and now she constipated. She is thinking its because she has undigested capsules in her GI track.     Patient was advised Dr Malone is out of the office today.

## 2025-03-12 NOTE — TELEPHONE ENCOUNTER
Call and advise.  Duloxetine does not come in tablet.  Does she want to go back to Lexapro?  PEGGY

## 2025-03-12 NOTE — TELEPHONE ENCOUNTER
Caller: Gina Gonzalez    Relationship: Self    Best call back number: [phone]    What is the best time to reach you: ANYTIME    Who are you requesting to speak with (clinical staff, provider,  specific staff member): ELAINE    Do you know the name of the person who called: PATIENT     What was the call regarding: CHANGING THE     DULoxetine (CYMBALTA) 30 MG capsule   TO A TABLET    Is it okay if the provider responds through MyChart: NO

## 2025-03-13 RX ORDER — ESCITALOPRAM OXALATE 10 MG/1
10 TABLET ORAL DAILY
Qty: 30 TABLET | Refills: 5 | Status: SHIPPED | OUTPATIENT
Start: 2025-03-13

## 2025-03-27 RX ORDER — ATORVASTATIN CALCIUM 20 MG/1
20 TABLET, FILM COATED ORAL DAILY
Qty: 90 TABLET | Refills: 3 | Status: SHIPPED | OUTPATIENT
Start: 2025-03-27

## 2025-05-08 RX ORDER — DULOXETIN HYDROCHLORIDE 30 MG/1
30 CAPSULE, DELAYED RELEASE ORAL DAILY
Qty: 90 CAPSULE | Refills: 1 | OUTPATIENT
Start: 2025-05-08

## 2025-05-23 ENCOUNTER — TRANSCRIBE ORDERS (OUTPATIENT)
Dept: ADMINISTRATIVE | Facility: HOSPITAL | Age: 75
End: 2025-05-23
Payer: MEDICARE

## 2025-05-23 DIAGNOSIS — Z12.31 SCREENING MAMMOGRAM FOR BREAST CANCER: Primary | ICD-10-CM

## 2025-07-18 ENCOUNTER — HOSPITAL ENCOUNTER (OUTPATIENT)
Dept: MAMMOGRAPHY | Facility: HOSPITAL | Age: 75
Discharge: HOME | End: 2025-07-18
Admitting: INTERNAL MEDICINE
Payer: MEDICARE

## 2025-07-18 DIAGNOSIS — Z12.31 SCREENING MAMMOGRAM FOR BREAST CANCER: ICD-10-CM

## 2025-07-18 PROCEDURE — 77063 BREAST TOMOSYNTHESIS BI: CPT

## 2025-07-18 PROCEDURE — 77067 SCR MAMMO BI INCL CAD: CPT

## 2025-08-01 RX ORDER — LISINOPRIL 10 MG/1
10 TABLET ORAL DAILY
Qty: 90 TABLET | Refills: 1 | Status: SHIPPED | OUTPATIENT
Start: 2025-08-01

## 2025-08-11 ENCOUNTER — OFFICE VISIT (OUTPATIENT)
Dept: ORTHOPEDIC SURGERY | Facility: CLINIC | Age: 75
End: 2025-08-11
Payer: MEDICARE

## 2025-08-11 VITALS — TEMPERATURE: 97.8 F | HEIGHT: 66 IN | BODY MASS INDEX: 28.61 KG/M2 | WEIGHT: 178 LBS

## 2025-08-11 DIAGNOSIS — M70.61 TROCHANTERIC BURSITIS OF RIGHT HIP: Primary | ICD-10-CM

## 2025-08-11 DIAGNOSIS — Z96.641 HISTORY OF TOTAL HIP ARTHROPLASTY, RIGHT: ICD-10-CM

## 2025-08-11 DIAGNOSIS — M76.891 TENDINITIS OF RIGHT HIP: ICD-10-CM

## 2025-08-11 PROCEDURE — 99204 OFFICE O/P NEW MOD 45 MIN: CPT | Performed by: ORTHOPAEDIC SURGERY

## 2025-08-11 PROCEDURE — 1160F RVW MEDS BY RX/DR IN RCRD: CPT | Performed by: ORTHOPAEDIC SURGERY

## 2025-08-11 PROCEDURE — 1159F MED LIST DOCD IN RCRD: CPT | Performed by: ORTHOPAEDIC SURGERY

## 2025-08-22 DIAGNOSIS — I10 ESSENTIAL HYPERTENSION: ICD-10-CM

## 2025-08-22 DIAGNOSIS — E78.49 OTHER HYPERLIPIDEMIA: Primary | ICD-10-CM

## 2025-08-22 LAB
25(OH)D3+25(OH)D2 SERPL-MCNC: 39 NG/ML (ref 30–100)
ALBUMIN SERPL-MCNC: 4.2 G/DL (ref 3.5–5.2)
ALBUMIN/GLOB SERPL: 1.8 G/DL
ALP SERPL-CCNC: 78 U/L (ref 39–117)
ALT SERPL-CCNC: 15 U/L (ref 1–33)
APPEARANCE UR: CLEAR
AST SERPL-CCNC: 18 U/L (ref 1–32)
BACTERIA #/AREA URNS HPF: ABNORMAL /HPF
BASOPHILS # BLD AUTO: 0.03 10*3/MM3 (ref 0–0.2)
BASOPHILS NFR BLD AUTO: 0.6 % (ref 0–1.5)
BILIRUB SERPL-MCNC: 0.7 MG/DL (ref 0–1.2)
BILIRUB UR QL STRIP: NEGATIVE
BUN SERPL-MCNC: 20 MG/DL (ref 8–23)
BUN/CREAT SERPL: 24.1 (ref 7–25)
CALCIUM SERPL-MCNC: 9.4 MG/DL (ref 8.6–10.5)
CASTS URNS MICRO: ABNORMAL
CHLORIDE SERPL-SCNC: 103 MMOL/L (ref 98–107)
CHOLEST SERPL-MCNC: 181 MG/DL (ref 0–200)
CO2 SERPL-SCNC: 23.5 MMOL/L (ref 22–29)
COLOR UR: YELLOW
CREAT SERPL-MCNC: 0.83 MG/DL (ref 0.57–1)
EGFRCR SERPLBLD CKD-EPI 2021: 74.1 ML/MIN/1.73
EOSINOPHIL # BLD AUTO: 0.2 10*3/MM3 (ref 0–0.4)
EOSINOPHIL NFR BLD AUTO: 3.8 % (ref 0.3–6.2)
EPI CELLS #/AREA URNS HPF: ABNORMAL /HPF
ERYTHROCYTE [DISTWIDTH] IN BLOOD BY AUTOMATED COUNT: 12.8 % (ref 12.3–15.4)
GLOBULIN SER CALC-MCNC: 2.3 GM/DL
GLUCOSE SERPL-MCNC: 88 MG/DL (ref 65–99)
GLUCOSE UR QL STRIP: NEGATIVE
HCT VFR BLD AUTO: 43.4 % (ref 34–46.6)
HDLC SERPL-MCNC: 91 MG/DL (ref 40–60)
HGB BLD-MCNC: 14.3 G/DL (ref 12–15.9)
HGB UR QL STRIP: NEGATIVE
IMM GRANULOCYTES # BLD AUTO: 0.02 10*3/MM3 (ref 0–0.05)
IMM GRANULOCYTES NFR BLD AUTO: 0.4 % (ref 0–0.5)
KETONES UR QL STRIP: NEGATIVE
LDLC SERPL CALC-MCNC: 81 MG/DL (ref 0–100)
LEUKOCYTE ESTERASE UR QL STRIP: ABNORMAL
LYMPHOCYTES # BLD AUTO: 2.03 10*3/MM3 (ref 0.7–3.1)
LYMPHOCYTES NFR BLD AUTO: 38.7 % (ref 19.6–45.3)
MCH RBC QN AUTO: 30.6 PG (ref 26.6–33)
MCHC RBC AUTO-ENTMCNC: 32.9 G/DL (ref 31.5–35.7)
MCV RBC AUTO: 92.9 FL (ref 79–97)
MONOCYTES # BLD AUTO: 0.59 10*3/MM3 (ref 0.1–0.9)
MONOCYTES NFR BLD AUTO: 11.2 % (ref 5–12)
NEUTROPHILS # BLD AUTO: 2.38 10*3/MM3 (ref 1.7–7)
NEUTROPHILS NFR BLD AUTO: 45.3 % (ref 42.7–76)
NITRITE UR QL STRIP: NEGATIVE
NRBC BLD AUTO-RTO: 0 /100 WBC (ref 0–0.2)
PH UR STRIP: 7 [PH] (ref 5–8)
PLATELET # BLD AUTO: 263 10*3/MM3 (ref 140–450)
POTASSIUM SERPL-SCNC: 4.8 MMOL/L (ref 3.5–5.2)
PROT SERPL-MCNC: 6.5 G/DL (ref 6–8.5)
PROT UR QL STRIP: NEGATIVE
RBC # BLD AUTO: 4.67 10*6/MM3 (ref 3.77–5.28)
RBC #/AREA URNS HPF: ABNORMAL /HPF
SODIUM SERPL-SCNC: 139 MMOL/L (ref 136–145)
SP GR UR STRIP: 1.01 (ref 1–1.03)
T4 FREE SERPL-MCNC: 0.99 NG/DL (ref 0.93–1.7)
TRIGL SERPL-MCNC: 46 MG/DL (ref 0–150)
TSH SERPL DL<=0.005 MIU/L-ACNC: 4.27 UIU/ML (ref 0.27–4.2)
UROBILINOGEN UR STRIP-MCNC: ABNORMAL MG/DL
VLDLC SERPL CALC-MCNC: 9 MG/DL (ref 5–40)
WBC # BLD AUTO: 5.25 10*3/MM3 (ref 3.4–10.8)
WBC #/AREA URNS HPF: ABNORMAL /HPF

## 2025-08-28 ENCOUNTER — OFFICE VISIT (OUTPATIENT)
Dept: ORTHOPEDIC SURGERY | Facility: CLINIC | Age: 75
End: 2025-08-28
Payer: MEDICARE

## 2025-08-28 VITALS — WEIGHT: 180 LBS | HEIGHT: 66 IN | TEMPERATURE: 98.6 F | BODY MASS INDEX: 28.93 KG/M2

## 2025-08-28 DIAGNOSIS — M76.891 TENDINITIS OF RIGHT HIP: ICD-10-CM

## 2025-08-28 DIAGNOSIS — M70.61 TROCHANTERIC BURSITIS OF RIGHT HIP: Primary | ICD-10-CM

## 2025-08-28 RX ADMIN — METHYLPREDNISOLONE ACETATE 80 MG: 80 INJECTION, SUSPENSION INTRA-ARTICULAR; INTRALESIONAL; INTRAMUSCULAR; SOFT TISSUE at 16:49

## 2025-08-28 RX ADMIN — LIDOCAINE HYDROCHLORIDE 2 ML: 10 INJECTION, SOLUTION EPIDURAL; INFILTRATION; INTRACAUDAL; PERINEURAL at 16:49

## 2025-08-29 ENCOUNTER — OFFICE VISIT (OUTPATIENT)
Dept: INTERNAL MEDICINE | Facility: CLINIC | Age: 75
End: 2025-08-29
Payer: MEDICARE

## 2025-08-29 VITALS
WEIGHT: 176 LBS | OXYGEN SATURATION: 97 % | HEART RATE: 67 BPM | DIASTOLIC BLOOD PRESSURE: 76 MMHG | SYSTOLIC BLOOD PRESSURE: 120 MMHG | BODY MASS INDEX: 28.28 KG/M2 | HEIGHT: 66 IN

## 2025-08-29 DIAGNOSIS — Z00.00 WELL ADULT EXAM: ICD-10-CM

## 2025-08-29 DIAGNOSIS — Z00.00 MEDICARE ANNUAL WELLNESS VISIT, SUBSEQUENT: Primary | ICD-10-CM

## 2025-08-29 RX ORDER — LIDOCAINE HYDROCHLORIDE 10 MG/ML
2 INJECTION, SOLUTION EPIDURAL; INFILTRATION; INTRACAUDAL; PERINEURAL
Status: COMPLETED | OUTPATIENT
Start: 2025-08-28 | End: 2025-08-28

## 2025-08-29 RX ORDER — METHYLPREDNISOLONE ACETATE 80 MG/ML
80 INJECTION, SUSPENSION INTRA-ARTICULAR; INTRALESIONAL; INTRAMUSCULAR; SOFT TISSUE
Status: COMPLETED | OUTPATIENT
Start: 2025-08-28 | End: 2025-08-28

## (undated) DEVICE — TUBING, SUCTION, 1/4" X 10', STRAIGHT: Brand: MEDLINE

## (undated) DEVICE — SKIN PREP TRAY W/CHG: Brand: MEDLINE INDUSTRIES, INC.

## (undated) DEVICE — PK SHLDR OPN 40

## (undated) DEVICE — ZIP 16 SURGICAL SKIN CLOSURE DEVICE, PSA: Brand: ZIP 16 SURGICAL SKIN CLOSURE DEVICE

## (undated) DEVICE — SHEET, DRAPE, SPLIT, STERILE: Brand: MEDLINE

## (undated) DEVICE — PATIENT RETURN ELECTRODE, SINGLE-USE, CONTACT QUALITY MONITORING, ADULT, WITH 9FT CORD, FOR PATIENTS WEIGING OVER 33LBS. (15KG): Brand: MEGADYNE

## (undated) DEVICE — DRESSING,GAUZE,XEROFORM,CURAD,1"X8",ST: Brand: CURAD

## (undated) DEVICE — KT BIT DRL EXATECHGPS REV 2MM 3.2MM DISP

## (undated) DEVICE — GLND KWIRE
Type: IMPLANTABLE DEVICE | Site: SHOULDER | Status: NON-FUNCTIONAL
Brand: EQUINOXE
Removed: 2024-02-21

## (undated) DEVICE — THE TORRENT IRRIGATION SCOPE CONNECTOR IS USED WITH THE TORRENT IRRIGATION TUBING TO PROVIDE IRRIGATION FLUIDS SUCH AS STERILE WATER DURING GASTROINTESTINAL ENDOSCOPIC PROCEDURES WHEN USED IN CONJUNCTION WITH AN IRRIGATION PUMP (OR ELECTROSURGICAL UNIT).: Brand: TORRENT

## (undated) DEVICE — KT SHLDR EXACTECHGPS DISP

## (undated) DEVICE — ARM SLING: Brand: DEROYAL

## (undated) DEVICE — Device: Brand: DEFENDO AIR/WATER/SUCTION AND BIOPSY VALVE

## (undated) DEVICE — HANDPIECE SET WITH COAXIAL HIGH FLOW TIP AND SUCTION TUBE: Brand: INTERPULSE

## (undated) DEVICE — GLV SURG BIOGEL LTX PF 6 1/2

## (undated) DEVICE — SUT VIC 0 CT1 36IN J946H

## (undated) DEVICE — DRAPE,U/ SHT,SPLIT,PLAS,STERIL: Brand: MEDLINE

## (undated) DEVICE — MAT FLR ABSORBENT LG 4FT 10 2.5FT

## (undated) DEVICE — PREP SOL POVIDONE/IODINE BT 4OZ

## (undated) DEVICE — SUT ETHIB 2 CV V37 MS/4 30IN MX69G

## (undated) DEVICE — CANN NASL CO2 TRULINK W/O2 A/

## (undated) DEVICE — DUAL CUT SAGITTAL BLADE

## (undated) DEVICE — KWIRE SMOTH DBL/TROC .062X4IN
Type: IMPLANTABLE DEVICE | Site: SHOULDER | Status: NON-FUNCTIONAL
Removed: 2024-02-21

## (undated) DEVICE — GLV SURG BIOGEL LTX PF 8 1/2

## (undated) DEVICE — POOLE SUCTION HANDLE: Brand: CARDINAL HEALTH

## (undated) DEVICE — KT PIN HEX SHLDR CORACOID EXACTECHGPS DISP

## (undated) DEVICE — SUT VIC 2/0 X1 27IN J459H

## (undated) DEVICE — BLANKT WARM LOWR/BDY 100X120CM

## (undated) DEVICE — GLV SURG BIOGEL LTX PF 8